# Patient Record
Sex: FEMALE | Race: WHITE | Employment: FULL TIME | URBAN - METROPOLITAN AREA
[De-identification: names, ages, dates, MRNs, and addresses within clinical notes are randomized per-mention and may not be internally consistent; named-entity substitution may affect disease eponyms.]

---

## 2017-01-10 ENCOUNTER — TELEPHONE (OUTPATIENT)
Dept: INTERNAL MEDICINE | Facility: CLINIC | Age: 37
End: 2017-01-10

## 2017-01-10 NOTE — TELEPHONE ENCOUNTER
1/10/2017    Call Regarding Preventive Health Screening Cervical/PAP    Attempt 1    Message on voicemail     Comments:       Outreach   KV

## 2017-01-20 NOTE — TELEPHONE ENCOUNTER
Call Regarding Preventive Health Screening Cervical/PAP and Physical    Attempt 2    Message on voicemail     Comments:       Outreach   Esthela Delgado

## 2017-01-25 NOTE — TELEPHONE ENCOUNTER
1/25/2017    Call Regarding Preventive Health Screening Cervical/PAP    Attempt 3    Message on voicemail     Comments:         Outreach   Keerthi oCe

## 2017-03-06 ENCOUNTER — TELEPHONE (OUTPATIENT)
Dept: OTHER | Facility: CLINIC | Age: 37
End: 2017-03-06

## 2017-03-06 NOTE — TELEPHONE ENCOUNTER
3/6/2017    Call Regarding Onboarding [employer]    Attempt 1    Message on voicemail     Comments: NO DEP      Outreach   CC

## 2017-03-13 NOTE — TELEPHONE ENCOUNTER
3/13/2017    Call Regarding Onboarding [employer]    Attempt 2    Message on voicemail     Comments: no dep        Outreach   Keerthi Coe

## 2017-03-21 NOTE — TELEPHONE ENCOUNTER
3/21/2017    Call Regarding Onboarding [employer]    Attempt 3    Message on voicemail     Comments: no dep        Outreach   Keerthi Coe

## 2017-06-24 ENCOUNTER — HEALTH MAINTENANCE LETTER (OUTPATIENT)
Age: 37
End: 2017-06-24

## 2017-12-29 ENCOUNTER — OFFICE VISIT (OUTPATIENT)
Dept: INTERNAL MEDICINE | Facility: CLINIC | Age: 37
End: 2017-12-29
Payer: COMMERCIAL

## 2017-12-29 VITALS
BODY MASS INDEX: 44.08 KG/M2 | SYSTOLIC BLOOD PRESSURE: 124 MMHG | WEIGHT: 264.6 LBS | TEMPERATURE: 97.6 F | HEIGHT: 65 IN | DIASTOLIC BLOOD PRESSURE: 98 MMHG | OXYGEN SATURATION: 95 % | HEART RATE: 99 BPM

## 2017-12-29 DIAGNOSIS — J30.89 CHRONIC NON-SEASONAL ALLERGIC RHINITIS, UNSPECIFIED TRIGGER: ICD-10-CM

## 2017-12-29 PROCEDURE — 99214 OFFICE O/P EST MOD 30 MIN: CPT | Performed by: PHYSICIAN ASSISTANT

## 2017-12-29 RX ORDER — FLUTICASONE PROPIONATE 50 MCG
1-2 SPRAY, SUSPENSION (ML) NASAL DAILY
Qty: 1 BOTTLE | Refills: 11 | Status: SHIPPED | OUTPATIENT
Start: 2017-12-29 | End: 2018-03-29

## 2017-12-29 RX ORDER — LORATADINE 10 MG/1
10 TABLET ORAL DAILY
Qty: 30 TABLET | Refills: 11 | Status: SHIPPED | OUTPATIENT
Start: 2017-12-29 | End: 2018-03-29

## 2017-12-29 RX ORDER — MONTELUKAST SODIUM 10 MG/1
10 TABLET ORAL AT BEDTIME
Qty: 30 TABLET | Refills: 11 | Status: SHIPPED | OUTPATIENT
Start: 2017-12-29 | End: 2018-03-29

## 2017-12-29 NOTE — NURSING NOTE
"Chief Complaint   Patient presents with     Cough     Headache     Nasal Congestion       Initial BP (!) 124/98  Pulse 99  Temp 97.6  F (36.4  C) (Oral)  Ht 5' 5\" (1.651 m)  Wt 264 lb 9.6 oz (120 kg)  LMP 12/12/2017  SpO2 95%  BMI 44.03 kg/m2 Estimated body mass index is 44.03 kg/(m^2) as calculated from the following:    Height as of this encounter: 5' 5\" (1.651 m).    Weight as of this encounter: 264 lb 9.6 oz (120 kg).  Medication Reconciliation: complete    "

## 2017-12-29 NOTE — PATIENT INSTRUCTIONS
Start back on Claritin and Singulair     Add in Flonase 2 sprays in each nostril     Follow up if no improvement

## 2017-12-29 NOTE — PROGRESS NOTES
"  SUBJECTIVE:   Steffi Hernandez is a 37 year old female who presents to clinic today for the following health issues:      RESPIRATORY SYMPTOMS      Duration: 1 month    Description  nasal congestion, sore throat, cough, headache and fatigue/malaise    Severity: severe    Accompanying signs and symptoms: None    History (predisposing factors):  none    Precipitating or alleviating factors: None    Therapies tried and outcome:  rest and fluids oral decongestant    Pt notes symptoms have been present for 1 month. She notes that got better than worsened yesterday.  She notes today she woke up feeling the best she has all month.  Pt reports congestions with 1 day of HA and sinus pain.  Her cough is productive.  She has had no fevers.  Got better than worsened yesterday   Pt has stopped her Singulair and Claritin  She has a history of chronic allergies.  She had nasal surgery last year without relief.     Problem list and histories reviewed & adjusted, as indicated.  Additional history: as documented    Reviewed and updated as needed this visit by clinical staffTobacco  Allergies  Meds  Problems  Med Hx  Surg Hx  Fam Hx  Soc Hx        Reviewed and updated as needed this visit by Provider  Meds  Problems           OBJECTIVE:     BP (!) 124/98  Pulse 99  Temp 97.6  F (36.4  C) (Oral)  Ht 5' 5\" (1.651 m)  Wt 264 lb 9.6 oz (120 kg)  LMP 12/12/2017  SpO2 95%  BMI 44.03 kg/m2  Body mass index is 44.03 kg/(m^2).  GENERAL: healthy, alert and no distress  EYES: Eyes grossly normal to inspection, PERRL and conjunctivae and sclerae normal  HENT: normal cephalic/atraumatic, ear canals and TM's normal, nasal mucosa edematous , oropharynx clear and oral mucous membranes moist  NECK: no adenopathy, no asymmetry, masses, or scars and thyroid normal to palpation  RESP: lungs clear to auscultation - no rales, rhonchi or wheezes  CV: regular rates and rhythm, normal S1 S2, no S3 or S4 and no murmur, click or " rub    ASSESSMENT/PLAN:     1. Chronic non-seasonal allergic rhinitis, unspecified trigger  - I suspect pt's symptoms are primarily allergic in nature with possible viral etiology as well, no signs or symptoms to suggest bacterial cause at this time  - restart allergy regimen   - loratadine (CLARITIN) 10 MG tablet; Take 1 tablet (10 mg) by mouth daily INDICATION: TO CONTROL ALLERGY SYMPTOMS  Dispense: 30 tablet; Refill: 11  - montelukast (SINGULAIR) 10 MG tablet; Take 1 tablet (10 mg) by mouth At Bedtime INDICATION: TO TREAT ALLERGIC RHINITIS  Dispense: 30 tablet; Refill: 11  - fluticasone (FLONASE) 50 MCG/ACT spray; Spray 1-2 sprays into both nostrils daily  Dispense: 1 Bottle; Refill: 11  - follow up if symptoms worsen or fail to improve, may need to consider referral back to allergist if no relief  - pt agreed to the above plan and all questions are answered     Angela Hope PA-C  [facility]

## 2017-12-29 NOTE — MR AVS SNAPSHOT
After Visit Summary   12/29/2017    Steffi Hernandez    MRN: [id number]           Patient Information     Date Of Birth          1980        Visit Information        Provider Department      12/29/2017 11:40 AM Angela Hope PA-C [facility]        Today's Diagnoses     Chronic non-seasonal allergic rhinitis, unspecified trigger          Care Instructions    Start back on Claritin and Singulair     Add in Flonase 2 sprays in each nostril     Follow up if no improvement           Follow-ups after your visit        Who to contact     If you have questions or need follow up information about today's clinic visit or your schedule please contact [facility] directly at [phone].  Normal or non-critical lab and imaging results will be communicated to you by NativeXhart, letter or phone within 4 business days after the clinic has received the results. If you do not hear from us within 7 days, please contact the clinic through NativeXhart or phone. If you have a critical or abnormal lab result, we will notify you by phone as soon as possible.  Submit refill requests through [email or web address] or call your pharmacy and they will forward the refill request to us. Please allow 3 business days for your refill to be completed.          Additional Information About Your Visit        MyChart Information     [email or web address] gives you secure access to your electronic health record. If you see a primary care provider, you can also send messages to your care team and make appointments. If you have questions, please call your primary care clinic.  If you do not have a primary care provider, please call [phone] and they will assist you.        Care EveryWhere ID     This is your Care EveryWhere ID. This could be used by other organizations to access your [place] medical records  [id number]        Your Vitals Were     Pulse Temperature Height Last Period Pulse Oximetry BMI (Body  "Mass Index)    99 97.6  F (36.4  C) (Oral) 5' 5\" (1.651 m) 12/12/2017 95% 44.03 kg/m2       Blood Pressure from Last 3 Encounters:   12/29/17 (!) 124/98   11/15/16 110/70   05/19/16 129/77    Weight from Last 3 Encounters:   12/29/17 264 lb 9.6 oz (120 kg)   11/15/16 273 lb 8 oz (124.1 kg)   05/18/16 264 lb (119.7 kg)              Today, you had the following     No orders found for display         Today's Medication Changes          These changes are accurate as of: 12/29/17 11:46 AM.  If you have any questions, ask your nurse or doctor.               Start taking these medicines.        Dose/Directions    fluticasone 50 MCG/ACT spray   Commonly known as:  FLONASE   Used for:  Chronic non-seasonal allergic rhinitis, unspecified trigger   Started by:  Angela Hope PA-C        Dose:  1-2 spray   Spray 1-2 sprays into both nostrils daily   Quantity:  1 Bottle   Refills:  11            Where to get your medicines      These medications were sent to [facility] [address] [place] [address] [address] AT [facility]  [address], [facility] [address]    Hours:  24-hours Phone:  [phone]     fluticasone 50 MCG/ACT spray    loratadine 10 MG tablet    montelukast 10 MG tablet                Primary Care Provider Office Phone # Fax #    Farhad Marr [phone]       XXX RESIGNED XXX  [facility] [address]        Equal Access to Services     RAGHU JONES AH: Hadavelino Magaña, wajerry ayala, qaybta kaalirene brewer. So [facility] [phone].    ATENCIÓN: Si habla español, tiene a sy disposición servicios gratuitos de asistencia lingüística. Tor al [phone].    We comply with applicable federal civil rights laws and [place] laws. We do not discriminate on the basis of race, color, national origin, age, disability, sex, sexual orientation, or gender identity.            Thank you!     Thank you for " choosing [facility]  for your care. Our goal is always to provide you with excellent care. Hearing back from our patients is one way we can continue to improve our services. Please take a few minutes to complete the written survey that you may receive in the mail after your visit with us. Thank you!             Your Updated Medication List - Protect others around you: Learn how to safely use, store and throw away your medicines at www.disposemymeds.org.          This list is accurate as of: 12/29/17 11:46 AM.  Always use your most recent med list.                   Brand Name Dispense Instructions for use Diagnosis    fluticasone 50 MCG/ACT spray    FLONASE    1 Bottle    Spray 1-2 sprays into both nostrils daily    Chronic non-seasonal allergic rhinitis, unspecified trigger       loratadine 10 MG tablet    CLARITIN    30 tablet    Take 1 tablet (10 mg) by mouth daily INDICATION: TO CONTROL ALLERGY SYMPTOMS    Chronic non-seasonal allergic rhinitis, unspecified trigger       montelukast 10 MG tablet    SINGULAIR    30 tablet    Take 1 tablet (10 mg) by mouth At Bedtime INDICATION: TO TREAT ALLERGIC RHINITIS    Chronic non-seasonal allergic rhinitis, unspecified trigger

## 2018-03-26 ASSESSMENT — ENCOUNTER SYMPTOMS
COUGH: 1
SPEECH CHANGE: 0
WHEEZING: 0
SMELL DISTURBANCE: 0
NUMBNESS: 1
MYALGIAS: 1
DIFFICULTY URINATING: 0
TINGLING: 1
SORE THROAT: 0
TASTE DISTURBANCE: 0
DIZZINESS: 0
MUSCLE WEAKNESS: 0
HEMATURIA: 0
SHORTNESS OF BREATH: 0
SEIZURES: 0
HEMOPTYSIS: 0
BRUISES/BLEEDS EASILY: 1
SWOLLEN GLANDS: 0
DISTURBANCES IN COORDINATION: 0
NECK MASS: 0
DYSURIA: 0
MUSCLE CRAMPS: 0
SINUS CONGESTION: 1
SNORES LOUDLY: 1
TROUBLE SWALLOWING: 0
POSTURAL DYSPNEA: 0
SPUTUM PRODUCTION: 1
PARALYSIS: 0
FLANK PAIN: 0
MEMORY LOSS: 0
COUGH DISTURBING SLEEP: 0
LOSS OF CONSCIOUSNESS: 0
JOINT SWELLING: 0
HEADACHES: 0
TREMORS: 0
HOARSE VOICE: 1
WEAKNESS: 0
STIFFNESS: 0
ARTHRALGIAS: 0
BACK PAIN: 1
NECK PAIN: 1
DYSPNEA ON EXERTION: 1
SINUS PAIN: 1

## 2018-03-29 ENCOUNTER — OFFICE VISIT (OUTPATIENT)
Dept: INTERNAL MEDICINE | Facility: CLINIC | Age: 38
End: 2018-03-29
Payer: COMMERCIAL

## 2018-03-29 VITALS
DIASTOLIC BLOOD PRESSURE: 92 MMHG | BODY MASS INDEX: 44.55 KG/M2 | RESPIRATION RATE: 16 BRPM | OXYGEN SATURATION: 96 % | WEIGHT: 267.7 LBS | SYSTOLIC BLOOD PRESSURE: 136 MMHG | HEART RATE: 86 BPM

## 2018-03-29 DIAGNOSIS — Z00.00 ENCOUNTER FOR ROUTINE ADULT HEALTH EXAMINATION WITHOUT ABNORMAL FINDINGS: ICD-10-CM

## 2018-03-29 DIAGNOSIS — J30.89 CHRONIC NON-SEASONAL ALLERGIC RHINITIS, UNSPECIFIED TRIGGER: ICD-10-CM

## 2018-03-29 DIAGNOSIS — R74.01 TRANSAMINITIS: Primary | ICD-10-CM

## 2018-03-29 RX ORDER — FLUTICASONE PROPIONATE 50 MCG
2 SPRAY, SUSPENSION (ML) NASAL DAILY
Qty: 1 BOTTLE | Refills: 3 | Status: SHIPPED | OUTPATIENT
Start: 2018-03-29 | End: 2019-07-09

## 2018-03-29 RX ORDER — MONTELUKAST SODIUM 10 MG/1
10 TABLET ORAL AT BEDTIME
Qty: 30 TABLET | Refills: 11 | Status: SHIPPED | OUTPATIENT
Start: 2018-03-29 | End: 2019-09-06

## 2018-03-29 RX ORDER — ECHINACEA PURPUREA EXTRACT 125 MG
2 TABLET ORAL 2 TIMES DAILY
Qty: 1 BOTTLE | Refills: 3 | Status: SHIPPED | OUTPATIENT
Start: 2018-03-29 | End: 2019-07-09

## 2018-03-29 ASSESSMENT — ENCOUNTER SYMPTOMS
DECREASED APPETITE: 0
NIGHT SWEATS: 0
TROUBLE SWALLOWING: 0
HEMOPTYSIS: 0
POSTURAL DYSPNEA: 0
POLYDIPSIA: 0
JAUNDICE: 0
DECREASED LIBIDO: 0
DISTURBANCES IN COORDINATION: 0
BREAST MASS: 0
DIARRHEA: 0
NECK MASS: 0
MYALGIAS: 1
MUSCLE CRAMPS: 0
WEAKNESS: 0
CONSTIPATION: 0
FATIGUE: 0
EXERCISE INTOLERANCE: 0
HOARSE VOICE: 1
DEPRESSION: 0
LEG PAIN: 0
HEARTBURN: 0
EYE REDNESS: 0
NERVOUS/ANXIOUS: 0
BOWEL INCONTINENCE: 0
PALPITATIONS: 0
BLOATING: 0
BACK PAIN: 1
DOUBLE VISION: 0
TINGLING: 1
DYSPNEA ON EXERTION: 1
DYSURIA: 0
CLAUDICATION: 0
WHEEZING: 0
SEIZURES: 0
JOINT SWELLING: 0
VOMITING: 0
NECK PAIN: 1
HEADACHES: 0
LEG SWELLING: 0
MUSCLE WEAKNESS: 0
FLANK PAIN: 0
HEMATURIA: 0
FEVER: 0
SORE THROAT: 0
INSOMNIA: 0
WEIGHT LOSS: 0
MEMORY LOSS: 0
POLYPHAGIA: 0
DIZZINESS: 0
BLOOD IN STOOL: 0
STIFFNESS: 0
EYE IRRITATION: 0
LOSS OF CONSCIOUSNESS: 0
HYPERTENSION: 0
SINUS PAIN: 1
SYNCOPE: 0
SMELL DISTURBANCE: 0
BREAST PAIN: 0
WEIGHT GAIN: 0
POOR WOUND HEALING: 0
NUMBNESS: 1
DIFFICULTY URINATING: 0
ABDOMINAL PAIN: 0
HYPOTENSION: 0
CHILLS: 0
EYE PAIN: 0
TASTE DISTURBANCE: 0
DECREASED CONCENTRATION: 0
PARALYSIS: 0
RECTAL PAIN: 0
COUGH: 1
SINUS CONGESTION: 1
PANIC: 0
SLEEP DISTURBANCES DUE TO BREATHING: 0
TACHYCARDIA: 0
RECTAL BLEEDING: 0
LIGHT-HEADEDNESS: 0
NAUSEA: 0
SHORTNESS OF BREATH: 0
SKIN CHANGES: 0
SWOLLEN GLANDS: 0
NAIL CHANGES: 0
INCREASED ENERGY: 0
SPUTUM PRODUCTION: 1
SNORES LOUDLY: 1
COUGH DISTURBING SLEEP: 0
HOT FLASHES: 0
TREMORS: 0
ORTHOPNEA: 0
HALLUCINATIONS: 0
EYE WATERING: 0
ARTHRALGIAS: 0
SPEECH CHANGE: 0
ALTERED TEMPERATURE REGULATION: 0
BRUISES/BLEEDS EASILY: 1

## 2018-03-29 ASSESSMENT — PAIN SCALES - GENERAL: PAINLEVEL: NO PAIN (0)

## 2018-03-29 NOTE — MR AVS SNAPSHOT
After Visit Summary   3/29/2018    Steffi Hernandez    MRN: [id number]           Patient Information     Date Of Birth          1980        Visit Information        Provider Department      3/29/2018 9:10 AM Gee Cadet MD [facility]        Today's Diagnoses     Transaminitis    -  1    Chronic non-seasonal allergic rhinitis, unspecified trigger        Class 3 obesity with body mass index (BMI) of 40.0 to 44.9 in adult, unspecified obesity type, unspecified whether serious comorbidity present (H)        Encounter for routine adult health examination without abnormal findings          Care Instructions    Primary Care Center Phone Number [phone]  Primary Care Center Medication Refill Request Information:  * Please contact your pharmacy regarding ANY request for medication refills.  ** [facility] Prescription Fax = [phone]  * Please allow 3 business days for routine medication refills.  * Please allow 5 business days for controlled substance medication refills.     Primary Care Center Test Result notification information:  *You will be notified with in 7-10 days of your appointment day regarding the results of your test.  If you are on MyChart you will be notified as soon as the provider has reviewed the results and signed off on them.      - Please resume using flonase (point head down with tip of the bottle towards the ceiling, sniff with spray, wait a few minutes then repeat), singulair, continue daily zrytec/allegra.  - When eating out, try to make healthy choices from the menu, increase vegetable intake  - Please return for PAP  -  a blood pressure cuff and record your blood pressure    Dietary Approaches to Stop Hypertension (The DASH Diet)   What is hypertension?   Hypertension is the term for blood pressure that is consistently higher than normal. Blood pressure is the force of blood against artery walls. Blood pressure can be unhealthy if it is above 120/80.  The higher your blood pressure, the greater the health risk.     High blood pressure can be controlled if you take these steps:   Maintain a healthy weight.   Be physically active.   Follow a healthy eating plan, which includes foods lower in salt and sodium.   If you drink alcoholic beverages, do so in moderation.   As noted in this list, diet affects high blood pressure. Following the DASH diet and reducing the amount of sodium in your diet will help lower your blood pressure. It will also help prevent high blood pressure.     What is the DASH diet?   Dietary Approaches to Stop Hypertension (DASH) is a diet that is low in saturated fat, cholesterol, and total fat. It emphasizes fruits, vegetables, and low-fat dairy foods. The DASH diet also includes whole-grain products, fish, poultry, and nuts. It encourages fewer servings of red meat, sweets, and sugar-containing beverages. It is rich in magnesium, potassium, and calcium, as well as protein and fiber.     How do I get started on the DASH diet?   The DASH diet requires no special foods and has no hard-to-follow recipes. Start by seeing how DASH compares with your current eating habits.  The DASH eating plan shown is based on 2,000 calories a day. Your health care provider or a dietitian can help you determine how many calories a day you need. Most adults need somewhere between 1600 and 2800 calories a day. Serving sizes will vary between 1/2 cup and 1 1/4 cups.     Check the product's nutrition label to determine serving sizes of particular products.    Food Group   Number of Servings   Examples of Serving Size    Grains and grain products   7 to 8   1 slice of bread    1 cup ready-to-eat cold cereal    1/2 cup cooked rice, pasta, or   cereal    Vegetables   4 to 5   1 cup raw leafy vegetable    1/2 cup cooked vegetable    6 oz. vegetable juice    Fruits   4 to 5   1 medium fruit       1/4 cup dried fruit    1/2 cup fresh, frozen, or canned fruit    6 oz fruit  juice    Low-fat or fat-free dairy foods   2 to 3   8 oz milk    1 cup yogurt    1 1/2 oz cheese    Lean meats, poultry, or fish   2 or fewer   3 oz cooked lean meat, skinless poultry, or fish    Nuts, seeds, and dry beans   4 to 5 per week   1/3 cup or 1 1/2 oz nuts    1 tablespoon or 1/2 oz seeds    1/2 cup cooked dry beans     Fats and oils   2 to 3   1 teaspoon soft margarine    1 tablespoon low-fat mayonnaise    2 tablespoons light salad dressing    1 teaspoon vegetable oil   Sweets   5 per week   1 tablespoon sugar              8 oz lemonade    1 tablespoon jelly or jam     1/2 oz jelly beans     Make changes gradually. Here are some suggestions that might help:     If you now eat 1 or 2 servings of vegetables a day, add a serving at lunch and another at dinner.   If you don't eat fruit now or have only juice at breakfast, add a serving to your meals or have it as a snack.   Drink milk or water with lunch or dinner instead of soda, sugar-sweetened tea, or alcohol. Choose low-fat (1%) or fat-free (skim) dairy products to reduce how much saturated fat, total fat, cholesterol, and calories you eat. If you have trouble digesting dairy products, try taking lactase enzyme pills or drops (available at drugstores and groceries) with the dairy foods. Or buy lactose-free milk or milk with lactase enzyme added to it.   Read food labels on margarines and salad dressings to choose products lowest in fat.   If you now eat large portions of meat, cut back gradually--by a half or a third at each meal. Limit meat to 6 ounces a day (2 servings). Three to four ounces is about the size of a deck of cards.   Have 2 or more vegetarian-style (meatless) meals each week. Increase servings of vegetables, rice, pasta, and beans in all meals. Try casseroles and pasta, and stir-white dishes, which have less meat and more vegetables, grains, and beans.   Use fruits canned in their own juice. Fresh fruits require little or no preparation.  Dried fruits are a good choice to carry with you or to have ready in the car.   Try these snacks ideas: unsalted pretzels or nuts mixed with raisins, caleb crackers, low-fat and fat-free yogurt and frozen yogurt, popcorn with no salt or butter added, and raw vegetables.   Choose whole grain foods to get more nutrients, including minerals and fiber. For example, choose whole-wheat bread or whole-grain cereals.   Use fresh, frozen, or no-salt-added canned vegetables.     Remember to also reduce the salt and sodium in your diet. Try to have no more than 2000 milligrams (mg) of sodium per day, with a goal of further reducing the sodium to 1500 mg per day. Three important ways to reduce sodium are:     Use reduced-sodium or no-salt-added food products.   Use less salt when you prepare foods and do not add salt to your food at the table.   Read fool labels. Aim for foods that are less than 5 percent of the daily value of sodium.     The DASH eating plan was not designed for weight loss. But it contains many lower calorie foods, such as fruits and vegetables. You can make it lower in calories by replacing higher calorie foods with more fruits and vegetables. Some ideas to increase fruits and vegetables and decrease calories include:     Eat a medium apple instead of four shortbread cookies. You'll save 80 calories.   Eat 1/4 cup of dried apricots instead of a 2-ounce bag of pork rinds. You'll save 230 calories.   Have a hamburger that's 3 ounces instead of 6 ounces. Add a 1/2 cup serving of carrots and a 1/2 cup serving of spinach. You'll save more than 200 calories.   Instead of 5 ounces of chicken, have a stir white with 2 ounces of chicken and 1 and 1/2 cups of raw vegetables. Use a small amount of vegetable oil. You'll save 50 calories.   Have a 1/2 cup serving of low-fat frozen yogurt instead of a 1 and 1/2 ounce milk chocolate bar. You'll save about 110 calories.   Use low-fat or fat-free condiments, such as fat free  salad dressings.   Eat smaller portions--cut back gradually.   Use food labels to compare fat content in packaged foods. Items marked low-fat or fat-free may be lower in fat without being lower in calories than their regular versions.   Limit foods with lots of added sugar, such as pies, flavored yogurts, candy bars, ice cream, sherbet, regular soft drinks, and fruit drinks.   Drink water or club soda instead of cola or other soda drinks.     Based on National Institutes of Health Guidelines. Published by [employer].   Copyright   2004 [employer] and/or one of its subsidiaries. All Rights Reserved.             Follow-ups after your visit        Follow-up notes from your care team     Return in about 6 weeks (around 5/10/2018).      Your next 10 appointments already scheduled     May 10, 2018  8:20 AM CDT   (Arrive by 8:05 AM)   Return Visit with Gee Cadet MD   [facility] ([facility])    [address] [place] [address] [id number] [phone]              Future tests that were ordered for you today     Open Future Orders        Priority Expected Expires Ordered    Lipid panel reflex to direct LDL Fasting Routine 3/29/2018 4/12/2018 3/29/2018    Hemoglobin A1c Routine 3/29/2018 4/12/2018 3/29/2018    Hepatic panel Routine 3/29/2018 4/12/2018 3/29/2018            Who to contact     Please call your clinic at [phone] to:    Ask questions about your health    Make or cancel appointments    Discuss your medicines    Learn about your test results    Speak to your doctor            Additional Information About Your Visit        [employer] Information     [employer] gives you secure access to your electronic health record. If you see a primary care provider, you can also send messages to your care team and make appointments. If you have questions, please call your primary care clinic.  If you do not have a primary care provider,  please call [phone] and they will assist you.      [employer] is an electronic gateway that provides easy, online access to your medical records. With [employer], you can request a clinic appointment, read your test results, renew a prescription or communicate with your care team.     To access your existing account, please contact your [facility] or call [phone] for assistance.        Care EveryWhere ID     This is your Care EveryWhere ID. This could be used by other organizations to access your [facility] medical records  [id number]        Your Vitals Were     Pulse Respirations Pulse Oximetry Breastfeeding? BMI (Body Mass Index)       86 16 96% No 44.55 kg/m2        Blood Pressure from Last 3 Encounters:   03/29/18 (!) 136/92   12/29/17 (!) 124/98   11/15/16 110/70    Weight from Last 3 Encounters:   03/29/18 121.4 kg (267 lb 11.2 oz)   12/29/17 120 kg (264 lb 9.6 oz)   11/15/16 124.1 kg (273 lb 8 oz)              We Performed the Following     TDAP VACCINE (BOOSTRIX)          Today's Medication Changes          These changes are accurate as of 3/29/18 10:17 AM.  If you have any questions, ask your nurse or doctor.               Start taking these medicines.        Dose/Directions    sodium chloride 0.65 % nasal spray   Commonly known as:  OCEAN   Used for:  Chronic non-seasonal allergic rhinitis, unspecified trigger   Started by:  Gee Cadet MD        Dose:  2 spray   Spray 2 sprays into both nostrils 2 times daily   Quantity:  1 Bottle   Refills:  3         These medicines have changed or have updated prescriptions.        Dose/Directions    fluticasone 50 MCG/ACT spray   Commonly known as:  FLONASE   This may have changed:  how much to take   Used for:  Chronic non-seasonal allergic rhinitis, unspecified trigger   Changed by:  Gee Cadet MD        Dose:  2 spray   Spray 2 sprays into both nostrils daily   Quantity:  1 Bottle   Refills:  3         Stop taking these  medicines if you haven't already. Please contact your care team if you have questions.     loratadine 10 MG tablet   Commonly known as:  CLARITIN   Stopped by:  Gee Cadet MD                Where to get your medicines      These medications were sent to [id number] - [place], [place] - [address] [facility] [address], [facility] [id number]    Hours:  24-hours Phone:  [phone]     fluticasone 50 MCG/ACT spray    montelukast 10 MG tablet    sodium chloride 0.65 % nasal spray                Primary Care Provider Office Phone # Fax #    Gee Cadet [phone]       [address] [address]        Equal Access to Services     RAGHU JONES AH: Hadii aad ku hadasho Soomaali, waaxda luqadaha, qaybta kaalmada adeegyada, waxay salimain haymistyn fatmata sorenson. So [facility] [phone].    ATENCIÓN: Si habla español, tiene a sy disposición servicios gratuitos de asistencia lingüística. [facility] [phone].    We comply with applicable federal civil rights laws and [place] laws. We do not discriminate on the basis of race, color, national origin, age, disability, sex, sexual orientation, or gender identity.            Thank you!     Thank you for choosing [facility]  for your care. Our goal is always to provide you with excellent care. Hearing back from our patients is one way we can continue to improve our services. Please take a few minutes to complete the written survey that you may receive in the mail after your visit with us. Thank you!             Your Updated Medication List - Protect others around you: Learn how to safely use, store and throw away your medicines at www.disposemymeds.org.          This list is accurate as of 3/29/18 10:17 AM.  Always use your most recent med list.                   Brand Name Dispense Instructions for use Diagnosis    fluticasone 50 MCG/ACT spray    FLONASE     1 Bottle    Spray 2 sprays into both nostrils daily    Chronic non-seasonal allergic rhinitis, unspecified trigger       montelukast 10 MG tablet    SINGULAIR    30 tablet    Take 1 tablet (10 mg) by mouth At Bedtime INDICATION: TO TREAT ALLERGIC RHINITIS    Chronic non-seasonal allergic rhinitis, unspecified trigger       sodium chloride 0.65 % nasal spray    OCEAN    1 Bottle    Spray 2 sprays into both nostrils 2 times daily    Chronic non-seasonal allergic rhinitis, unspecified trigger

## 2018-03-29 NOTE — PROGRESS NOTES
"  PRIMARY CARE CENTER         HPI:       Steffi Hernandez is a 37 year old female who presents for the following  Patient presents with: Establish Care (pt is here to establish care with new PCP) and Cough (pt is here to discuss cough/congestion since November )    Patient has noted daily cough since 11/2017.  She states that symptoms are worse in the morning, wakes up every morning, sometimes \"feels that she may throw up\" though her \"stomach is not upset\".  She has noted clear nasal drainage, occasional watery eyes.  She feels that she can breathe through her nose.  She has a history of seasonal allergies including allergies to animal dander, allergies worst in spring and fall.  She thinks that her symptoms have been stable since onset.  She was evaluated 12/2017.  At that time started Flonase, Singulair, Claritin.  She did not feel that this was effective after taking for 1 month.  She really has reflux symptoms, does not clear her throat.  She has noted some voice hoarseness recently.    Wakes up every AM with cough, feels that she may throw up. ENT surgery not effective. Feeling nose dripping. Clear mucous. Feels that she can breath through nose. Hx allergies animal dander, rabbits, seasonal allergies spring and fall mainly. Symptoms stable. Seen 12/2017, started flonase, singulair, claritin. Took for 1 month, no effect. Rarely has reflux, does not clear throat. Voice hoarseness. Notes eye watering, no redness or itchiness.    Numbness and tingling throughout R hand mainly when driving, rare occurrence (1x/month). Shakes out hands and symptoms resolve. Previously on B6, didn't notice a difference.    Breakfast sandwich from [employer], lunch - sandwich brings food from home 1-2 per week otherwise [facility], dinner - 4-5 days per week eat out fast casual restaurants. Diet soda. Doesn't have dessert often. Fruits and veg 1 meal per day. Non existent exercise.     Problem, Medication and Allergy " Lists were reviewed and are current.  Patient is a new patient to this clinic and so  I reviewed/updated the Past Medical History, the Family History and the Social History.          Review of Systems:   Review of Systems     Constitutional:  Negative for fever, chills, weight loss, weight gain, fatigue, decreased appetite, night sweats, recent stressors, height gain, height loss, post-operative complications, incisional pain, hallucinations, increased energy, hyperactivity and confused.   HENT:  Positive for hoarse voice, sinus pain and sinus congestion. Negative for ear pain, hearing loss, tinnitus, nosebleeds, trouble swallowing, mouth sores, sore throat, ear discharge, tooth pain, gum tenderness, taste disturbance, smell disturbance, hearing aid, bleeding gums, dry mouth and neck mass.    Eyes:  Negative for double vision, pain, redness, eye pain, decreased vision, eye watering, eye bulging, eye dryness, flashing lights, spots, floaters, strabismus, tunnel vision, jaundice and eye irritation.   Respiratory:   Positive for cough, sputum production, snores loudly and dyspnea on exertion. Negative for hemoptysis, shortness of breath, wheezing, sleep disturbances due to breathing, cough disturbing sleep and postural dyspnea.    Cardiovascular:  Positive for dyspnea on exertion. Negative for chest pain, palpitations, orthopnea, claudication, leg swelling, fingers/toes turn blue, hypertension, hypotension, syncope, history of heart murmur, chest pain on exertion, chest pain at rest, pacemaker, few scattered varicosities, leg pain, sleep disturbances due to breathing, tachycardia, light-headedness, exercise intolerance and edema.   Gastrointestinal:  Negative for heartburn, nausea, vomiting, abdominal pain, diarrhea, constipation, blood in stool, melena, rectal pain, bloating, hemorrhoids, bowel incontinence, jaundice, rectal bleeding, coffee ground emesis and change in stool.   Genitourinary:  Positive for bladder  incontinence. Negative for dysuria, urgency, hematuria, flank pain, vaginal discharge, difficulty urinating, genital sores, dyspareunia, decreased libido, nocturia, voiding less frequently, arousal difficulty, abnormal vaginal bleeding, excessive menstruation, menstrual changes, hot flashes, vaginal dryness and postmenopausal bleeding.   Musculoskeletal:  Positive for myalgias, back pain and neck pain. Negative for joint swelling, arthralgias, stiffness, muscle cramps, bone pain, muscle weakness and fracture.   Skin:  Negative for nail changes, itching, poor wound healing, rash, hair changes, skin changes, acne, warts, poor wound healing, scarring, flaky skin, Raynaud's phenomenon, sensitivity to sunlight and skin thickening.   Neurological:  Positive for tingling and numbness. Negative for dizziness, tremors, speech change, seizures, loss of consciousness, weakness, light-headedness, headaches, disturbances in coordination, memory loss, difficulty walking and paralysis.   Endo/Heme:  Positive for bruises/bleeds easily. Negative for anemia and swollen glands.   Psychiatric/Behavioral:  Negative for depression, hallucinations, memory loss, decreased concentration, mood swings and panic attacks.    Breast:  Negative for breast discharge, breast mass, breast pain and nipple retraction.   Endocrine:  Negative for altered temperature regulation, polyphagia, polydipsia, unwanted hair growth and change in facial hair.    I have personally reviewed and updated the complete ROS on the day of the visit.           Physical Exam:   BP (!) 136/92  Pulse 86  Resp 16  Wt 121.4 kg (267 lb 11.2 oz)  SpO2 96%  Breastfeeding? No  BMI 44.55 kg/m2  Body mass index is 44.55 kg/(m^2).  Vitals were reviewed      GENERAL APPEARANCE: healthy, alert and no distress     EYES: EOMI, PERRL     HENT: ear canals and TM's normal and nose and mouth without ulcers or lesions     NECK: no adenopathy, no asymmetry, masses, or scars and thyroid  normal to palpation     RESP: lungs clear to auscultation - no rales, rhonchi or wheezes     CV: regular rates and rhythm, normal S1 S2, no S3 or S4 and no murmur, click or rub     ABDOMEN:  Obese, soft, nontender, no HSM or masses and bowel sounds normal     MS: extremities normal- no gross deformities noted, no evidence of inflammation in joints, no edema, wwp     SKIN: no suspicious lesions or rashes     NEURO: Normal strength and tone, sensory exam grossly normal, mentation intact and speech normal     PSYCH: mentation appears normal. and affect normal/bright        Results:     Results reviewed in EPIC  Assessment and Plan     Steffi was seen today for establish care and cough.    Diagnoses and all orders for this visit:    Transaminitis  Patient previously noted to have ,  in 5/2016.  Patient does not drink alcohol, does have obesity, likely NAFLD. Will repeat LFTs today, pursue further workup if continues to be elevated.  -     Hepatic panel; Future    Chronic non-seasonal allergic rhinitis, unspecified trigger  Patient symptoms appear allergic, given watery eyes and clear mucus drainage as well as long history of seasonal allergies.  Patient previously on fluticasone, loratadine, Singulair without improvement.  Patient instructed on proper use of fluticasone today.  We will switch antihistamine to either cetirizine or Allegra which patient is already taking.  Start nasal saline.  -     fluticasone (FLONASE) 50 MCG/ACT spray; Spray 2 sprays into both nostrils daily  -     montelukast (SINGULAIR) 10 MG tablet; Take 1 tablet (10 mg) by mouth At Bedtime INDICATION: TO TREAT ALLERGIC RHINITIS  -     sodium chloride (OCEAN) 0.65 % nasal spray; Spray 2 sprays into both nostrils 2 times daily    Class 3 obesity with body mass index (BMI) of 40.0 to 44.9 in adult, unspecified obesity type, unspecified whether serious comorbidity present (H)  A long discussion with patient on lifestyle modifications  including diet and exercise.  Patient eats out quite frequently during the week, discussed cutting back and/or making more healthy choices when she does eat out.  Also recommended beginning exercise.  -     Lipid panel reflex to direct LDL Fasting; Future  -     Hemoglobin A1c; Future    Encounter for routine adult health examination without abnormal findings  -     TDAP VACCINE (BOOSTRIX)  - Patient due for Pap, will plan at follow up      Options for treatment and follow-up care were reviewed with the patient. Steffi Hernandez engaged in the decision making process and verbalized understanding of the options discussed and agreed with the final plan.    Gee Cadet MD  Mar 29, 2018    Pt was seen and examined with Dr. Cadet;  I agree with the A/P documentation above    Alessandra Durant MD

## 2018-03-29 NOTE — PATIENT INSTRUCTIONS
[facility] Phone Number [phone]  [facility] Medication Refill Request Information:  * Please contact your pharmacy regarding ANY request for medication refills.  ** [facility] Prescription Fax = [phone]  * Please allow 3 business days for routine medication refills.  * Please allow 5 business days for controlled substance medication refills.     [facility] Test Result notification information:  *You will be notified with in 7-10 days of your appointment day regarding the results of your test.  If you are on MyChart you will be notified as soon as the provider has reviewed the results and signed off on them.      - Please resume using flonase (point head down with tip of the bottle towards the ceiling, sniff with spray, wait a few minutes then repeat), singulair, continue daily zrytec/allegra.  - When eating out, try to make healthy choices from the menu, increase vegetable intake  - Please return for PAP  -  a blood pressure cuff and record your blood pressure    Dietary Approaches to Stop Hypertension (The DASH Diet)   What is hypertension?   Hypertension is the term for blood pressure that is consistently higher than normal. Blood pressure is the force of blood against artery walls. Blood pressure can be unhealthy if it is above 120/80. The higher your blood pressure, the greater the health risk.     High blood pressure can be controlled if you take these steps:   Maintain a healthy weight.   Be physically active.   Follow a healthy eating plan, which includes foods lower in salt and sodium.   If you drink alcoholic beverages, do so in moderation.   As noted in this list, diet affects high blood pressure. Following the DASH diet and reducing the amount of sodium in your diet will help lower your blood pressure. It will also help prevent high blood pressure.     What is the DASH diet?   Dietary Approaches to Stop Hypertension (DASH) is a diet that is low in saturated fat,  cholesterol, and total fat. It emphasizes fruits, vegetables, and low-fat dairy foods. The DASH diet also includes whole-grain products, fish, poultry, and nuts. It encourages fewer servings of red meat, sweets, and sugar-containing beverages. It is rich in magnesium, potassium, and calcium, as well as protein and fiber.     How do I get started on the DASH diet?   The DASH diet requires no special foods and has no hard-to-follow recipes. Start by seeing how DASH compares with your current eating habits.  The DASH eating plan shown is based on 2,000 calories a day. Your health care provider or a dietitian can help you determine how many calories a day you need. Most adults need somewhere between 1600 and 2800 calories a day. Serving sizes will vary between 1/2 cup and 1 1/4 cups.     Check the product's nutrition label to determine serving sizes of particular products.    Food Group   Number of Servings   Examples of Serving Size    Grains and grain products   7 to 8   1 slice of bread    1 cup ready-to-eat cold cereal    1/2 cup cooked rice, pasta, or   cereal    Vegetables   4 to 5   1 cup raw leafy vegetable    1/2 cup cooked vegetable    6 oz. vegetable juice    Fruits   4 to 5   1 medium fruit       1/4 cup dried fruit    1/2 cup fresh, frozen, or canned fruit    6 oz fruit juice    Low-fat or fat-free dairy foods   2 to 3   8 oz milk    1 cup yogurt    1 1/2 oz cheese    Lean meats, poultry, or fish   2 or fewer   3 oz cooked lean meat, skinless poultry, or fish    Nuts, seeds, and dry beans   4 to 5 per week   1/3 cup or 1 1/2 oz nuts    1 tablespoon or 1/2 oz seeds    1/2 cup cooked dry beans     Fats and oils   2 to 3   1 teaspoon soft margarine    1 tablespoon low-fat mayonnaise    2 tablespoons light salad dressing    1 teaspoon vegetable oil   Sweets   5 per week   1 tablespoon sugar              8 oz lemonade    1 tablespoon jelly or jam     1/2 oz jelly beans     Make changes gradually. Here are some  suggestions that might help:     If you now eat 1 or 2 servings of vegetables a day, add a serving at lunch and another at dinner.   If you don't eat fruit now or have only juice at breakfast, add a serving to your meals or have it as a snack.   Drink milk or water with lunch or dinner instead of soda, sugar-sweetened tea, or alcohol. Choose low-fat (1%) or fat-free (skim) dairy products to reduce how much saturated fat, total fat, cholesterol, and calories you eat. If you have trouble digesting dairy products, try taking lactase enzyme pills or drops (available at drugstores and groceries) with the dairy foods. Or buy lactose-free milk or milk with lactase enzyme added to it.   Read food labels on margarines and salad dressings to choose products lowest in fat.   If you now eat large portions of meat, cut back gradually--by a half or a third at each meal. Limit meat to 6 ounces a day (2 servings). Three to four ounces is about the size of a deck of cards.   Have 2 or more vegetarian-style (meatless) meals each week. Increase servings of vegetables, rice, pasta, and beans in all meals. Try casseroles and pasta, and stir-white dishes, which have less meat and more vegetables, grains, and beans.   Use fruits canned in their own juice. Fresh fruits require little or no preparation. Dried fruits are a good choice to carry with you or to have ready in the car.   Try these snacks ideas: unsalted pretzels or nuts mixed with raisins, caleb crackers, low-fat and fat-free yogurt and frozen yogurt, popcorn with no salt or butter added, and raw vegetables.   Choose whole grain foods to get more nutrients, including minerals and fiber. For example, choose whole-wheat bread or whole-grain cereals.   Use fresh, frozen, or no-salt-added canned vegetables.     Remember to also reduce the salt and sodium in your diet. Try to have no more than 2000 milligrams (mg) of sodium per day, with a goal of further reducing the sodium to 1500 mg  per day. Three important ways to reduce sodium are:     Use reduced-sodium or no-salt-added food products.   Use less salt when you prepare foods and do not add salt to your food at the table.   Read fool labels. Aim for foods that are less than 5 percent of the daily value of sodium.     The DASH eating plan was not designed for weight loss. But it contains many lower calorie foods, such as fruits and vegetables. You can make it lower in calories by replacing higher calorie foods with more fruits and vegetables. Some ideas to increase fruits and vegetables and decrease calories include:     Eat a medium apple instead of four shortbread cookies. You'll save 80 calories.   Eat 1/4 cup of dried apricots instead of a 2-ounce bag of pork rinds. You'll save 230 calories.   Have a hamburger that's 3 ounces instead of 6 ounces. Add a 1/2 cup serving of carrots and a 1/2 cup serving of spinach. You'll save more than 200 calories.   Instead of 5 ounces of chicken, have a stir white with 2 ounces of chicken and 1 and 1/2 cups of raw vegetables. Use a small amount of vegetable oil. You'll save 50 calories.   Have a 1/2 cup serving of low-fat frozen yogurt instead of a 1 and 1/2 ounce milk chocolate bar. You'll save about 110 calories.   Use low-fat or fat-free condiments, such as fat free salad dressings.   Eat smaller portions--cut back gradually.   Use food labels to compare fat content in packaged foods. Items marked low-fat or fat-free may be lower in fat without being lower in calories than their regular versions.   Limit foods with lots of added sugar, such as pies, flavored yogurts, candy bars, ice cream, sherbet, regular soft drinks, and fruit drinks.   Drink water or club soda instead of cola or other soda drinks.     Based on National Institutes of Health Guidelines. Published by [employer].   Copyright   2004 [employer] and/or one of its subsidiaries. All Rights Reserved.

## 2018-03-29 NOTE — NURSING NOTE
Chief Complaint   Patient presents with     Establish Care     pt is here to establish care with new PCP     Cough     pt is here to discuss cough/congestion since November        Noa Aldana CMA at 9:14 AM on 3/29/2018

## 2018-05-25 ENCOUNTER — TELEPHONE (OUTPATIENT)
Dept: INTERNAL MEDICINE | Facility: CLINIC | Age: 38
End: 2018-05-25

## 2018-10-09 ENCOUNTER — APPOINTMENT (OUTPATIENT)
Dept: LAB | Facility: CLINIC | Age: 38
End: 2018-10-09
Payer: COMMERCIAL

## 2018-10-09 ENCOUNTER — OFFICE VISIT (OUTPATIENT)
Dept: DERMATOLOGY | Facility: CLINIC | Age: 38
End: 2018-10-09
Payer: COMMERCIAL

## 2018-10-09 DIAGNOSIS — Z79.899 ON ISOTRETINOIN THERAPY: ICD-10-CM

## 2018-10-09 DIAGNOSIS — L70.0 ACNE VULGARIS: Primary | ICD-10-CM

## 2018-10-09 DIAGNOSIS — L73.9 FOLLICULITIS: ICD-10-CM

## 2018-10-09 LAB — HCG UR QL: NEGATIVE

## 2018-10-09 RX ORDER — CEPHALEXIN 500 MG/1
500 CAPSULE ORAL 3 TIMES DAILY
Qty: 90 CAPSULE | Refills: 0 | Status: SHIPPED | OUTPATIENT
Start: 2018-10-09 | End: 2018-10-18

## 2018-10-09 ASSESSMENT — PAIN SCALES - GENERAL: PAINLEVEL: NO PAIN (0)

## 2018-10-09 NOTE — NURSING NOTE
Dermatology Rooming Note    Steffi Hernandez's goals for this visit include:   Chief Complaint   Patient presents with     Acne     Steffi is here today to be seen for acne.      CHRISTOPHER Lassiter

## 2018-10-09 NOTE — LETTER
10/9/2018       RE: Steffi Hernandez  [address] [address] [address]     Dear Colleague,    Thank you for referring your patient, Steffi Hernandez, to the [facility] DERMATOLOGY at [facility]. Please see a copy of my visit note below.    [facility] Dermatology Note      Dermatology Problem List:  1. Folliculitis maybe early stage of Hidradenitis Suppurativa -Kefelx 500mg po TID  -Bacterial culture on the central abdomen, 10/09/2018  -patient start isotretinoin next month  2. Acne vulgaris -Discussed starting Isotretinoin next month after discussing starting OCPs with OBGYN  -Current tx: Keflex 500mg po TID  -Previous tx: various topical medications, numerous oral antibiotics, spironolactone, OCPs      Encounter Date: Oct 9, 2018    CC:  Chief Complaint   Patient presents with     Acne     Steffi is here today to be seen for acne.          History of Present Illness:  Ms. Steffi Hernandez is a 38 year old female who is new to the dermatology clinic.The patient is here for an evaluation for acne.  At today's visit the patient reports that she has been breaking out underneath her arm pits, stomach, face, and very minimal on the chest/back. She has had facial acne since adolescence and she thought she would grow out of it, but never has. The acne in her axilla and her lower abdomen is new within the past several months.The patient reports that she has been on many different medications for acne - topicals and orals, and she cant really list all of them. She thinks oral abx and spirolactone and various prescription topicals. Uses bare minerals for her make up and cetaphil for moisturizer and cleanser. Reports that her acne is correlated to her menstrual cycle which she gets regularily. No hx UC or Chron's. She does have hx of depression/anxiety, but this is stable and she is not currently seeking treatment or therapy for it at this time. The  patient denies painful, itching, tingling or bleeding lesions unless otherwise noted.      Past Medical History:   Patient Active Problem List   Diagnosis     Fatigue     Heavy menses     Memory difficulty     Allergic rhinosinusitis     CARDIOVASCULAR SCREENING; LDL GOAL LESS THAN 160     Acne     Acute maxillary sinusitis     Vitamin B12 deficiency without anemia     Vitamin D deficiency     Ascorbic acid deficiency     Iron deficiency     Moderate major depression (H)     Hirsutism     Pyridoxine deficiency     Dysmenorrhea     Family history of diabetes mellitus     Major depressive disorder, recurrent episode, moderate (HCC)     Morbid obesity, unspecified obesity type (H)     Chronic fatigue     Menorrhagia with regular cycle     Allergic rhinitis, unspecified allergic rhinitis type     LANDRY (obstructive sleep apnea)     Morbid obesity due to excess calories (H)     Sleep apnea, obstructive     Deviated nasal septum     Past Medical History:   Diagnosis Date     Allergic rhinitis, cause unspecified      Other acne      Sleep apnea     No treatment at this time.     Unspecified urinary incontinence      Past Surgical History:   Procedure Laterality Date     C NONSPECIFIC PROCEDURE      Urology surgery for night time bed wetting at age 6.     SEPTOPLASTY, TURBINOPLASTY, COMBINED N/A 2016    Procedure: COMBINED SEPTOPLASTY, TURBINOPLASTY;  Surgeon: Baldev Perez MD;  Location:  OR       Social History:   reports that she has never smoked. She has never used smokeless tobacco. She reports that she drinks alcohol. She reports that she does not use illicit drugs. Patient is sexually active with boyfriend - who has a vasectomy    Family History:  Family History   Problem Relation Age of Onset     Cerebrovascular Disease Father      x2     Hypertension Father      Seizure Disorder Father      Diabetes Paternal Grandfather      Cancer Paternal Grandfather        from throat cancer. Also had  melanoma.     Blood Disease Paternal Grandfather      B12 DEFICIENCY - WAS ON B12 SHOTS     Cancer Maternal Grandmother       of colon cancer     Cerebrovascular Disease Maternal Grandfather      HEART DISEASE Maternal Grandfather            Neurologic Disorder Sister      Epilepsy diagnosed      Neurologic Disorder Sister      Epilepsy diagnosed        Medications:  Current Outpatient Prescriptions   Medication Sig Dispense Refill     fluticasone (FLONASE) 50 MCG/ACT spray Spray 2 sprays into both nostrils daily 1 Bottle 3     montelukast (SINGULAIR) 10 MG tablet Take 1 tablet (10 mg) by mouth At Bedtime INDICATION: TO TREAT ALLERGIC RHINITIS 30 tablet 11     sodium chloride (OCEAN) 0.65 % nasal spray Spray 2 sprays into both nostrils 2 times daily 1 Bottle 3     [DISCONTINUED] Ferrous Sulfate (SPATONE PUR-ABSORB IRON) 5 MG/20ML LIQD Take 1 packet by mouth 2 times daily (before meals). INDICATION: TO CORRECT IRON DEFICIENCY - PLEASE TAKE WITH EMERGEN-C MIXED IN 8  8 OZ OF WATER 1 Box PRN       Allergies   Allergen Reactions     Animal Dander      Cats      Dogs      No Known Drug Allergies      Rabbit Protein      Seasonal Allergies        Review of Systems:  -Constitutional: The patient denies fatigue, fevers, chills, unintended weight loss, and night sweats.  -Skin: As above in HPI. No additional skin concerns.  -Neuro: no HA or vision changes  -GI: No nausea, blood in stool, diarrhea, hx of IBD  -Psych: no depression/anxiety, mood changes, or sleep problems   -Musculoskeletal: no joint or muscle pain or swelling   -Heme/Lymph: no concerning bumps, no bleeding problems    Physical exam:  Vitals: weight 122.47 kg, 270lbs  GEN: This is a well developed, well-nourished female in no acute distress, in a pleasant mood.    SKIN: Waist-up skin, which includes the head/face, neck, both arms, chest, back, abdomen, digits and/or nails was examined.  -bilateral axilla with papules, comedones  and intactpustules   -lower abdomen with 2-3 intact pustules and erythematous nodules   -numerous erythematous papules, pustules and comedones on the face, more on the lower face  -moderate rolling acne scars of the bilateral cheeks  -No other lesions of concern on areas examined.       Impression/Plan:  1. Folliculitis maybe early stage of Hidradenitis Suppurativa    Bacterial culture on the central abdomen     Will empirically treat with KEFLEX- 500 MG capsule, take 1 capsule by mouth 3 times daily    We will clinically monitor this area.    Today appears to be follicular in nature, but given distribution may be difficult to r/o HS at this time    If this is early HS, isotretinoin can be used as an off-label treatment    2. Acne vulgaris    Discussed hormonal breakouts  Will start isotretinoin 80mg next month. Goal dose is 220mg/kg for this 122.47 kg patient.   Method of contraception includes: OCP and Condoms, patient states that boyfriend has a vasectomy   Patient plans to see OBGYN this week to start oral contraceptives  Discussion of the risks and side effects of isotretinoin including but not limited to mucocutaneous dryness, arthralgias, myalgias, depression, suicidal ideation, headache, blurred vision, increase in liver function test and increase in lipids. The iPledge program brochure was provided and the contents discussed with the patient. The patient was counseled that they cannot give blood while on isotretinoin. Advised against tattoos and waxing. No personal or family history of inflammatory bowel disease or hypertriglyceridemia known to patient. Reviewed need to avoid alcohol on medication. The iPledge program consent was obtained. Patient counseled that if they wear contacts, the eyes may become too dry to tolerate. Recommend follow up with eye doctor if this occurs.    Discussed need for sun protection, at least SPF 30+.  Urine pregnancy test obtained today.  Next month: baseline labs including  qualitative hCG, CBC, GGT, BUN/Cr, fasting lipids and AST/ALT will be obtained.   Patient's iPledge # is [id number].   The patient will stop all other acne medications next month.  May take keflex this month as prescribed above for papules on the axillae an abdomen - this may help her acne temporarily. Plan to discontinue this next month.  Total dose: 0mg/kg    Continue Cetaphil skin care products       CC Dr. Flor on close of this encounter.  Follow-up in 1 months, earlier for new or changing lesions.       Staff Involved:    Scribe Disclosure  I, Zunilda Santana, am serving as a scribe to document services personally performed by Rebecca Koroma PA-C, based on data collection and the provider's statements to me.     Provider Disclosure:   The documentation recorded by the scribe accurately reflects the services I personally performed and the decisions made by me.    All risks, benefits and alternatives were discussed with patient.  Patient is in agreement and understands the assessment and plan.  All questions were answered.      Again, thank you for allowing me to participate in the care of your patient.      Sincerely,    Rebecca Koroma PA-C

## 2018-10-09 NOTE — PROGRESS NOTES
[facility] Dermatology Note      Dermatology Problem List:  1. Folliculitis maybe early stage of Hidradenitis Suppurativa -Kefelx 500mg po TID  -Bacterial culture on the central abdomen, 10/09/2018  -patient start isotretinoin next month  2. Acne vulgaris -Discussed starting Isotretinoin next month after discussing starting OCPs with OBGYN  -Current tx: Keflex 500mg po TID  -Previous tx: various topical medications, numerous oral antibiotics, spironolactone, OCPs      Encounter Date: Oct 9, 2018    CC:  Chief Complaint   Patient presents with     Acne     Steffi is here today to be seen for acne.          History of Present Illness:  Ms. Steffi Hernandez is a 38 year old female who is new to the dermatology clinic.The patient is here for an evaluation for acne.  At today's visit the patient reports that she has been breaking out underneath her arm pits, stomach, face, and very minimal on the chest/back. She has had facial acne since adolescence and she thought she would grow out of it, but never has. The acne in her axilla and her lower abdomen is new within the past several months.The patient reports that she has been on many different medications for acne - topicals and orals, and she cant really list all of them. She thinks oral abx and spirolactone and various prescription topicals. Uses bare minerals for her make up and cetaphil for moisturizer and cleanser. Reports that her acne is correlated to her menstrual cycle which she gets regularily. No hx UC or Chron's. She does have hx of depression/anxiety, but this is stable and she is not currently seeking treatment or therapy for it at this time. The patient denies painful, itching, tingling or bleeding lesions unless otherwise noted.      Past Medical History:   Patient Active Problem List   Diagnosis     Fatigue     Heavy menses     Memory difficulty     Allergic rhinosinusitis     CARDIOVASCULAR SCREENING; LDL GOAL LESS THAN 160     Acne      Acute maxillary sinusitis     Vitamin B12 deficiency without anemia     Vitamin D deficiency     Ascorbic acid deficiency     Iron deficiency     Moderate major depression (H)     Hirsutism     Pyridoxine deficiency     Dysmenorrhea     Family history of diabetes mellitus     Major depressive disorder, recurrent episode, moderate (HCC)     Morbid obesity, unspecified obesity type (H)     Chronic fatigue     Menorrhagia with regular cycle     Allergic rhinitis, unspecified allergic rhinitis type     LANDRY (obstructive sleep apnea)     Morbid obesity due to excess calories (H)     Sleep apnea, obstructive     Deviated nasal septum     Past Medical History:   Diagnosis Date     Allergic rhinitis, cause unspecified      Other acne      Sleep apnea     No treatment at this time.     Unspecified urinary incontinence      Past Surgical History:   Procedure Laterality Date     C NONSPECIFIC PROCEDURE      Urology surgery for night time bed wetting at age 6.     SEPTOPLASTY, TURBINOPLASTY, COMBINED N/A 2016    Procedure: COMBINED SEPTOPLASTY, TURBINOPLASTY;  Surgeon: Baldev Perez MD;  Location:  OR       Social History:   reports that she has never smoked. She has never used smokeless tobacco. She reports that she drinks alcohol. She reports that she does not use illicit drugs. Patient is sexually active with boyfriend - who has a vasectomy    Family History:  Family History   Problem Relation Age of Onset     Cerebrovascular Disease Father      x2     Hypertension Father      Seizure Disorder Father      Diabetes Paternal Grandfather      Cancer Paternal Grandfather        from throat cancer. Also had melanoma.     Blood Disease Paternal Grandfather      B12 DEFICIENCY - WAS ON B12 SHOTS     Cancer Maternal Grandmother       of colon cancer     Cerebrovascular Disease Maternal Grandfather      HEART DISEASE Maternal Grandfather            Neurologic Disorder Sister       Epilepsy diagnosed 1988     Neurologic Disorder Sister      Epilepsy diagnosed 1998       Medications:  Current Outpatient Prescriptions   Medication Sig Dispense Refill     fluticasone (FLONASE) 50 MCG/ACT spray Spray 2 sprays into both nostrils daily 1 Bottle 3     montelukast (SINGULAIR) 10 MG tablet Take 1 tablet (10 mg) by mouth At Bedtime INDICATION: TO TREAT ALLERGIC RHINITIS 30 tablet 11     sodium chloride (OCEAN) 0.65 % nasal spray Spray 2 sprays into both nostrils 2 times daily 1 Bottle 3     [DISCONTINUED] Ferrous Sulfate (SPATONE PUR-ABSORB IRON) 5 MG/20ML LIQD Take 1 packet by mouth 2 times daily (before meals). INDICATION: TO CORRECT IRON DEFICIENCY - PLEASE TAKE WITH EMERGEN-C MIXED IN 8  8 OZ OF WATER 1 Box PRN       Allergies   Allergen Reactions     Animal Dander      Cats      Dogs      No Known Drug Allergies      Rabbit Protein      Seasonal Allergies        Review of Systems:  -Constitutional: The patient denies fatigue, fevers, chills, unintended weight loss, and night sweats.  -Skin: As above in HPI. No additional skin concerns.  -Neuro: no HA or vision changes  -GI: No nausea, blood in stool, diarrhea, hx of IBD  -Psych: no depression/anxiety, mood changes, or sleep problems   -Musculoskeletal: no joint or muscle pain or swelling   -Heme/Lymph: no concerning bumps, no bleeding problems    Physical exam:  Vitals: weight 122.47 kg, 270lbs  GEN: This is a well developed, well-nourished female in no acute distress, in a pleasant mood.    SKIN: Waist-up skin, which includes the head/face, neck, both arms, chest, back, abdomen, digits and/or nails was examined.  -bilateral axilla with papules, comedones and intactpustules   -lower abdomen with 2-3 intact pustules and erythematous nodules   -numerous erythematous papules, pustules and comedones on the face, more on the lower face  -moderate rolling acne scars of the bilateral cheeks  -No other lesions of concern on areas examined.        Impression/Plan:  1. Folliculitis maybe early stage of Hidradenitis Suppurativa    Bacterial culture on the central abdomen     Will empirically treat with KEFLEX- 500 MG capsule, take 1 capsule by mouth 3 times daily    We will clinically monitor this area.    Today appears to be follicular in nature, but given distribution may be difficult to r/o HS at this time    If this is early HS, isotretinoin can be used as an off-label treatment    2. Acne vulgaris    Discussed hormonal breakouts  Will start isotretinoin 80mg next month. Goal dose is 220mg/kg for this 122.47 kg patient.   Method of contraception includes: OCP and Condoms, patient states that boyfriend has a vasectomy   Patient plans to see OBGYN this week to start oral contraceptives  Discussion of the risks and side effects of isotretinoin including but not limited to mucocutaneous dryness, arthralgias, myalgias, depression, suicidal ideation, headache, blurred vision, increase in liver function test and increase in lipids. The iPledge program brochure was provided and the contents discussed with the patient. The patient was counseled that they cannot give blood while on isotretinoin. Advised against tattoos and waxing. No personal or family history of inflammatory bowel disease or hypertriglyceridemia known to patient. Reviewed need to avoid alcohol on medication. The iPledge program consent was obtained. Patient counseled that if they wear contacts, the eyes may become too dry to tolerate. Recommend follow up with eye doctor if this occurs.    Discussed need for sun protection, at least SPF 30+.  Urine pregnancy test obtained today.  Next month: baseline labs including qualitative hCG, CBC, GGT, BUN/Cr, fasting lipids and AST/ALT will be obtained.   Patient's iPledge # is [phone].   The patient will stop all other acne medications next month.  May take keflex this month as prescribed above for papules on the axillae an abdomen - this may help  her acne temporarily. Plan to discontinue this next month.  Total dose: 0mg/kg    Continue Cetaphil skin care products       CC Dr. Flor on close of this encounter.  Follow-up in 1 months, earlier for new or changing lesions.       Staff Involved:    Scribe Disclosure  I, Zunilda Santana, am serving as a scribe to document services personally performed by Rebecca Koroma PA-C, based on data collection and the provider's statements to me.     Provider Disclosure:   The documentation recorded by the scribe accurately reflects the services I personally performed and the decisions made by me.    All risks, benefits and alternatives were discussed with patient.  Patient is in agreement and understands the assessment and plan.  All questions were answered.    Rebecca Koroma PA-C  [facility]: Phone: [phone], Fax: [phone]

## 2018-10-09 NOTE — MR AVS SNAPSHOT
After Visit Summary   10/9/2018    Steffi Hernandez    MRN: [id number]           Patient Information     Date Of Birth          1980        Visit Information        Provider Department      10/9/2018 12:00 PM Rebecca Koroma PA-C [facility]        Today's Diagnoses     Acne vulgaris    -  1    Folliculitis        On isotretinoin therapy           Follow-ups after your visit        Follow-up notes from your care team     Return in about 4 weeks (around 11/6/2018).      Your next 10 appointments already scheduled     Nov 09, 2018 10:45 AM CST   (Arrive by 10:30 AM)   Return Visit with KATE Herring [facility] ([facility] [place])    [address] [address] [phone]              Who to contact     Please call your clinic at [phone] to:    Ask questions about your health    Make or cancel appointments    Discuss your medicines    Learn about your test results    Speak to your doctor            Additional Information About Your Visit        [employer] Information     [email or web address] gives you secure access to your electronic health record. If you see a primary care provider, you can also send messages to your care team and make appointments. If you have questions, please call your primary care clinic.  If you do not have a primary care provider, please call [phone] and they will assist you.      [email or web address] is an electronic gateway that provides easy, online access to your medical records. With [email or web address], you can request a clinic appointment, read your test results, renew a prescription or communicate with your care team.     To access your existing account, please contact your [facility] or call [phone] for assistance.        Care EveryWhere ID     This is your Care EveryWhere ID. This could be used by other organizations to access your [place] medical records  [id number]          Blood Pressure from Last 3 Encounters:   03/29/18 (!) 136/92   12/29/17 (!) 124/98   11/15/16 110/70    Weight from Last 3 Encounters:   03/29/18 121.4 kg (267 lb 11.2 oz)   12/29/17 120 kg (264 lb 9.6 oz)   11/15/16 124.1 kg (273 lb 8 oz)              We Performed the Following     HCG Qual, Urine - LANIE,  All Frankel  ([id number])     Skin Culture Aerobic Bacterial          Today's Medication Changes          These changes are accurate as of 10/9/18  1:45 PM.  If you have any questions, ask your nurse or doctor.               Start taking these medicines.        Dose/Directions    cephALEXin 500 MG capsule   Commonly known as:  KEFLEX   Used for:  Acne vulgaris, Folliculitis   Started by:  Rebecca Koroma PA-C        Dose:  500 mg   Take 1 capsule (500 mg) by mouth 3 times daily   Quantity:  90 capsule   Refills:  0            Where to get your medicines      These medications were sent to [facility] [address] [place] [address] [address] AT [facility]  [address], [facility] [address]     Phone:  [phone]     cephALEXin 500 MG capsule                Primary Care Provider Office Phone # Fax #    Gee Cadet [phone]       [address]  [facility] [address]        Equal Access to Services     RAGHU JONES AH: Hadii aad ku hadasho Soomaali, waaxda luqadaha, qaybta kaalmada adeegyada, irene sorenson. So [facility] [phone].    ATENCIÓN: Si habla español, tiene a sy disposición servicios gratuitos de asistencia lingüística. Tor augustin [phone].    We comply with applicable federal civil rights laws and [place] laws. We do not discriminate on the basis of race, color, national origin, age, disability, sex, sexual orientation, or gender identity.            Thank you!     Thank you for choosing [facility]  for your care. Our goal is always to provide you with excellent care. Hearing back from  our patients is one way we can continue to improve our services. Please take a few minutes to complete the written survey that you may receive in the mail after your visit with us. Thank you!             Your Updated Medication List - Protect others around you: Learn how to safely use, store and throw away your medicines at www.disposemymeds.org.          This list is accurate as of 10/9/18  1:45 PM.  Always use your most recent med list.                   Brand Name Dispense Instructions for use Diagnosis    cephALEXin 500 MG capsule    KEFLEX    90 capsule    Take 1 capsule (500 mg) by mouth 3 times daily    Acne vulgaris, Folliculitis       fluticasone 50 MCG/ACT spray    FLONASE    1 Bottle    Spray 2 sprays into both nostrils daily    Chronic non-seasonal allergic rhinitis, unspecified trigger       montelukast 10 MG tablet    SINGULAIR    30 tablet    Take 1 tablet (10 mg) by mouth At Bedtime INDICATION: TO TREAT ALLERGIC RHINITIS    Chronic non-seasonal allergic rhinitis, unspecified trigger       sodium chloride 0.65 % nasal spray    OCEAN    1 Bottle    Spray 2 sprays into both nostrils 2 times daily    Chronic non-seasonal allergic rhinitis, unspecified trigger

## 2018-10-11 ENCOUNTER — OFFICE VISIT (OUTPATIENT)
Dept: OBGYN | Facility: CLINIC | Age: 38
End: 2018-10-11
Attending: OBSTETRICS & GYNECOLOGY
Payer: COMMERCIAL

## 2018-10-11 VITALS
HEIGHT: 65 IN | HEART RATE: 88 BPM | BODY MASS INDEX: 44.45 KG/M2 | DIASTOLIC BLOOD PRESSURE: 95 MMHG | SYSTOLIC BLOOD PRESSURE: 133 MMHG | WEIGHT: 266.8 LBS

## 2018-10-11 DIAGNOSIS — Z01.419 ENCOUNTER FOR GYNECOLOGICAL EXAMINATION WITHOUT ABNORMAL FINDING: Primary | ICD-10-CM

## 2018-10-11 DIAGNOSIS — N39.46 URINARY INCONTINENCE, MIXED: ICD-10-CM

## 2018-10-11 DIAGNOSIS — Z12.4 SCREENING FOR MALIGNANT NEOPLASM OF CERVIX: ICD-10-CM

## 2018-10-11 DIAGNOSIS — Z30.011 ENCOUNTER FOR INITIAL PRESCRIPTION OF CONTRACEPTIVE PILLS: ICD-10-CM

## 2018-10-11 PROCEDURE — G0145 SCR C/V CYTO,THINLAYER,RESCR: HCPCS | Performed by: OBSTETRICS & GYNECOLOGY

## 2018-10-11 PROCEDURE — G0463 HOSPITAL OUTPT CLINIC VISIT: HCPCS | Mod: ZF

## 2018-10-11 PROCEDURE — 87624 HPV HI-RISK TYP POOLED RSLT: CPT | Performed by: OBSTETRICS & GYNECOLOGY

## 2018-10-11 RX ORDER — NORGESTIMATE AND ETHINYL ESTRADIOL 0.25-0.035
1 KIT ORAL DAILY
Qty: 84 TABLET | Refills: 3 | Status: SHIPPED | OUTPATIENT
Start: 2018-10-11 | End: 2019-09-10

## 2018-10-11 NOTE — LETTER
10/11/2018       RE: Steffi Hernandez  [address]  [place] [address]     Dear Colleague,    Thank you for referring your patient, Steffi Hernandez, to the [facility] at [facility]. Please see a copy of my visit note below.    Gynecology Visit Note  10/11/18    Reason for visit: Breast/Pelvic/Pap, Discuss contraception    HPI: Patient is a 37 yo nulligravid female who presents today for breast, pelvic and pap smear but also specifically to discuss options for contraception as desires to start Accutane for acne, but will not be a candidate for this without form on contraception.  Patient has been on OCP and used condoms in past.  Not currently using anything.  Sexually active with male partner who apparently plans vasectomy in next 6 months.  That being said, she wants to be sure she has contraception.  She does mention she never wants to be pregnant and would consider sterilization as well.  But she wants to start something right now so she can get started with the Accutane.    In addition to these issues, patient states she has been noting issues with urinary incontinence that have become more pronounced recently.  She states she has leaking with coughing, laughing, sneezing.  She also has episodes where when she feels urge to urinate, she has to go immediately or she will leak.  She would like to see someone about this as it is bothersome to her.    Past OB/GYN History:  Nulligravid  Menses: LMP 9/25/18.  Regular every month.  Lasts 4-6 days, fairly heavy flow, does get some pretty bad cramping assisted with Midol, especially on day 2.  Denies intermenstrual bleeding.  No STI history  History of female and male partners, currently with male partner  No contraception currently, desires today  Pap smear history: No abnormal history, due today, lats 5/2013 NILM  No concerning discharge, No dyspareunia  Declines STI Screening today    Past  Medical History:   Diagnosis Date     Allergic rhinitis, cause unspecified      Other acne      Sleep apnea      Unspecified urinary incontinence      Past Surgical History:   Procedure Laterality Date     C NONSPECIFIC PROCEDURE      Urology surgery for night time bed wetting at age 6.     SEPTOPLASTY, TURBINOPLASTY, COMBINED N/A 2016    Procedure: COMBINED SEPTOPLASTY, TURBINOPLASTY;  Surgeon: Baldev Perez MD;  Location:  OR       Current Outpatient Prescriptions on File Prior to Visit:  cephALEXin (KEFLEX) 500 MG capsule Take 1 capsule (500 mg) by mouth 3 times daily   fluticasone (FLONASE) 50 MCG/ACT spray Spray 2 sprays into both nostrils daily   montelukast (SINGULAIR) 10 MG tablet Take 1 tablet (10 mg) by mouth At Bedtime INDICATION: TO TREAT ALLERGIC RHINITIS   sodium chloride (OCEAN) 0.65 % nasal spray Spray 2 sprays into both nostrils 2 times daily   [DISCONTINUED] Ferrous Sulfate (SPATONE PUR-ABSORB IRON) 5 MG/20ML LIQD Take 1 packet by mouth 2 times daily (before meals). INDICATION: TO CORRECT IRON DEFICIENCY - PLEASE TAKE WITH EMERGEN-C MIXED IN 8  8 OZ OF WATER     No current facility-administered medications on file prior to visit.      Allergies   Allergen Reactions     Animal Dander      Cats      Dogs      Rabbit Protein      Seasonal Allergies      Social History   Substance Use Topics     Smoking status: Never Smoker     Smokeless tobacco: Never Used     Alcohol use Yes      Comment: 6 drinks weekly     Family History   Problem Relation Age of Onset     Cerebrovascular Disease Father      x2     Hypertension Father      Seizure Disorder Father      Diabetes Paternal Grandfather      Cancer Paternal Grandfather        from throat cancer. Also had melanoma.     Blood Disease Paternal Grandfather      B12 DEFICIENCY - WAS ON B12 SHOTS     Cancer Maternal Grandmother       of colon cancer     Cerebrovascular Disease Maternal Grandfather      HEART DISEASE Maternal  "Grandfather            Neurologic Disorder Sister      Epilepsy diagnosed      Neurologic Disorder Sister      Epilepsy diagnosed      ROS: A complete 10 point ROS was conducted today and was negative aside from that noted in the HPI    O:  Vitals:    10/11/18 0809   BP: (!) 133/95   Pulse: 88   Weight: 121 kg (266 lb 12.8 oz)   Height: 1.651 m (5' 5\")     General: NAD, A&Ox3  Breasts: Symmetrical, No lymphadenopathy, skin changes, nipple discharge or nodules appreciated bilaterally  Pelvic: EGUBS wnl.  Vagina well rugated, no discharge in vault.  Cervix nulliparous without lesions.  Pap collected.  Uterus is small, mobile, anteverted, no obvious masses.  Can not appreciate adnexa secondary to habitus, but no tenderness or masses obvious on exam.    A/P: 37 yo nulligravid female presents for breast, pap and pelvic and to discuss contraception options  1) Normal breast and pelvic exam  2) Screening for malignant neoplasm of cervix: Pap with cotesting, if NILM/HPV negative, repeat in 5 years  3) Contraception: Counseled on options of combined hormonal, progesterone only and non-hormonal options today and associated r/b and side effects of each.  We also discussed sterilization options given her desire to never have children in lifetime.  After discussion and taking her heavy menses, dysmenorrhea and acne into consideration, patient desires to start OCP.  Rx sent to requested pharmacy for OCP with low androgen potency.  She will call or send [employer] message with any problem when initiating this pill.  4) Mixed urinary incontinence: Will have patient see Dr. Keenan to evaluate further and discuss potential options for management.  5) RTC in 1 year for annual, earlier with any concerns    Anisha Diana MD      "

## 2018-10-11 NOTE — MR AVS SNAPSHOT
After Visit Summary   10/11/2018    Steffi Hernandez    MRN: [id number]           Patient Information     Date Of Birth          1980        Visit Information        Provider Department      10/11/2018 8:00 AM Anisha Diana MD [facility]        Today's Diagnoses     Encounter for gynecological examination without abnormal finding    -  1    Encounter for initial prescription of contraceptive pills        Screening for malignant neoplasm of cervix        Urinary incontinence, mixed           Follow-ups after your visit        Additional Services     UROLOGY ADULT REFERRAL       Your provider has referred you to: Dr. Keenan    Please be aware that coverage of these services is subject to the terms and limitations of your health insurance plan.  Call member services at your health plan with any benefit or coverage questions.      Please bring the following with you to your appointment:    (1) Any X-Rays, CTs or MRIs which have been performed.  Contact the facility where they were done to arrange for  prior to your scheduled appointment.    (2) List of current medications  (3) This referral request   (4) Any documents/labs given to you for this referral                  Your next 10 appointments already scheduled     Oct 18, 2018  8:00 AM CDT   New Urogyn with Jorge Keenan MD   [facility] ([facility])    [address], [address] [place] [address], [address] [address] [phone]           Please call [facility] , [phone], with any questions or concerns you may have regarding your appointment.            Nov 09, 2018 10:45 AM CST   (Arrive by 10:30 AM)   Return Visit with Rebecca Koroma PA-C   [facility] ([facility])    [address]  3rd Floor  [facility] [address] [phone]              Who to contact     Please call your clinic  "at [phone] to:    Ask questions about your health    Make or cancel appointments    Discuss your medicines    Learn about your test results    Speak to your doctor            Additional Information About Your Visit        [employer] Information     [facility] [employer] gives you secure access to your electronic health record. If you see a primary care provider, you can also send messages to your care team and make appointments. If you have questions, please call your primary care clinic.  If you do not have a primary care provider, please call [phone] and they will assist you.      [facility] [employer] is an electronic gateway that provides easy, online access to your medical records. With [facility], you can request a clinic appointment, read your test results, renew a prescription or communicate with your care team.     To access your existing account, please contact your [facility] or call [phone] for assistance.        Care EveryWhere ID     This is your Care EveryWhere ID. This could be used by other organizations to access your [facility] medical records  [id number]        Your Vitals Were     Pulse Height Last Period BMI (Body Mass Index)          88 1.651 m (5' 5\") 09/25/2018 (Exact Date) 44.4 kg/m2         Blood Pressure from Last 3 Encounters:   10/11/18 (!) 133/95   03/29/18 (!) 136/92   12/29/17 (!) 124/98    Weight from Last 3 Encounters:   10/11/18 121 kg (266 lb 12.8 oz)   03/29/18 121.4 kg (267 lb 11.2 oz)   12/29/17 120 kg (264 lb 9.6 oz)              We Performed the Following     HPV High Risk Types DNA Cervical     Obtaining, preparing and conveyance of cervical or vaginal smear to laboratory.     Pap imaged thin layer screen with HPV - recommended age 30 - 65 years (select HPV order below)     UROLOGY ADULT REFERRAL          Today's Medication Changes          These changes are accurate as of 10/11/18  9:08 AM.  If you have any questions, ask your nurse or doctor.             "   Start taking these medicines.        Dose/Directions    norgestimate-ethinyl estradiol 0.25-35 MG-MCG per tablet   Commonly known as:  ORTHO-CYCLEN, SPRINTEC   Used for:  Encounter for initial prescription of contraceptive pills   Started by:  Anisha Diana MD        Dose:  1 tablet   Take 1 tablet by mouth daily   Quantity:  84 tablet   Refills:  3            Where to get your medicines      These medications were sent to [facility] [id number] [place] [address] [address] AT [facility] [place] [facility] [address], [facility] [address]     Phone:  [phone]     norgestimate-ethinyl estradiol 0.25-35 MG-MCG per tablet                Primary Care Provider Office Phone # Fax #    Gee Cadte [phone]       [address]  [facility] [address]        Equal Access to Services     RAGHU JONES : Hadii larissa ku hadasho Soomaali, waaxda luqadaha, qaybta kaalmada adeegyada, waxay salimain denis sorenson. So [facility] [place] [phone].    ATENCIÓN: Si habla español, tiene a sy disposición servicios gratuitos de asistencia lingüística. [facility] [phone].    We comply with applicable federal civil rights laws and [place] laws. We do not discriminate on the basis of race, color, national origin, age, disability, sex, sexual orientation, or gender identity.            Thank you!     Thank you for choosing [facility]  for your care. Our goal is always to provide you with excellent care. Hearing back from our patients is one way we can continue to improve our services. Please take a few minutes to complete the written survey that you may receive in the mail after your visit with us. Thank you!             Your Updated Medication List - Protect others around you: Learn how to safely use, store and throw away your medicines at www.disposemymeds.org.          This list is accurate as of 10/11/18  9:08 AM.  Always use your most  recent med list.                   Brand Name Dispense Instructions for use Diagnosis    cephALEXin 500 MG capsule    KEFLEX    90 capsule    Take 1 capsule (500 mg) by mouth 3 times daily    Acne vulgaris, Folliculitis       fluticasone 50 MCG/ACT spray    FLONASE    1 Bottle    Spray 2 sprays into both nostrils daily    Chronic non-seasonal allergic rhinitis, unspecified trigger       montelukast 10 MG tablet    SINGULAIR    30 tablet    Take 1 tablet (10 mg) by mouth At Bedtime INDICATION: TO TREAT ALLERGIC RHINITIS    Chronic non-seasonal allergic rhinitis, unspecified trigger       norgestimate-ethinyl estradiol 0.25-35 MG-MCG per tablet    ORTHO-CYCLEN, SPRINTEC    84 tablet    Take 1 tablet by mouth daily    Encounter for initial prescription of contraceptive pills       sodium chloride 0.65 % nasal spray    OCEAN    1 Bottle    Spray 2 sprays into both nostrils 2 times daily    Chronic non-seasonal allergic rhinitis, unspecified trigger

## 2018-10-11 NOTE — PROGRESS NOTES
Gynecology Visit Note  10/11/18    Reason for visit: Breast/Pelvic/Pap, Discuss contraception    HPI: Patient is a 39 yo nulligravid female who presents today for breast, pelvic and pap smear but also specifically to discuss options for contraception as desires to start Accutane for acne, but will not be a candidate for this without form on contraception.  Patient has been on OCP and used condoms in past.  Not currently using anything.  Sexually active with male partner who apparently plans vasectomy in next 6 months.  That being said, she wants to be sure she has contraception.  She does mention she never wants to be pregnant and would consider sterilization as well.  But she wants to start something right now so she can get started with the Accutane.    In addition to these issues, patient states she has been noting issues with urinary incontinence that have become more pronounced recently.  She states she has leaking with coughing, laughing, sneezing.  She also has episodes where when she feels urge to urinate, she has to go immediately or she will leak.  She would like to see someone about this as it is bothersome to her.    Past OB/GYN History:  Nulligravid  Menses: LMP 9/25/18.  Regular every month.  Lasts 4-6 days, fairly heavy flow, does get some pretty bad cramping assisted with Midol, especially on day 2.  Denies intermenstrual bleeding.  No STI history  History of female and male partners, currently with male partner  No contraception currently, desires today  Pap smear history: No abnormal history, due today, lats 5/2013 NILM  No concerning discharge, No dyspareunia  Declines STI Screening today    Past Medical History:   Diagnosis Date     Allergic rhinitis, cause unspecified      Other acne      Sleep apnea      Unspecified urinary incontinence      Past Surgical History:   Procedure Laterality Date     C NONSPECIFIC PROCEDURE      Urology surgery for night time bed wetting at age 6.     SEPTOPLASTY,  TURBINOPLASTY, COMBINED N/A 2016    Procedure: COMBINED SEPTOPLASTY, TURBINOPLASTY;  Surgeon: Baldev Perez MD;  Location:  OR       Current Outpatient Prescriptions on File Prior to Visit:  cephALEXin (KEFLEX) 500 MG capsule Take 1 capsule (500 mg) by mouth 3 times daily   fluticasone (FLONASE) 50 MCG/ACT spray Spray 2 sprays into both nostrils daily   montelukast (SINGULAIR) 10 MG tablet Take 1 tablet (10 mg) by mouth At Bedtime INDICATION: TO TREAT ALLERGIC RHINITIS   sodium chloride (OCEAN) 0.65 % nasal spray Spray 2 sprays into both nostrils 2 times daily   [DISCONTINUED] Ferrous Sulfate (SPATONE PUR-ABSORB IRON) 5 MG/20ML LIQD Take 1 packet by mouth 2 times daily (before meals). INDICATION: TO CORRECT IRON DEFICIENCY - PLEASE TAKE WITH EMERGEN-C MIXED IN 8  8 OZ OF WATER     No current facility-administered medications on file prior to visit.      Allergies   Allergen Reactions     Animal Dander      Cats      Dogs      Rabbit Protein      Seasonal Allergies      Social History   Substance Use Topics     Smoking status: Never Smoker     Smokeless tobacco: Never Used     Alcohol use Yes      Comment: 6 drinks weekly     Family History   Problem Relation Age of Onset     Cerebrovascular Disease Father      x2     Hypertension Father      Seizure Disorder Father      Diabetes Paternal Grandfather      Cancer Paternal Grandfather        from throat cancer. Also had melanoma.     Blood Disease Paternal Grandfather      B12 DEFICIENCY - WAS ON B12 SHOTS     Cancer Maternal Grandmother       of colon cancer     Cerebrovascular Disease Maternal Grandfather      HEART DISEASE Maternal Grandfather            Neurologic Disorder Sister      Epilepsy diagnosed      Neurologic Disorder Sister      Epilepsy diagnosed      ROS: A complete 10 point ROS was conducted today and was negative aside from that noted in the HPI    O:  Vitals:    10/11/18 0809   BP: (!) 133/95  "  Pulse: 88   Weight: 121 kg (266 lb 12.8 oz)   Height: 1.651 m (5' 5\")     General: NAD, A&Ox3  Breasts: Symmetrical, No lymphadenopathy, skin changes, nipple discharge or nodules appreciated bilaterally  Pelvic: EGUBS wnl.  Vagina well rugated, no discharge in vault.  Cervix nulliparous without lesions.  Pap collected.  Uterus is small, mobile, anteverted, no obvious masses.  Can not appreciate adnexa secondary to habitus, but no tenderness or masses obvious on exam.    A/P: 39 yo nulligravid female presents for breast, pap and pelvic and to discuss contraception options  1) Normal breast and pelvic exam  2) Screening for malignant neoplasm of cervix: Pap with cotesting, if NILM/HPV negative, repeat in 5 years  3) Contraception: Counseled on options of combined hormonal, progesterone only and non-hormonal options today and associated r/b and side effects of each.  We also discussed sterilization options given her desire to never have children in lifetime.  After discussion and taking her heavy menses, dysmenorrhea and acne into consideration, patient desires to start OCP.  Rx sent to requested pharmacy for OCP with low androgen potency.  She will call or send [employer] message with any problem when initiating this pill.  4) Mixed urinary incontinence: Will have patient see Dr. Keenan to evaluate further and discuss potential options for management.  5) RTC in 1 year for annual, earlier with any concerns    Anisha Diana MD    "

## 2018-10-13 LAB
BACTERIA SPEC CULT: ABNORMAL
SPECIMEN SOURCE: ABNORMAL

## 2018-10-15 LAB
COPATH REPORT: NORMAL
PAP: NORMAL

## 2018-10-16 ENCOUNTER — TELEPHONE (OUTPATIENT)
Dept: DERMATOLOGY | Facility: CLINIC | Age: 38
End: 2018-10-16

## 2018-10-16 DIAGNOSIS — L73.9 STAPHYLOCOCCUS AUREUS SUPERFICIAL FOLLICULITIS: Primary | ICD-10-CM

## 2018-10-16 DIAGNOSIS — B95.61 STAPHYLOCOCCUS AUREUS SUPERFICIAL FOLLICULITIS: Primary | ICD-10-CM

## 2018-10-16 LAB
FINAL DIAGNOSIS: ABNORMAL
HPV HR 12 DNA CVX QL NAA+PROBE: POSITIVE
HPV16 DNA SPEC QL NAA+PROBE: NEGATIVE
HPV18 DNA SPEC QL NAA+PROBE: NEGATIVE
SPECIMEN DESCRIPTION: ABNORMAL
SPECIMEN SOURCE CVX/VAG CYTO: ABNORMAL

## 2018-10-16 RX ORDER — DOXYCYCLINE 100 MG/1
100 CAPSULE ORAL 2 TIMES DAILY
Qty: 20 CAPSULE | Refills: 0 | Status: SHIPPED | OUTPATIENT
Start: 2018-10-16 | End: 2018-11-13

## 2018-10-16 NOTE — TELEPHONE ENCOUNTER
Results discussed with patient. She will take doxy for 2 weeks. Asked if she should re-start keflex after doxy completed, Rebecca states she does not need to re-start keflex as there will be no benefit. Patient understands, no further questions.

## 2018-10-18 ENCOUNTER — OFFICE VISIT (OUTPATIENT)
Dept: UROLOGY | Facility: CLINIC | Age: 38
End: 2018-10-18
Attending: OBSTETRICS & GYNECOLOGY
Payer: COMMERCIAL

## 2018-10-18 VITALS
HEIGHT: 65 IN | DIASTOLIC BLOOD PRESSURE: 83 MMHG | BODY MASS INDEX: 44.4 KG/M2 | SYSTOLIC BLOOD PRESSURE: 122 MMHG | HEART RATE: 76 BPM | WEIGHT: 266.5 LBS

## 2018-10-18 DIAGNOSIS — N39.3 SUI (STRESS URINARY INCONTINENCE, FEMALE): Primary | ICD-10-CM

## 2018-10-18 PROCEDURE — G0463 HOSPITAL OUTPT CLINIC VISIT: HCPCS | Mod: ZF

## 2018-10-18 RX ORDER — PHENAZOPYRIDINE HYDROCHLORIDE 100 MG/1
200 TABLET, FILM COATED ORAL ONCE
Status: CANCELLED | OUTPATIENT
Start: 2018-10-18 | End: 2018-10-18

## 2018-10-18 RX ORDER — CEFAZOLIN SODIUM IN 0.9 % NACL 3 G/100 ML
3 INTRAVENOUS SOLUTION, PIGGYBACK (ML) INTRAVENOUS
Status: CANCELLED | OUTPATIENT
Start: 2018-10-18

## 2018-10-18 RX ORDER — CEFAZOLIN SODIUM 1 G/3ML
1 INJECTION, POWDER, FOR SOLUTION INTRAMUSCULAR; INTRAVENOUS SEE ADMIN INSTRUCTIONS
Status: CANCELLED | OUTPATIENT
Start: 2018-10-18

## 2018-10-18 ASSESSMENT — PAIN SCALES - GENERAL: PAINLEVEL: NO PAIN (0)

## 2018-10-18 NOTE — LETTER
10/18/2018       RE: Steffi Hernandez  [address]  [place] [address]     Dear Colleague,    Thank you for referring your patient, Steffi Hernandez, to the [facility] at [facility]. Please see a copy of my visit note below.    October 18, 2018    Referring Provider: Referred Self, MD  No address on file    Primary Care Provider: Gee Cadet    CC: urinary incontinence    HPI:  Steffi Hernandez is a 38 year old female who presents for evaluation of her pelvic floor symptoms.  Patient has had episodes of incontinence with coughing, sneezing over the past several years. Have become more bothersome to her recently. Describes being nervous about leaking anytime she coughs or sneezes as has been leaking even immediately after voiding. Typically is not enough to wear a pad (small amounts) but also experiences weekly episodes of urgency. With these episodes, if she does not reach the bathroom immediately soaks her underwear and has had to leave work. Notes that she was seen for evaluation and describes what sounds like urodynamic testing. Was told she should just drink less water but did not feel this was useful advice as she only drinks about 72oz of liquid a day.     Prolapse:  Do you feel a vaginal bulge? no         Pressure? no   Do you have to place your fingers in the vagina or in the rectum to have a bowel movement? No   Impact to quality of life? NA    Stress Incontinence:  Do you leak urine with cough, sneeze, exercise? yes  How often do you leak with cough, sneeze, exercise?  daily  How much do you usually leak? more  Do you wear a pad? No  If so; Na  Impact to quality of life? moderate    Urge Incontinence:  Do you often get sudden urges to urinate? yes  How often do have urges? weekly  If so, do you leak with these urges? yes  How much do you usually leak? soak  Impact to quality of life?severe     Voids/day: 10-12  Nocturia:  2  Fluid intake: 64-72oz  Caffeine: 1/day    Urinating:  Difficulty starting urination or strain to void? no  Weak or intermittent stream? no  Incomplete emptying or dribbling? yes  Pain or burning with urination? No   Any blood in your urine? No     GI:  Constipation? no  Frequency stools daily  Straining for stools no  Stool consistency normal     Ever leak stool (Accidental Bowel Leakage)? yes      If so, how often?               occ      If so, do you leak?                  liquid      Soiling without sensation? no  History of irritable bowel or Crohn's? no    Sexual/Pain:  Are you currently having sex?. No   Pain with sex?   No   Sexual Partner: male   Do any of these symptoms interfere with sex? no  Impact to quality of life? No     Prior therapy:  Ever done pelvic floor physical therapy? No   Trial of medication? yes  Have you ever tried a pessary? No     Medical History:  Do you have?   High Cholesterol? No   Diabetes? No   High Blood pressure? No   Recurrent UTIs? No   Sleep Apnea? Yes   Other medical problems: NA    Surgical History:    Hysterectomy? No   Bladder Surgery? Urethrotomy age 6  Other? septoplasty    OB/Gyn History:  Pregnancies? 0  Deliveries? 0  Vaginal 0   Section 0   Current birth control? 0   Periods? yes  When was the first day of your last period? Patient's last menstrual period was 2018 (exact date).   Last Pap smear? 10/2018 NILM  Last mammogram? NA Last colonoscopy? NA    Medications/Vitamins/Supplements:   Flonase  Doxycycline  Singulair  Ocean spray    Drug Allergies: none   Latex Allergy: none   Iodine Allergy none   Allergies: Seasonal, animal dander (cats, dogs, rabbits)    Family History:   MFM colon cancer  PGF throat cancer  DM, HTN, CVA  Family History   Problem Relation Age of Onset     Cerebrovascular Disease Father      x2     Hypertension Father      Seizure Disorder Father      Diabetes Paternal Grandfather      Cancer Paternal Grandfather        "2012 from throat cancer. Also had melanoma.     Blood Disease Paternal Grandfather      B12 DEFICIENCY - WAS ON B12 SHOTS     Cancer Maternal Grandmother       of colon cancer     Cerebrovascular Disease Maternal Grandfather      HEART DISEASE Maternal Grandfather            Neurologic Disorder Sister      Epilepsy diagnosed      Neurologic Disorder Sister      Epilepsy diagnosed      Social History:  Marital status: single  Do you/ have you ever smoke(d)  cigarettes?  no  Drink more than 1 alcoholic beverage a day?  Social drinker  Occupation? Works on [employer] at [employer]    In the past 3 months have you regularly experienced:  Chest pain w/ walking/exercise? No   Unusual headaches? No   Leg pain w/ walking/exercise? No   Easy bruising? Yes  Difficulty breathing w/ walking/exercise? No   Problems with vision? No   Dizziness, falls, or fainting? No   Excessive bleeding from cuts, gums, surgery? No   Other: NA    Past medical history:  Allergies  Acne  Sleep apnea  Urinary incontinence     Past surgical history:  Urethrotomy   Septoplasty    Social history:  Patient is a non smoker. Social alcohol use. No drug use.     ROS  10 pt ROS neg other than HPI      /83  Pulse 76  Ht 1.651 m (5' 5\")  Wt 120.9 kg (266 lb 8 oz)  LMP 2018 (Exact Date)  BMI 44.35 kg/m2 Patient's last menstrual period was 2018 (exact date). Body mass index is 44.35 kg/(m^2).  Patient  is alert, comfortable in no acute distress, non-labored breathing.   Abdomen is soft, non-tender, non-distended, no CVAT.    Normal external female genitalia. The urethra was hypermobile.    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   Bimanual exam notable for uterus of normal size and contour, no adnexal masses.  3/5 kegels.    Rectal exam with normal tone, no masses or " tenderness.    POPQ  Aa 0  Ba 0   C -6  D -8  GH 3  PB 3  TVL 10  Ap -2  Bp -2    + SST  VOID 50 ml  PVR 50 mL in and out cath  Urine dip ND    A/P: Steffi Hernandez is a 38 year old F with mixed urinary incontinence. In discussion with patient her stress incontinence is a daily issue while the urge incontinence is a weekly issue, but more bothersome when it does occur.    We discussed the diagnosis and treatment options for both diagnoses, reviewing that the options for therapy are different.    1) Stress urinary incontinence: Reviewed with patient that treatment options to include:  1) observation with f/u in 6 -12 months  2) pelvic floor physical therapy  3) surgical management    We discussed her diagnosis and reviewed the [employer] On-Line Interactive POP-Q Tool about stress urinary incontinence regarding diagnosis and treatment of DANILO. Surgery would be TVT for urinary incontinence. We discussed that the risks to the procedure include but not limited to bleeding, infection, injury to adjacent organs including bowel, bladder, and blood vessels, conversion to open procedure, de irina or worsening stress incontinence or urge incontinence, need for post-operative lamas catheter, need for further procedures including for mesh extrusion or erosion and possible failure of the procedure with recurrence of her prolapse.  Patient expressed understanding and agreeable to proceed with this procedure.    2) Urge urinary incontinence: We discussed the diagnosis and treatment options. Pt has urgency and urge incontinence. Reviewed treatment options including: observation, pelvic floor physical therapy and bladder training, anti-cholinergic medications or myrbetriq, botox and Interstim. Given that she desires surgical management of her DANILO discussed that would plan to let her recover from surgery and then determine to what her urge incontinence remains an issue. If continued symptoms, patient would be amenable to a trial of pelvic  physical therapy and medication.     Es Eli PGY4   10/18/2018 9:28 AM     A total of 40 minutes were spent with the patient today, > 50% in counseling and coordination of care    I attest that I was present for the history and physical and that I agree with the findings and treatment plan outlined above.    Jorge Keenan MD  Professor, OB/GYN  Urogynecologist    CC  Patient Care Team:  Gee Cadet MD as PCP - General (Student in organized health care education/training program)  SELF, REFERRED

## 2018-10-18 NOTE — NURSING NOTE
Patient tested positive for urinary stress incontinence  Voided 50 ml  And residual urine was 52 ml

## 2018-10-18 NOTE — PROGRESS NOTES
October 18, 2018    Referring Provider: Referred Self, MD  No address on file    Primary Care Provider: Gee Cadet    CC: urinary incontinence    HPI:  Steffi Hernandez is a 38 year old female who presents for evaluation of her pelvic floor symptoms.  Patient has had episodes of incontinence with coughing, sneezing over the past several years. Have become more bothersome to her recently. Describes being nervous about leaking anytime she coughs or sneezes as has been leaking even immediately after voiding. Typically is not enough to wear a pad (small amounts) but also experiences weekly episodes of urgency. With these episodes, if she does not reach the bathroom immediately soaks her underwear and has had to leave work. Notes that she was seen for evaluation and describes what sounds like urodynamic testing. Was told she should just drink less water but did not feel this was useful advice as she only drinks about 72oz of liquid a day.     Prolapse:  Do you feel a vaginal bulge? no         Pressure? no   Do you have to place your fingers in the vagina or in the rectum to have a bowel movement? No   Impact to quality of life? NA    Stress Incontinence:  Do you leak urine with cough, sneeze, exercise? yes  How often do you leak with cough, sneeze, exercise?  daily  How much do you usually leak? more  Do you wear a pad? No  If so; Na  Impact to quality of life? moderate    Urge Incontinence:  Do you often get sudden urges to urinate? yes  How often do have urges? weekly  If so, do you leak with these urges? yes  How much do you usually leak? soak  Impact to quality of life?severe     Voids/day: 10-12  Nocturia: 2  Fluid intake: 64-72oz  Caffeine: 1/day    Urinating:  Difficulty starting urination or strain to void? no  Weak or intermittent stream? no  Incomplete emptying or dribbling? yes  Pain or burning with urination? No   Any blood in your urine? No     GI:  Constipation? no  Frequency stools daily  Straining  for stools no  Stool consistency normal     Ever leak stool (Accidental Bowel Leakage)? yes      If so, how often?               occ      If so, do you leak?                  liquid      Soiling without sensation? no  History of irritable bowel or Crohn's? no    Sexual/Pain:  Are you currently having sex?. No   Pain with sex?   No   Sexual Partner: male   Do any of these symptoms interfere with sex? no  Impact to quality of life? No     Prior therapy:  Ever done pelvic floor physical therapy? No   Trial of medication? yes  Have you ever tried a pessary? No     Medical History:  Do you have?   High Cholesterol? No   Diabetes? No   High Blood pressure? No   Recurrent UTIs? No   Sleep Apnea? Yes   Other medical problems: NA    Surgical History:    Hysterectomy? No   Bladder Surgery? Urethrotomy age 6  Other? septoplasty    OB/Gyn History:  Pregnancies? 0  Deliveries? 0  Vaginal 0   Section 0   Current birth control? 0   Periods? yes  When was the first day of your last period? Patient's last menstrual period was 2018 (exact date).   Last Pap smear? 10/2018 NILM  Last mammogram? NA Last colonoscopy? NA    Medications/Vitamins/Supplements:   Flonase  Doxycycline  Singulair  Ocean spray    Drug Allergies: none   Latex Allergy: none   Iodine Allergy none   Allergies: Seasonal, animal dander (cats, dogs, rabbits)    Family History:   MFM colon cancer  PGF throat cancer  DM, HTN, CVA  Family History   Problem Relation Age of Onset     Cerebrovascular Disease Father      x2     Hypertension Father      Seizure Disorder Father      Diabetes Paternal Grandfather      Cancer Paternal Grandfather        from throat cancer. Also had melanoma.     Blood Disease Paternal Grandfather      B12 DEFICIENCY - WAS ON B12 SHOTS     Cancer Maternal Grandmother       of colon cancer     Cerebrovascular Disease Maternal Grandfather      HEART DISEASE Maternal Grandfather            Neurologic  "Disorder Sister      Epilepsy diagnosed 1988     Neurologic Disorder Sister      Epilepsy diagnosed 1998     Social History:  Marital status: single  Do you/ have you ever smoke(d)  cigarettes?  no  Drink more than 1 alcoholic beverage a day?  Social drinker  Occupation? Works on [employer] at [email or web address]    In the past 3 months have you regularly experienced:  Chest pain w/ walking/exercise? No   Unusual headaches? No   Leg pain w/ walking/exercise? No   Easy bruising? Yes  Difficulty breathing w/ walking/exercise? No   Problems with vision? No   Dizziness, falls, or fainting? No   Excessive bleeding from cuts, gums, surgery? No   Other: NA    Past medical history:  Allergies  Acne  Sleep apnea  Urinary incontinence     Past surgical history:  Urethrotomy   Septoplasty    Social history:  Patient is a non smoker. Social alcohol use. No drug use.     ROS  10 pt ROS neg other than HPI      /83  Pulse 76  Ht 1.651 m (5' 5\")  Wt 120.9 kg (266 lb 8 oz)  LMP 09/25/2018 (Exact Date)  BMI 44.35 kg/m2 Patient's last menstrual period was 09/25/2018 (exact date). Body mass index is 44.35 kg/(m^2).  Patient  is alert, comfortable in no acute distress, non-labored breathing.   Abdomen is soft, non-tender, non-distended, no CVAT.    Normal external female genitalia. The urethra was hypermobile.    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   Bimanual exam notable for uterus of normal size and contour, no adnexal masses.  3/5 kegels.    Rectal exam with normal tone, no masses or tenderness.    POPQ  Aa 0  Ba 0   C -6  D -8  GH 3  PB 3  TVL 10  Ap -2  Bp -2    + SST  VOID 50 ml  PVR 50 mL in and out cath  Urine dip ND    A/P: Steffi Hernandez is a 38 year old F with mixed urinary incontinence. In discussion with patient her stress incontinence is a daily issue while the urge " incontinence is a weekly issue, but more bothersome when it does occur.    We discussed the diagnosis and treatment options for both diagnoses, reviewing that the options for therapy are different.    1) Stress urinary incontinence: Reviewed with patient that treatment options to include:  1) observation with f/u in 6 -12 months  2) pelvic floor physical therapy  3) surgical management    We discussed her diagnosis and reviewed the [employer] On-Line Interactive POP-Q Tool about stress urinary incontinence regarding diagnosis and treatment of DANILO. Surgery would be TVT for urinary incontinence. We discussed that the risks to the procedure include but not limited to bleeding, infection, injury to adjacent organs including bowel, bladder, and blood vessels, conversion to open procedure, de irina or worsening stress incontinence or urge incontinence, need for post-operative lamas catheter, need for further procedures including for mesh extrusion or erosion and possible failure of the procedure with recurrence of her prolapse.  Patient expressed understanding and agreeable to proceed with this procedure.    2) Urge urinary incontinence: We discussed the diagnosis and treatment options. Pt has urgency and urge incontinence. Reviewed treatment options including: observation, pelvic floor physical therapy and bladder training, anti-cholinergic medications or myrbetriq, botox and Interstim. Given that she desires surgical management of her DANILO discussed that would plan to let her recover from surgery and then determine to what her urge incontinence remains an issue. If continued symptoms, patient would be amenable to a trial of pelvic physical therapy and medication.     Es Eli PGY4   10/18/2018 9:28 AM     A total of 40 minutes were spent with the patient today, > 50% in counseling and coordination of care    I attest that I was present for the history and physical and that I agree with the findings and treatment  plan outlined above.    Jorge Keenan MD  Professor, OB/GYN  Urogynecologist    CC  Patient Care Team:  Gee Cadet MD as PCP - General (Student in organized health care education/training program)  SELF, REFERRED

## 2018-10-18 NOTE — MR AVS SNAPSHOT
After Visit Summary   10/18/2018    Steffi Hernandez    MRN: [id number]           Patient Information     Date Of Birth          1980        Visit Information        Provider Department      10/18/2018 8:00 AM Jorge Keenan MD [facility]        Today's Diagnoses     DANILO (stress urinary incontinence, female)    -  1       Follow-ups after your visit        Follow-up notes from your care team     Return if symptoms worsen or fail to improve.      Your next 10 appointments already scheduled     Nov 09, 2018 10:45 AM CST   (Arrive by 10:30 AM)   Return Visit with Rebecca Koroma PA-C   [facility] ([facility] [place])    [address]  [address] [facility] [address] [phone]              Who to contact     Please call your clinic at [phone] to:    Ask questions about your health    Make or cancel appointments    Discuss your medicines    Learn about your test results    Speak to your doctor            Additional Information About Your Visit        ioGenetics[employer] Closures Information     [facility] gives you secure access to your electronic health record. If you see a primary care provider, you can also send messages to your care team and make appointments. If you have questions, please call your primary care clinic.  If you do not have a primary care provider, please call [phone] and they will assist you.      [facility] is an electronic gateway that provides easy, online access to your medical records. With [facility], you can request a clinic appointment, read your test results, renew a prescription or communicate with your care team.     To access your existing account, please contact your [facility] or call [phone] for assistance.        Care EveryWhere ID     This is your Care EveryWhere ID. This could be used by other organizations to access your [place] medical records  [id number]        Your Vitals  "Were     Pulse Height Last Period BMI (Body Mass Index)          76 1.651 m (5' 5\") 09/25/2018 (Exact Date) 44.35 kg/m2         Blood Pressure from Last 3 Encounters:   10/18/18 122/83   10/11/18 (!) 133/95   03/29/18 (!) 136/92    Weight from Last 3 Encounters:   10/18/18 120.9 kg (266 lb 8 oz)   10/11/18 121 kg (266 lb 12.8 oz)   03/29/18 121.4 kg (267 lb 11.2 oz)              We Performed the Following     Abby-Operative Worksheet (WHS)          Today's Medication Changes          These changes are accurate as of 10/18/18  9:48 AM.  If you have any questions, ask your nurse or doctor.               Stop taking these medicines if you haven't already. Please contact your care team if you have questions.     cephALEXin 500 MG capsule   Commonly known as:  KEFLEX   Stopped by:  Jorge Keenan MD                    Primary Care Provider Office Phone # Fax #    Gee Cadet [phone]       [address]  [facility] [address]        Equal Access to Services     [facility]: Hadii larissa Magaña, wajamesda jamie, qaybta kaalmada juan daniel, irene lozano . So [facility] [phone].    ATENCIÓN: Si habla español, tiene a sy disposición servicios gratuitos de asistencia lingüística. Llame al [phone].    We comply with applicable federal civil rights laws and [place] laws. We do not discriminate on the basis of race, color, national origin, age, disability, sex, sexual orientation, or gender identity.            Thank you!     Thank you for choosing [facility]  for your care. Our goal is always to provide you with excellent care. Hearing back from our patients is one way we can continue to improve our services. Please take a few minutes to complete the written survey that you may receive in the mail after your visit with us. Thank you!             Your Updated Medication List - Protect others around you: Learn how " to safely use, store and throw away your medicines at www.disposemymeds.org.          This list is accurate as of 10/18/18  9:48 AM.  Always use your most recent med list.                   Brand Name Dispense Instructions for use Diagnosis    doxycycline monohydrate 100 MG capsule     20 capsule    Take 1 capsule (100 mg) by mouth 2 times daily    Staphylococcus aureus superficial folliculitis       fluticasone 50 MCG/ACT spray    FLONASE    1 Bottle    Spray 2 sprays into both nostrils daily    Chronic non-seasonal allergic rhinitis, unspecified trigger       montelukast 10 MG tablet    SINGULAIR    30 tablet    Take 1 tablet (10 mg) by mouth At Bedtime INDICATION: TO TREAT ALLERGIC RHINITIS    Chronic non-seasonal allergic rhinitis, unspecified trigger       norgestimate-ethinyl estradiol 0.25-35 MG-MCG per tablet    ORTHO-CYCLEN, SPRINTEC    84 tablet    Take 1 tablet by mouth daily    Encounter for initial prescription of contraceptive pills       sodium chloride 0.65 % nasal spray    OCEAN    1 Bottle    Spray 2 sprays into both nostrils 2 times daily    Chronic non-seasonal allergic rhinitis, unspecified trigger

## 2018-10-19 ENCOUNTER — TELEPHONE (OUTPATIENT)
Dept: OBGYN | Facility: CLINIC | Age: 38
End: 2018-10-19

## 2018-11-01 ENCOUNTER — TELEPHONE (OUTPATIENT)
Dept: OBGYN | Facility: CLINIC | Age: 38
End: 2018-11-01

## 2018-11-06 ENCOUNTER — TELEPHONE (OUTPATIENT)
Dept: OBGYN | Facility: CLINIC | Age: 38
End: 2018-11-06

## 2018-11-13 ENCOUNTER — OFFICE VISIT (OUTPATIENT)
Dept: DERMATOLOGY | Facility: CLINIC | Age: 38
End: 2018-11-13
Payer: COMMERCIAL

## 2018-11-13 DIAGNOSIS — L70.0 ACNE VULGARIS: Primary | ICD-10-CM

## 2018-11-13 DIAGNOSIS — Z79.899 ON ISOTRETINOIN THERAPY: ICD-10-CM

## 2018-11-13 DIAGNOSIS — L73.8 BACTERIAL FOLLICULITIS: ICD-10-CM

## 2018-11-13 LAB
ALBUMIN SERPL-MCNC: 3.2 G/DL (ref 3.4–5)
ALP SERPL-CCNC: 68 U/L (ref 40–150)
ALT SERPL W P-5'-P-CCNC: 31 U/L (ref 0–50)
ANION GAP SERPL CALCULATED.3IONS-SCNC: 5 MMOL/L (ref 3–14)
AST SERPL W P-5'-P-CCNC: 29 U/L (ref 0–45)
BASOPHILS # BLD AUTO: 0 10E9/L (ref 0–0.2)
BASOPHILS NFR BLD AUTO: 0.3 %
BILIRUB SERPL-MCNC: 0.3 MG/DL (ref 0.2–1.3)
BUN SERPL-MCNC: 9 MG/DL (ref 7–30)
CALCIUM SERPL-MCNC: 8.8 MG/DL (ref 8.5–10.1)
CHLORIDE SERPL-SCNC: 108 MMOL/L (ref 94–109)
CO2 SERPL-SCNC: 26 MMOL/L (ref 20–32)
CREAT SERPL-MCNC: 0.79 MG/DL (ref 0.52–1.04)
DIFFERENTIAL METHOD BLD: NORMAL
EOSINOPHIL # BLD AUTO: 0.1 10E9/L (ref 0–0.7)
EOSINOPHIL NFR BLD AUTO: 1.5 %
ERYTHROCYTE [DISTWIDTH] IN BLOOD BY AUTOMATED COUNT: 12.2 % (ref 10–15)
GFR SERPL CREATININE-BSD FRML MDRD: 82 ML/MIN/1.7M2
GGT SERPL-CCNC: 23 U/L (ref 0–40)
GLUCOSE SERPL-MCNC: 125 MG/DL (ref 70–99)
HCG UR QL: NEGATIVE
HCT VFR BLD AUTO: 42.7 % (ref 35–47)
HGB BLD-MCNC: 13.7 G/DL (ref 11.7–15.7)
IMM GRANULOCYTES # BLD: 0 10E9/L (ref 0–0.4)
IMM GRANULOCYTES NFR BLD: 0.3 %
LYMPHOCYTES # BLD AUTO: 2.6 10E9/L (ref 0.8–5.3)
LYMPHOCYTES NFR BLD AUTO: 27.1 %
MCH RBC QN AUTO: 29.5 PG (ref 26.5–33)
MCHC RBC AUTO-ENTMCNC: 32.1 G/DL (ref 31.5–36.5)
MCV RBC AUTO: 92 FL (ref 78–100)
MONOCYTES # BLD AUTO: 0.5 10E9/L (ref 0–1.3)
MONOCYTES NFR BLD AUTO: 4.8 %
NEUTROPHILS # BLD AUTO: 6.2 10E9/L (ref 1.6–8.3)
NEUTROPHILS NFR BLD AUTO: 66 %
NRBC # BLD AUTO: 0 10*3/UL
NRBC BLD AUTO-RTO: 0 /100
PLATELET # BLD AUTO: 271 10E9/L (ref 150–450)
POTASSIUM SERPL-SCNC: 4.2 MMOL/L (ref 3.4–5.3)
PROT SERPL-MCNC: 7.5 G/DL (ref 6.8–8.8)
RBC # BLD AUTO: 4.64 10E12/L (ref 3.8–5.2)
SODIUM SERPL-SCNC: 139 MMOL/L (ref 133–144)
TRIGL SERPL-MCNC: 124 MG/DL
WBC # BLD AUTO: 9.4 10E9/L (ref 4–11)

## 2018-11-13 RX ORDER — ISOTRETINOIN 40 MG/1
80 CAPSULE ORAL 2 TIMES DAILY
Qty: 60 CAPSULE | Refills: 0 | Status: SHIPPED | OUTPATIENT
Start: 2018-11-13 | End: 2018-12-18

## 2018-11-13 ASSESSMENT — PAIN SCALES - GENERAL: PAINLEVEL: NO PAIN (0)

## 2018-11-13 NOTE — NURSING NOTE
Dermatology Rooming Note    Steffi Hernandez's goals for this visit include:   Chief Complaint   Patient presents with     Accutane     Steffi is here today to start accutane.      CHRISTOPHER Lassiter

## 2018-11-13 NOTE — PROGRESS NOTES
[facility] Dermatology Note      Dermatology Problem List:  1. Folliculitis, suspect early stage of Hidradenitis Suppurativa   Current treatment: isotretinoin 80 mg start 11/13/18  Prev treatment: Doxycycline 100mg PO BID, Keflex 500mg po BID  -Bacterial culture on the central abdomen - positive for heavy growth of staph lugdunensis  2. Acne vulgaris -isotretinoin 80 mg started 11/13/18  -Previous tx: various topical medications, numerous oral antibiotics, spironolactone, OCPs    Encounter Date: Nov 13, 2018    CC:  Chief Complaint   Patient presents with     Accutane     Steffi is here today to start accutane.          History of Present Illness:  Ms. Steffi Hernandez is a 38 year old female who presents as a follow-up for acne. The patient was last seen 10/09/18 when a bacterial culture was taken from her abdomen which revealed staph lugdunensis. She started keflex empirically, but then was switched to doxy once we had sensitivities back. She also was registered with ipledge and is here to start isotretinoin today.    Today, the patient reports that her folliculitis has improved a lot, especially on the abdomen. The patient has used all of her doxycycline 100 mg capsules and has stopped using keflex. She finished the doxy about 7 days ago. The patient denies painful, itching, tingling or bleeding lesions unless otherwise noted.       Past Medical History:   Patient Active Problem List   Diagnosis     Fatigue     Heavy menses     Memory difficulty     Allergic rhinosinusitis     CARDIOVASCULAR SCREENING; LDL GOAL LESS THAN 160     Acne     Acute maxillary sinusitis     Vitamin B12 deficiency without anemia     Vitamin D deficiency     Ascorbic acid deficiency     Iron deficiency     Moderate major depression (H)     Hirsutism     Pyridoxine deficiency     Dysmenorrhea     Family history of diabetes mellitus     Major depressive disorder, recurrent episode, moderate (HCC)     Morbid obesity,  unspecified obesity type (H)     Chronic fatigue     Menorrhagia with regular cycle     Allergic rhinitis, unspecified allergic rhinitis type     LANDRY (obstructive sleep apnea)     Morbid obesity due to excess calories (H)     Sleep apnea, obstructive     Deviated nasal septum     Past Medical History:   Diagnosis Date     Allergic rhinitis, cause unspecified      Other acne      Sleep apnea     No treatment at this time.     Unspecified urinary incontinence      Past Surgical History:   Procedure Laterality Date     C NONSPECIFIC PROCEDURE      Urology surgery for night time bed wetting at age 6.     SEPTOPLASTY, TURBINOPLASTY, COMBINED N/A 2016    Procedure: COMBINED SEPTOPLASTY, TURBINOPLASTY;  Surgeon: Baldev Perez MD;  Location:  OR       Social History:   reports that she has never smoked. She has never used smokeless tobacco. She reports that she drinks alcohol. She reports that she does not use illicit drugs.    Family History:  Family History   Problem Relation Age of Onset     Cerebrovascular Disease Father      x2     Hypertension Father      Seizure Disorder Father      Diabetes Paternal Grandfather      Cancer Paternal Grandfather        from throat cancer. Also had melanoma.     Blood Disease Paternal Grandfather      B12 DEFICIENCY - WAS ON B12 SHOTS     Cancer Maternal Grandmother       of colon cancer     Cerebrovascular Disease Maternal Grandfather      HEART DISEASE Maternal Grandfather            Neurologic Disorder Sister      Epilepsy diagnosed      Neurologic Disorder Sister      Epilepsy diagnosed        Medications:  Current Outpatient Prescriptions   Medication Sig Dispense Refill     doxycycline monohydrate 100 MG capsule Take 1 capsule (100 mg) by mouth 2 times daily 20 capsule 0     fluticasone (FLONASE) 50 MCG/ACT spray Spray 2 sprays into both nostrils daily 1 Bottle 3     montelukast (SINGULAIR) 10 MG tablet Take 1 tablet (10 mg)  by mouth At Bedtime INDICATION: TO TREAT ALLERGIC RHINITIS 30 tablet 11     sodium chloride (OCEAN) 0.65 % nasal spray Spray 2 sprays into both nostrils 2 times daily 1 Bottle 3     norgestimate-ethinyl estradiol (ORTHO-CYCLEN, SPRINTEC) 0.25-35 MG-MCG per tablet Take 1 tablet by mouth daily (Patient not taking: Reported on 10/18/2018) 84 tablet 3     [DISCONTINUED] Ferrous Sulfate (SPATONE PUR-ABSORB IRON) 5 MG/20ML LIQD Take 1 packet by mouth 2 times daily (before meals). INDICATION: TO CORRECT IRON DEFICIENCY - PLEASE TAKE WITH EMERGEN-C MIXED IN 8  8 OZ OF WATER 1 Box PRN       Allergies   Allergen Reactions     Animal Dander      Cats      Dogs      Rabbit Protein      Seasonal Allergies        Review of Systems:  -Constitutional: The patient denies fatigue, fevers, chills, unintended weight loss, and night sweats.  -Skin: As above in HPI. No additional skin concerns.  -Neuro: no HA or vision changes  -GI: No nausea, blood in stool, diarrhea, hx of IBD  -Psych: no depression/anxiety, mood changes, or sleep problems   -Musculoskeletal: no joint or muscle pain or swelling   -Heme/Lymph: no concerning bumps, no bleeding problems    Physical exam:  Vitals: There were no vitals taken for this visit.  GEN: This is a well developed, well-nourished female in no acute distress, in a pleasant mood.    SKIN: Acne exam, which includes the face, neck, upper central chest, and upper central back was performed.  -There are superifical acneiform papules with intermixed open and closed comedones on the face. Evidence of acne scarring on the cheeks primarily  -scattered erythematous papules on the abdomen, these have improved   -No other lesions of concern on areas examined.       Impression/Plan:  1. Acne Vulgaris - facial nodulocyctic with scarring  Edu on avoidance of alcohol, waxing, skin lasers, tattoos, and blood donation. Reminded patient of risks regarding pregnancy. Discussed iPledge and need to  medication  within 7 days of this visit. Advised to d/c all other acne medication.   At this visit, we will start isotretinoin 80 mg daily pending negative pregnancy test. One month supply with no refills provided. Goal dose is 150-220mg/kg for this 122.47 kg patient.    Advised patient to take with food for increased absorption.  Labs including CBC, GGT, BUN/Cr, lipids and AST/ALT will be obtained.   Qualitative hCG was also obtained  Method of contraception includes: OCP and Condoms, patient states that boyfriend has a vasectomy   Total cumulative dose 0mg/kg.   Patient's iPledge # is [phone]    We will clinically monitor this area.    2. Folliculitis (improved), suspect also early stages of Hidradenitis Suppurativa.      Patient discontinued Doxycycline 100 mg BID over 1 week ago - this vastly improved her rash.    Culture was positive or heavy growth of staph    She did discontinue the Keflex once cultures came back and does not plan to restart this at this time    She will be on isotretinoin, which is off-label for HS and may also help improve her folliculitis, but we will have to see how she does    If follicular papules continue we will entertain the dx of HS in the future      We will clinically monitor this area.    CC Dr. Billings on close of this encounter.  Follow-up in 1 month, earlier for new or changing lesions.       Staff Involved:    Scribe Disclosure  I, Zunilda Santana, am serving as a scribe to document services personally performed by Rebecca Koroma PA-C, based on data collection and the provider's statements to me.     Provider Disclosure:   The documentation recorded by the scribe accurately reflects the services I personally performed and the decisions made by me.    All risks, benefits and alternatives were discussed with patient.  Patient is in agreement and understands the assessment and plan.  All questions were answered.    Rebecca Koroma PA-C  [facility]  [facility]: Phone: [phone], Fax: [phone]

## 2018-11-13 NOTE — MR AVS SNAPSHOT
After Visit Summary   11/13/2018    Steffi Hernandez    MRN: [id number]           Patient Information     Date Of Birth          1980        Visit Information        Provider Department      11/13/2018 8:30 AM Rebecca Koroma PA-C [facility]        Today's Diagnoses     Acne vulgaris    -  1    Bacterial folliculitis        On isotretinoin therapy           Follow-ups after your visit        Follow-up notes from your care team     Return in about 4 weeks (around 12/11/2018).      Your next 10 appointments already scheduled     Dec 12, 2018  8:45 AM CST   (Arrive by 8:30 AM)   Return Visit with KATE Herring [facility] Dermatology ([facility])    [address] [address] [phone]              Who to contact     Please call your clinic at [phone] to:    Ask questions about your health    Make or cancel appointments    Discuss your medicines    Learn about your test results    Speak to your doctor            Additional Information About Your Visit        [employer] Information     [employer] gives you secure access to your electronic health record. If you see a primary care provider, you can also send messages to your care team and make appointments. If you have questions, please call your primary care clinic.  If you do not have a primary care provider, please call [phone] and they will assist you.      [employer] is an electronic gateway that provides easy, online access to your medical records. With [employer], you can request a clinic appointment, read your test results, renew a prescription or communicate with your care team.     To access your existing account, please contact your [facility] or call [phone] for assistance.        Care EveryWhere ID     This is your Care EveryWhere ID. This could be used by other organizations to access your [facility]  records  [id number]         Blood Pressure from Last 3 Encounters:   10/18/18 122/83   10/11/18 (!) 133/95   03/29/18 (!) 136/92    Weight from Last 3 Encounters:   10/18/18 120.9 kg (266 lb 8 oz)   10/11/18 121 kg (266 lb 12.8 oz)   03/29/18 121.4 kg (267 lb 11.2 oz)              We Performed the Following     HCG Qual, Urine - CSC,  All Frank[place]  ([id number])          Today's Medication Changes          These changes are accurate as of 11/13/18 10:00 AM.  If you have any questions, ask your nurse or doctor.               Start taking these medicines.        Dose/Directions    ISOtretinoin 40 MG capsule   Commonly known as:  ACCUTANE   Used for:  Acne vulgaris   Started by:  Rebecca Koroma PA-C        Dose:  80 mg   Take 2 capsules (80 mg) by mouth 2 times daily   Quantity:  60 capsule   Refills:  0            Where to get your medicines      These medications were sent to [facility] [address] [place] [address] [address] AT [facility] [address] [facility] [address]  [address], [facility] [address]     Phone:  [phone]     ISOtretinoin 40 MG capsule                Primary Care Provider Office Phone # Fax #    Gee Cadet [phone]       [address]  [facility] [address]        Equal Access to Services     RAGHU JONES AH: Hadii larissa severino hadasho Solincolnali, waaxda luqadaha, qaybta kaalmada adeegyada, irene sorenson. So [facility] [phone].    ATENCIÓN: Si habla español, tiene a sy disposición servicios gratuitos de asistencia lingüística. Llame al [phone].    We comply with applicable federal civil rights laws and [place] laws. We do not discriminate on the basis of race, color, national origin, age, disability, sex, sexual orientation, or gender identity.            Thank you!     Thank you for choosing [facility] DERMATOLOG[facility]  for your care. Our goal is always to provide you with excellent care. Hearing back from our patients  is one way we can continue to improve our services. Please take a few minutes to complete the written survey that you may receive in the mail after your visit with us. Thank you!             Your Updated Medication List - Protect others around you: Learn how to safely use, store and throw away your medicines at www.disposemymeds.org.          This list is accurate as of 11/13/18 10:00 AM.  Always use your most recent med list.                   Brand Name Dispense Instructions for use Diagnosis    fluticasone 50 MCG/ACT spray    FLONASE    1 Bottle    Spray 2 sprays into both nostrils daily    Chronic non-seasonal allergic rhinitis, unspecified trigger       ISOtretinoin 40 MG capsule    ACCUTANE    60 capsule    Take 2 capsules (80 mg) by mouth 2 times daily    Acne vulgaris       montelukast 10 MG tablet    SINGULAIR    30 tablet    Take 1 tablet (10 mg) by mouth At Bedtime INDICATION: TO TREAT ALLERGIC RHINITIS    Chronic non-seasonal allergic rhinitis, unspecified trigger       norgestimate-ethinyl estradiol 0.25-35 MG-MCG per tablet    ORTHO-CYCLEN, SPRINTEC    84 tablet    Take 1 tablet by mouth daily    Encounter for initial prescription of contraceptive pills       sodium chloride 0.65 % nasal spray    OCEAN    1 Bottle    Spray 2 sprays into both nostrils 2 times daily    Chronic non-seasonal allergic rhinitis, unspecified trigger

## 2018-11-13 NOTE — LETTER
11/13/2018       RE: Steffi Hernandez  [address]  [place] [address]     Dear Colleague,    Thank you for referring your patient, Steffi Hernandez, to the [facility] DERMATOLOGY at [facility]. Please see a copy of my visit note below.    [facility] Dermatology Note      Dermatology Problem List:  1. Folliculitis, suspect early stage of Hidradenitis Suppurativa   Current treatment: isotretinoin 80 mg start 11/13/18  Prev treatment: Doxycycline 100mg PO BID, Keflex 500mg po BID  -Bacterial culture on the central abdomen - positive for heavy growth of staph lugdunensis  2. Acne vulgaris -isotretinoin 80 mg started 11/13/18  -Previous tx: various topical medications, numerous oral antibiotics, spironolactone, OCPs    Encounter Date: Nov 13, 2018    CC:  Chief Complaint   Patient presents with     Accutane     Steffi is here today to start accutane.          History of Present Illness:  Ms. Steffi Hernandez is a 38 year old female who presents as a follow-up for acne. The patient was last seen 10/09/18 when a bacterial culture was taken from her abdomen which revealed staph lugdunensis. She started keflex empirically, but then was switched to doxy once we had sensitivities back. She also was registered with ipledge and is here to start isotretinoin today.    Today, the patient reports that her folliculitis has improved a lot, especially on the abdomen. The patient has used all of her doxycycline 100 mg capsules and has stopped using keflex. She finished the doxy about 7 days ago. The patient denies painful, itching, tingling or bleeding lesions unless otherwise noted.       Past Medical History:   Patient Active Problem List   Diagnosis     Fatigue     Heavy menses     Memory difficulty     Allergic rhinosinusitis     CARDIOVASCULAR SCREENING; LDL GOAL LESS THAN 160     Acne     Acute maxillary sinusitis     Vitamin B12 deficiency without anemia      Vitamin D deficiency     Ascorbic acid deficiency     Iron deficiency     Moderate major depression (H)     Hirsutism     Pyridoxine deficiency     Dysmenorrhea     Family history of diabetes mellitus     Major depressive disorder, recurrent episode, moderate (HCC)     Morbid obesity, unspecified obesity type (H)     Chronic fatigue     Menorrhagia with regular cycle     Allergic rhinitis, unspecified allergic rhinitis type     LANDRY (obstructive sleep apnea)     Morbid obesity due to excess calories (H)     Sleep apnea, obstructive     Deviated nasal septum     Past Medical History:   Diagnosis Date     Allergic rhinitis, cause unspecified      Other acne      Sleep apnea     No treatment at this time.     Unspecified urinary incontinence      Past Surgical History:   Procedure Laterality Date     C NONSPECIFIC PROCEDURE      Urology surgery for night time bed wetting at age 6.     SEPTOPLASTY, TURBINOPLASTY, COMBINED N/A 2016    Procedure: COMBINED SEPTOPLASTY, TURBINOPLASTY;  Surgeon: Baldev Perez MD;  Location:  OR       Social History:   reports that she has never smoked. She has never used smokeless tobacco. She reports that she drinks alcohol. She reports that she does not use illicit drugs.    Family History:  Family History   Problem Relation Age of Onset     Cerebrovascular Disease Father      x2     Hypertension Father      Seizure Disorder Father      Diabetes Paternal Grandfather      Cancer Paternal Grandfather        from throat cancer. Also had melanoma.     Blood Disease Paternal Grandfather      B12 DEFICIENCY - WAS ON B12 SHOTS     Cancer Maternal Grandmother       of colon cancer     Cerebrovascular Disease Maternal Grandfather      HEART DISEASE Maternal Grandfather            Neurologic Disorder Sister      Epilepsy diagnosed      Neurologic Disorder Sister      Epilepsy diagnosed        Medications:  Current Outpatient Prescriptions    Medication Sig Dispense Refill     doxycycline monohydrate 100 MG capsule Take 1 capsule (100 mg) by mouth 2 times daily 20 capsule 0     fluticasone (FLONASE) 50 MCG/ACT spray Spray 2 sprays into both nostrils daily 1 Bottle 3     montelukast (SINGULAIR) 10 MG tablet Take 1 tablet (10 mg) by mouth At Bedtime INDICATION: TO TREAT ALLERGIC RHINITIS 30 tablet 11     sodium chloride (OCEAN) 0.65 % nasal spray Spray 2 sprays into both nostrils 2 times daily 1 Bottle 3     norgestimate-ethinyl estradiol (ORTHO-CYCLEN, SPRINTEC) 0.25-35 MG-MCG per tablet Take 1 tablet by mouth daily (Patient not taking: Reported on 10/18/2018) 84 tablet 3     [DISCONTINUED] Ferrous Sulfate (SPATONE PUR-ABSORB IRON) 5 MG/20ML LIQD Take 1 packet by mouth 2 times daily (before meals). INDICATION: TO CORRECT IRON DEFICIENCY - PLEASE TAKE WITH EMERGEN-C MIXED IN 8  8 OZ OF WATER 1 Box PRN       Allergies   Allergen Reactions     Animal Dander      Cats      Dogs      Rabbit Protein      Seasonal Allergies        Review of Systems:  -Constitutional: The patient denies fatigue, fevers, chills, unintended weight loss, and night sweats.  -Skin: As above in HPI. No additional skin concerns.  -Neuro: no HA or vision changes  -GI: No nausea, blood in stool, diarrhea, hx of IBD  -Psych: no depression/anxiety, mood changes, or sleep problems   -Musculoskeletal: no joint or muscle pain or swelling   -Heme/Lymph: no concerning bumps, no bleeding problems    Physical exam:  Vitals: There were no vitals taken for this visit.  GEN: This is a well developed, well-nourished female in no acute distress, in a pleasant mood.    SKIN: Acne exam, which includes the face, neck, upper central chest, and upper central back was performed.  -There are superifical acneiform papules with intermixed open and closed comedones on the face. Evidence of acne scarring on the cheeks primarily  -scattered erythematous papules on the abdomen, these have improved   -No other  lesions of concern on areas examined.       Impression/Plan:  1. Acne Vulgaris - facial nodulocyctic with scarring  Edu on avoidance of alcohol, waxing, skin lasers, tattoos, and blood donation. Reminded patient of risks regarding pregnancy. Discussed iPledge and need to  medication within 7 days of this visit. Advised to d/c all other acne medication.   At this visit, we will start isotretinoin 80 mg daily pending negative pregnancy test. One month supply with no refills provided. Goal dose is 150-220mg/kg for this 122.47 kg patient.    Advised patient to take with food for increased absorption.  Labs including CBC, GGT, BUN/Cr, lipids and AST/ALT will be obtained.   Qualitative hCG was also obtained  Method of contraception includes: OCP and Condoms, patient states that boyfriend has a vasectomy   Total cumulative dose 0mg/kg.   Patient's iPledge # is [id number]    We will clinically monitor this area.    2. Folliculitis (improved), suspect also early stages of Hidradenitis Suppurativa.      Patient discontinued Doxycycline 100 mg BID over 1 week ago - this vastly improved her rash.    Culture was positive or heavy growth of staph    She did discontinue the Keflex once cultures came back and does not plan to restart this at this time    She will be on isotretinoin, which is off-label for HS and may also help improve her folliculitis, but we will have to see how she does    If follicular papules continue we will entertain the dx of HS in the future      We will clinically monitor this area.    CC Dr. Billings on close of this encounter.  Follow-up in 1 month, earlier for new or changing lesions.       Staff Involved:    Scribe Disclosure  I, Zunilda Santana, am serving as a scribe to document services personally performed by Rebecca Koroma PA-C, based on data collection and the provider's statements to me.     Provider Disclosure:   The documentation recorded by the scribe accurately reflects the services I  personally performed and the decisions made by me.    All risks, benefits and alternatives were discussed with patient.  Patient is in agreement and understands the assessment and plan.  All questions were answered.    Rebecca Koroma PA-C  [facility] [place]: Phone: [phone], Fax: [phone]

## 2018-12-14 ENCOUNTER — OFFICE VISIT (OUTPATIENT)
Dept: DERMATOLOGY | Facility: CLINIC | Age: 38
End: 2018-12-14
Payer: COMMERCIAL

## 2018-12-14 DIAGNOSIS — Z79.899 ON ISOTRETINOIN THERAPY: ICD-10-CM

## 2018-12-14 DIAGNOSIS — L70.0 ACNE VULGARIS: Primary | ICD-10-CM

## 2018-12-14 ASSESSMENT — PAIN SCALES - GENERAL: PAINLEVEL: NO PAIN (0)

## 2018-12-14 NOTE — PROGRESS NOTES
[facility] Dermatology Note      Dermatology Problem List:  1. Folliculitis, suspect early stage of Hidradenitis Suppurativa   Current treatment: isotretinoin 80 mg start 11/13/18  Prev treatment: Doxycycline 100mg PO BID, Keflex 500mg po BID  -Bacterial culture on the central abdomen - positive for heavy growth of staph lugdunensis  2. Acne vulgaris - isotretinoin 80 mg started 11/13/18, end of month #1   -Previous tx: various topical medications, numerous oral antibiotics, spironolactone, OCPs    Encounter Date: Dec 14, 2018    CC:  Chief Complaint   Patient presents with     Derm Problem     Accutane, Steffi notes her acne is getting better.          History of Present Illness:  Ms. Steffi Hernandez is a 38 year old female who presents as a follow-up for acne. The patient was last seen 11/13/18 when she began isotretinoin 80 mg daily. At today's visit, she reports that she has felt more emotional during this last month. She has history of depression, but has not been taking medication for this for years. She experienced a few days it was harder to get out of bed. She also found herself crying more easily as well. She also reports significant dryness. Despite these symptoms, she is overall tolerating the medicine well and would look to continue. The patient reports tolerable mucocutaneous dryness, and denies arthralgias, myalgias, depression, suicidal ideation, diarrhea, headache, or blurred vision.        Past Medical History:   Patient Active Problem List   Diagnosis     Fatigue     Heavy menses     Memory difficulty     Allergic rhinosinusitis     CARDIOVASCULAR SCREENING; LDL GOAL LESS THAN 160     Acne     Acute maxillary sinusitis     Vitamin B12 deficiency without anemia     Vitamin D deficiency     Ascorbic acid deficiency     Iron deficiency     Moderate major depression (H)     Hirsutism     Pyridoxine deficiency     Dysmenorrhea     Family history of diabetes mellitus     Major  depressive disorder, recurrent episode, moderate (HCC)     Morbid obesity, unspecified obesity type (H)     Chronic fatigue     Menorrhagia with regular cycle     Allergic rhinitis, unspecified allergic rhinitis type     LANDRY (obstructive sleep apnea)     Morbid obesity due to excess calories (H)     Sleep apnea, obstructive     Deviated nasal septum     Past Medical History:   Diagnosis Date     Allergic rhinitis, cause unspecified      Other acne      Sleep apnea     No treatment at this time.     Unspecified urinary incontinence      Past Surgical History:   Procedure Laterality Date     C NONSPECIFIC PROCEDURE      Urology surgery for night time bed wetting at age 6.     SEPTOPLASTY, TURBINOPLASTY, COMBINED N/A 2016    Procedure: COMBINED SEPTOPLASTY, TURBINOPLASTY;  Surgeon: Baldev Perez MD;  Location:  OR       Social History:   reports that  has never smoked. she has never used smokeless tobacco. She reports that she drinks alcohol. She reports that she does not use drugs.    Family History:  Family History   Problem Relation Age of Onset     Cerebrovascular Disease Father         x2     Hypertension Father      Seizure Disorder Father      Diabetes Paternal Grandfather      Cancer Paternal Grandfather           from throat cancer. Also had melanoma.     Blood Disease Paternal Grandfather         B12 DEFICIENCY - WAS ON B12 SHOTS     Cancer Maternal Grandmother          of colon cancer     Cerebrovascular Disease Maternal Grandfather      Heart Disease Maternal Grandfather               Neurologic Disorder Sister         Epilepsy diagnosed      Neurologic Disorder Sister         Epilepsy diagnosed        Medications:  Current Outpatient Medications   Medication Sig Dispense Refill     fluticasone (FLONASE) 50 MCG/ACT spray Spray 2 sprays into both nostrils daily 1 Bottle 3     ISOtretinoin (ACCUTANE) 40 MG capsule Take 2 capsules (80 mg) by mouth 2 times  daily 60 capsule 0     montelukast (SINGULAIR) 10 MG tablet Take 1 tablet (10 mg) by mouth At Bedtime INDICATION: TO TREAT ALLERGIC RHINITIS 30 tablet 11     norgestimate-ethinyl estradiol (ORTHO-CYCLEN, SPRINTEC) 0.25-35 MG-MCG per tablet Take 1 tablet by mouth daily (Patient not taking: Reported on 10/18/2018) 84 tablet 3     sodium chloride (OCEAN) 0.65 % nasal spray Spray 2 sprays into both nostrils 2 times daily 1 Bottle 3       Allergies   Allergen Reactions     Animal Dander      Cats      Dogs      Rabbit Protein      Seasonal Allergies        Review of Systems:  -Constitutional: The patient denies fatigue, fevers, chills, unintended weight loss, and night sweats.  -Skin: As above in HPI. No additional skin concerns.  -Neuro: no HA or vision changes  -GI: No nausea, blood in stool, diarrhea, hx of IBD  -Psych: no depression/anxiety, mood changes, or sleep problems   -Musculoskeletal: no joint or muscle pain or swelling   -Heme/Lymph: no concerning bumps, no bleeding problems    Physical exam:  Vitals: There were no vitals taken for this visit.  GEN: This is a well developed, well-nourished female in no acute distress, in a pleasant mood.    SKIN: Acne exam, which includes the face and neck was performed.  -Superifical acneiform papules with intermixed open and closed comedones on the face. Evidence of acne scarring on the cheeks primarily  -scattered erythematous papules on the abdomen, these have improved   -No other lesions of concern on areas examined.       Impression/Plan:  1. Acne Vulgaris - facial nodulocyctic with scarring  Edu on avoidance of alcohol, waxing, skin lasers, tattoos, and blood donation. Reminded patient of risks regarding pregnancy. Discussed iPledge and need to  medication within 7 days of this visit. Advised to d/c all other acne medication.   At this visit, we will continue isotretinoin 80 mg daily pending negative pregnancy test. One month supply with no refills provided.  Goal dose is 150-220mg/kg for this 122.47 kg patient.    Advised patient to take with food for increased absorption.  Labs including CBC, CMP, lipids will be obtained.   Her triglycerides were elevated , 268. Will hold 80 mg dose and start a fish oil supplement. Will consider increasing to 120 mg at next visit if labs improve.   Qualitative hCG was also obtained  Method of contraception includes: OCP and Condoms, patient states that boyfriend has a vasectomy   Total cumulative dose 2400mg (19.6 mg/kg).   Patient's iPledge # is [id number]    We will clinically monitor this area.      CC Dr. Billings on close of this encounter.  Follow-up in 1 month, earlier for new or changing lesions.       Staff Involved:  Staff Only    Scribe Disclosure:  I, Zeeshan Freire, am serving as a scribe to document services personally performed by Judy Brown PA-C, based on data collection and the provider's statements to me.     Provider Disclosure:   The documentation recorded by the scribe accurately reflects the services I personally performed and the decisions made by me.    All risks, benefits and alternatives were discussed with patient.  Patient is in agreement and understands the assessment and plan.  All questions were answered.  Sun Screen Education was given.   Return to Clinic in 1 month or sooner as needed.   Judy Brown PA-C   [facility]

## 2018-12-14 NOTE — LETTER
12/14/2018       RE: Steffi Hernandez  [address]  [place] [address]     Dear Colleague,    Thank you for referring your patient, Steffi Hernandez, to the [facility] DERMATOLOGY at [facility]. Please see a copy of my visit note below.    [facility] Dermatology Note      Dermatology Problem List:  1. Folliculitis, suspect early stage of Hidradenitis Suppurativa   Current treatment: isotretinoin 80 mg start 11/13/18  Prev treatment: Doxycycline 100mg PO BID, Keflex 500mg po BID  -Bacterial culture on the central abdomen - positive for heavy growth of staph lugdunensis  2. Acne vulgaris - isotretinoin 80 mg started 11/13/18, end of month #1   -Previous tx: various topical medications, numerous oral antibiotics, spironolactone, OCPs    Encounter Date: Dec 14, 2018    CC:  Chief Complaint   Patient presents with     Derm Problem     Accutane, Steffi notes her acne is getting better.          History of Present Illness:  Ms. Steffi Hernandez is a 38 year old female who presents as a follow-up for acne. The patient was last seen 11/13/18 when she began isotretinoin 80 mg daily. At today's visit, she reports that she has felt more emotional during this last month. She has history of depression, but has not been taking medication for this for years. She experienced a few days it was harder to get out of bed. She also found herself crying more easily as well. She also reports significant dryness. Despite these symptoms, she is overall tolerating the medicine well and would look to continue. The patient reports tolerable mucocutaneous dryness, and denies arthralgias, myalgias, depression, suicidal ideation, diarrhea, headache, or blurred vision.        Past Medical History:   Patient Active Problem List   Diagnosis     Fatigue     Heavy menses     Memory difficulty     Allergic rhinosinusitis     CARDIOVASCULAR SCREENING; LDL GOAL LESS THAN 160      Acne     Acute maxillary sinusitis     Vitamin B12 deficiency without anemia     Vitamin D deficiency     Ascorbic acid deficiency     Iron deficiency     Moderate major depression (H)     Hirsutism     Pyridoxine deficiency     Dysmenorrhea     Family history of diabetes mellitus     Major depressive disorder, recurrent episode, moderate (HCC)     Morbid obesity, unspecified obesity type (H)     Chronic fatigue     Menorrhagia with regular cycle     Allergic rhinitis, unspecified allergic rhinitis type     LANDRY (obstructive sleep apnea)     Morbid obesity due to excess calories (H)     Sleep apnea, obstructive     Deviated nasal septum     Past Medical History:   Diagnosis Date     Allergic rhinitis, cause unspecified      Other acne      Sleep apnea     No treatment at this time.     Unspecified urinary incontinence      Past Surgical History:   Procedure Laterality Date     C NONSPECIFIC PROCEDURE      Urology surgery for night time bed wetting at age 6.     SEPTOPLASTY, TURBINOPLASTY, COMBINED N/A 2016    Procedure: COMBINED SEPTOPLASTY, TURBINOPLASTY;  Surgeon: Baldev Perez MD;  Location:  OR       Social History:   reports that  has never smoked. she has never used smokeless tobacco. She reports that she drinks alcohol. She reports that she does not use drugs.    Family History:  Family History   Problem Relation Age of Onset     Cerebrovascular Disease Father         x2     Hypertension Father      Seizure Disorder Father      Diabetes Paternal Grandfather      Cancer Paternal Grandfather           from throat cancer. Also had melanoma.     Blood Disease Paternal Grandfather         B12 DEFICIENCY - WAS ON B12 SHOTS     Cancer Maternal Grandmother          of colon cancer     Cerebrovascular Disease Maternal Grandfather      Heart Disease Maternal Grandfather               Neurologic Disorder Sister         Epilepsy diagnosed      Neurologic Disorder Sister          Epilepsy diagnosed 1998       Medications:  Current Outpatient Medications   Medication Sig Dispense Refill     fluticasone (FLONASE) 50 MCG/ACT spray Spray 2 sprays into both nostrils daily 1 Bottle 3     ISOtretinoin (ACCUTANE) 40 MG capsule Take 2 capsules (80 mg) by mouth 2 times daily 60 capsule 0     montelukast (SINGULAIR) 10 MG tablet Take 1 tablet (10 mg) by mouth At Bedtime INDICATION: TO TREAT ALLERGIC RHINITIS 30 tablet 11     norgestimate-ethinyl estradiol (ORTHO-CYCLEN, SPRINTEC) 0.25-35 MG-MCG per tablet Take 1 tablet by mouth daily (Patient not taking: Reported on 10/18/2018) 84 tablet 3     sodium chloride (OCEAN) 0.65 % nasal spray Spray 2 sprays into both nostrils 2 times daily 1 Bottle 3       Allergies   Allergen Reactions     Animal Dander      Cats      Dogs      Rabbit Protein      Seasonal Allergies        Review of Systems:  -Constitutional: The patient denies fatigue, fevers, chills, unintended weight loss, and night sweats.  -Skin: As above in HPI. No additional skin concerns.  -Neuro: no HA or vision changes  -GI: No nausea, blood in stool, diarrhea, hx of IBD  -Psych: no depression/anxiety, mood changes, or sleep problems   -Musculoskeletal: no joint or muscle pain or swelling   -Heme/Lymph: no concerning bumps, no bleeding problems    Physical exam:  Vitals: There were no vitals taken for this visit.  GEN: This is a well developed, well-nourished female in no acute distress, in a pleasant mood.    SKIN: Acne exam, which includes the face and neck was performed.  -Superifical acneiform papules with intermixed open and closed comedones on the face. Evidence of acne scarring on the cheeks primarily  -scattered erythematous papules on the abdomen, these have improved   -No other lesions of concern on areas examined.       Impression/Plan:  1. Acne Vulgaris - facial nodulocyctic with scarring  Edu on avoidance of alcohol, waxing, skin lasers, tattoos, and blood donation. Reminded  patient of risks regarding pregnancy. Discussed iPledge and need to  medication within 7 days of this visit. Advised to d/c all other acne medication.   At this visit, we will continue isotretinoin 80 mg daily pending negative pregnancy test. One month supply with no refills provided. Goal dose is 150-220mg/kg for this 122.47 kg patient.    Advised patient to take with food for increased absorption.  Labs including CBC, CMP, lipids will be obtained.   Her triglycerides were elevated , 268. Will hold 80 mg dose and start a fish oil supplement. Will consider increasing to 120 mg at next visit if labs improve.   Qualitative hCG was also obtained  Method of contraception includes: OCP and Condoms, patient states that boyfriend has a vasectomy   Total cumulative dose 2400mg (19.6 mg/kg).   Patient's iPledge # is [id number]    We will clinically monitor this area.      CC Dr. Billings on close of this encounter.  Follow-up in 1 month, earlier for new or changing lesions.       Staff Involved:  Staff Only    Scribe Disclosure:  I, Zeeshan Freire, am serving as a scribe to document services personally performed by Judy Brown PA-C, based on data collection and the provider's statements to me.     Provider Disclosure:   The documentation recorded by the scribe accurately reflects the services I personally performed and the decisions made by me.    All risks, benefits and alternatives were discussed with patient.  Patient is in agreement and understands the assessment and plan.  All questions were answered.  Sun Screen Education was given.   Return to Clinic in 1 month or sooner as needed.       Judy Brown PA-C   [facility] Dermat[facility]

## 2018-12-18 DIAGNOSIS — Z79.899 ON ISOTRETINOIN THERAPY: ICD-10-CM

## 2018-12-18 DIAGNOSIS — L70.0 ACNE VULGARIS: ICD-10-CM

## 2018-12-18 LAB
ALBUMIN SERPL-MCNC: 3.3 G/DL (ref 3.4–5)
ALP SERPL-CCNC: 75 U/L (ref 40–150)
ALT SERPL W P-5'-P-CCNC: 77 U/L (ref 0–50)
ANION GAP SERPL CALCULATED.3IONS-SCNC: 9 MMOL/L (ref 3–14)
AST SERPL W P-5'-P-CCNC: 45 U/L (ref 0–45)
BASOPHILS # BLD AUTO: 0 10E9/L (ref 0–0.2)
BASOPHILS NFR BLD AUTO: 0.5 %
BILIRUB SERPL-MCNC: 0.5 MG/DL (ref 0.2–1.3)
BUN SERPL-MCNC: 10 MG/DL (ref 7–30)
CALCIUM SERPL-MCNC: 8.4 MG/DL (ref 8.5–10.1)
CHLORIDE SERPL-SCNC: 104 MMOL/L (ref 94–109)
CHOLEST SERPL-MCNC: 231 MG/DL
CO2 SERPL-SCNC: 23 MMOL/L (ref 20–32)
CREAT SERPL-MCNC: 0.68 MG/DL (ref 0.52–1.04)
DIFFERENTIAL METHOD BLD: NORMAL
EOSINOPHIL # BLD AUTO: 0.1 10E9/L (ref 0–0.7)
EOSINOPHIL NFR BLD AUTO: 1.7 %
ERYTHROCYTE [DISTWIDTH] IN BLOOD BY AUTOMATED COUNT: 12.1 % (ref 10–15)
GFR SERPL CREATININE-BSD FRML MDRD: >90 ML/MIN/1.7M2
GLUCOSE SERPL-MCNC: 110 MG/DL (ref 70–99)
HCG UR QL: NEGATIVE
HCT VFR BLD AUTO: 43.2 % (ref 35–47)
HDLC SERPL-MCNC: 48 MG/DL
HGB BLD-MCNC: 13.6 G/DL (ref 11.7–15.7)
IMM GRANULOCYTES # BLD: 0 10E9/L (ref 0–0.4)
IMM GRANULOCYTES NFR BLD: 0.2 %
LDLC SERPL CALC-MCNC: 129 MG/DL
LYMPHOCYTES # BLD AUTO: 2.7 10E9/L (ref 0.8–5.3)
LYMPHOCYTES NFR BLD AUTO: 43.4 %
MCH RBC QN AUTO: 28.1 PG (ref 26.5–33)
MCHC RBC AUTO-ENTMCNC: 31.5 G/DL (ref 31.5–36.5)
MCV RBC AUTO: 89 FL (ref 78–100)
MONOCYTES # BLD AUTO: 0.4 10E9/L (ref 0–1.3)
MONOCYTES NFR BLD AUTO: 7 %
NEUTROPHILS # BLD AUTO: 3 10E9/L (ref 1.6–8.3)
NEUTROPHILS NFR BLD AUTO: 47.2 %
NONHDLC SERPL-MCNC: 183 MG/DL
NRBC # BLD AUTO: 0 10*3/UL
NRBC BLD AUTO-RTO: 0 /100
PLATELET # BLD AUTO: 315 10E9/L (ref 150–450)
POTASSIUM SERPL-SCNC: 4 MMOL/L (ref 3.4–5.3)
PROT SERPL-MCNC: 7.6 G/DL (ref 6.8–8.8)
RBC # BLD AUTO: 4.84 10E12/L (ref 3.8–5.2)
SODIUM SERPL-SCNC: 135 MMOL/L (ref 133–144)
TRIGL SERPL-MCNC: 268 MG/DL
WBC # BLD AUTO: 6.3 10E9/L (ref 4–11)

## 2018-12-18 RX ORDER — ISOTRETINOIN 40 MG/1
80 CAPSULE ORAL 2 TIMES DAILY
Qty: 60 CAPSULE | Refills: 0 | Status: SHIPPED | OUTPATIENT
Start: 2018-12-18 | End: 2019-01-17

## 2019-01-17 ENCOUNTER — OFFICE VISIT (OUTPATIENT)
Dept: DERMATOLOGY | Facility: CLINIC | Age: 39
End: 2019-01-17
Payer: COMMERCIAL

## 2019-01-17 DIAGNOSIS — L70.0 ACNE VULGARIS: ICD-10-CM

## 2019-01-17 DIAGNOSIS — Z79.899 ON ISOTRETINOIN THERAPY: ICD-10-CM

## 2019-01-17 DIAGNOSIS — Z79.899 ENCOUNTER FOR LONG-TERM (CURRENT) USE OF HIGH-RISK MEDICATION: ICD-10-CM

## 2019-01-17 DIAGNOSIS — Z79.899 ENCOUNTER FOR LONG-TERM (CURRENT) USE OF HIGH-RISK MEDICATION: Primary | ICD-10-CM

## 2019-01-17 LAB — HCG UR QL: NEGATIVE

## 2019-01-17 RX ORDER — ISOTRETINOIN 40 MG/1
80 CAPSULE ORAL DAILY
Qty: 60 CAPSULE | Refills: 0 | Status: SHIPPED | OUTPATIENT
Start: 2019-01-17 | End: 2019-07-09

## 2019-01-17 ASSESSMENT — PAIN SCALES - GENERAL: PAINLEVEL: NO PAIN (0)

## 2019-01-17 NOTE — NURSING NOTE
Dermatology Rooming Note    Steffi Hernandez's goals for this visit include:   Chief Complaint   Patient presents with     Derm Problem     Acne. Accutane. Notes improvement, no side effects of concern.     Misa Hernandez, CMA

## 2019-01-17 NOTE — PROGRESS NOTES
CHIEF COMPLAINT:  Follow up on isotretinoin.      SUBJECTIVE:  Steffi is a very pleasant 38-year-old female with a history of severe papulopustular acne with scarring, who is here for followup today on oral isotretinoin therapy.  She started oral isotretinoin on 11/13/2018 at 80 mg and has continued that dose to date; her total dose thus far is 7200 mg (her goal dose based on a 220 mg/kg target and a weight of approximately 122 kilograms is 27,000 mg).  Today Steffi reports that she is seeing some improvement with isotretinoin and is having decreased flares of her acne and is tolerating this medication relatively well.  She has notable dry skin, dry lips and dry eyes but is tolerating this with moisturizers.  She denies any new areas of involvement.      REVIEW OF SYSTEMS:  Dry skin and dry lips as noted above.  Mild headache this morning which to her seems like her usual migraine symptoms.  Occasional nosebleeds while on isotretinoin.  Otherwise, a 10-point review of systems is negative.      PHYSICAL EXAMINATION:   GENERAL:  In general, this is a well-appearing, well-nourished female, with a normal mood and affect who is oriented x3.   SKIN:  A cutaneous exam of the head, neck and bilateral upper extremities was performed and demonstrates scattered erythematous 2-4 mm papules on the bilateral cheeks, as well as occasional rare ice pick type scars.      ASSESSMENT AND PLAN:  Acne vulgaris, papulopustular with some scarring.  Today's plan is as follows:   1.  We refilled her prescription for isotretinoin 80 mg to be taken once daily, pending a negative pregnancy test.  Once this returns as negative, we will verify her in iPledge and this medication will be released.  I reviewed with her that the existing data suggests that 220 mg/kg is the best target dose, given that the cure rate from this dosing is between 75% and 80% for most patients.   2.  We reviewed the usual risks and benefits of tretinoin including the  absolute contraindication to pregnancy and potential side effects including pseudotumor cerebri, as well as other potential effects.  I also reminded her not to drink to excess, not to share the medication with others or to donate blood while she is taking it.   3.  She will follow up in our clinic in 1 month's time.     Ken Parisi MD  Dermatology Attending

## 2019-01-17 NOTE — LETTER
1/17/2019       RE: Steffi Hernandez  [address]  [place] [address]     Dear Colleague,    Thank you for referring your patient, Steffi Hernandez, to the [facility] DERMATOLOGY at [facility]. Please see a copy of my visit note below.    CHIEF COMPLAINT:  Follow up on isotretinoin.      SUBJECTIVE:  Steffi is a very pleasant 38-year-old female with a history of severe papulopustular acne with scarring, who is here for followup today on oral isotretinoin therapy.  She started oral isotretinoin on 11/13/2018 at 80 mg and has continued that dose to date; her total dose thus far is 7200 mg (her goal dose based on a 220 mg/kg target and a weight of approximately 122 kilograms is 27,000 mg).  Today Steffi reports that she is seeing some improvement with isotretinoin and is having decreased flares of her acne and is tolerating this medication relatively well.  She has notable dry skin, dry lips and dry eyes but is tolerating this with moisturizers.  She denies any new areas of involvement.      REVIEW OF SYSTEMS:  Dry skin and dry lips as noted above.  Mild headache this morning which to her seems like her usual migraine symptoms.  Occasional nosebleeds while on isotretinoin.  Otherwise, a 10-point review of systems is negative.      PHYSICAL EXAMINATION:   GENERAL:  In general, this is a well-appearing, well-nourished female, with a normal mood and affect who is oriented x3.   SKIN:  A cutaneous exam of the head, neck and bilateral upper extremities was performed and demonstrates scattered erythematous 2-4 mm papules on the bilateral cheeks, as well as occasional rare ice pick type scars.      ASSESSMENT AND PLAN:  Acne vulgaris, papulopustular with some scarring.  Today's plan is as follows:   1.  We refilled her prescription for isotretinoin 80 mg to be taken once daily, pending a negative pregnancy test.  Once this returns as negative, we will verify her in iPledge  and this medication will be released.  I reviewed with her that the existing data suggests that 220 mg/kg is the best target dose, given that the cure rate from this dosing is between 75% and 80% for most patients.   2.  We reviewed the usual risks and benefits of tretinoin including the absolute contraindication to pregnancy and potential side effects including pseudotumor cerebri, as well as other potential effects.  I also reminded her not to drink to excess, not to share the medication with others or to donate blood while she is taking it.   3.  She will follow up in our clinic in 1 month's time.       Ken Parisi MD  Dermatology Attending

## 2019-01-27 PROBLEM — Z79.899 ENCOUNTER FOR LONG-TERM (CURRENT) USE OF HIGH-RISK MEDICATION: Status: ACTIVE | Noted: 2019-01-27

## 2019-01-31 ENCOUNTER — DOCUMENTATION ONLY (OUTPATIENT)
Dept: CARE COORDINATION | Facility: CLINIC | Age: 39
End: 2019-01-31

## 2019-02-15 ENCOUNTER — OFFICE VISIT (OUTPATIENT)
Dept: DERMATOLOGY | Facility: CLINIC | Age: 39
End: 2019-02-15
Payer: COMMERCIAL

## 2019-02-15 DIAGNOSIS — L70.0 ACNE VULGARIS: ICD-10-CM

## 2019-02-15 DIAGNOSIS — Z79.899 ON ISOTRETINOIN THERAPY: ICD-10-CM

## 2019-02-15 DIAGNOSIS — Z79.899 ON ISOTRETINOIN THERAPY: Primary | ICD-10-CM

## 2019-02-15 DIAGNOSIS — L30.8 RETINOID DERMATITIS: ICD-10-CM

## 2019-02-15 LAB
ALBUMIN SERPL-MCNC: 3.4 G/DL (ref 3.4–5)
ALP SERPL-CCNC: 81 U/L (ref 40–150)
ALT SERPL W P-5'-P-CCNC: 86 U/L (ref 0–50)
ANION GAP SERPL CALCULATED.3IONS-SCNC: 8 MMOL/L (ref 3–14)
AST SERPL W P-5'-P-CCNC: 93 U/L (ref 0–45)
BASOPHILS # BLD AUTO: 0 10E9/L (ref 0–0.2)
BASOPHILS NFR BLD AUTO: 0.4 %
BILIRUB SERPL-MCNC: 0.3 MG/DL (ref 0.2–1.3)
BUN SERPL-MCNC: 11 MG/DL (ref 7–30)
CALCIUM SERPL-MCNC: 8.6 MG/DL (ref 8.5–10.1)
CHLORIDE SERPL-SCNC: 103 MMOL/L (ref 94–109)
CO2 SERPL-SCNC: 26 MMOL/L (ref 20–32)
CREAT SERPL-MCNC: 0.74 MG/DL (ref 0.52–1.04)
DIFFERENTIAL METHOD BLD: ABNORMAL
EOSINOPHIL # BLD AUTO: 0.2 10E9/L (ref 0–0.7)
EOSINOPHIL NFR BLD AUTO: 2.1 %
ERYTHROCYTE [DISTWIDTH] IN BLOOD BY AUTOMATED COUNT: 12.8 % (ref 10–15)
GFR SERPL CREATININE-BSD FRML MDRD: >90 ML/MIN/{1.73_M2}
GLUCOSE SERPL-MCNC: 109 MG/DL (ref 70–99)
HCG UR QL: NEGATIVE
HCT VFR BLD AUTO: 43.8 % (ref 35–47)
HGB BLD-MCNC: 13.5 G/DL (ref 11.7–15.7)
IMM GRANULOCYTES # BLD: 0 10E9/L (ref 0–0.4)
IMM GRANULOCYTES NFR BLD: 0.1 %
LYMPHOCYTES # BLD AUTO: 2.9 10E9/L (ref 0.8–5.3)
LYMPHOCYTES NFR BLD AUTO: 38.6 %
MCH RBC QN AUTO: 27.7 PG (ref 26.5–33)
MCHC RBC AUTO-ENTMCNC: 30.8 G/DL (ref 31.5–36.5)
MCV RBC AUTO: 90 FL (ref 78–100)
MONOCYTES # BLD AUTO: 0.5 10E9/L (ref 0–1.3)
MONOCYTES NFR BLD AUTO: 6.6 %
NEUTROPHILS # BLD AUTO: 3.9 10E9/L (ref 1.6–8.3)
NEUTROPHILS NFR BLD AUTO: 52.2 %
NRBC # BLD AUTO: 0 10*3/UL
NRBC BLD AUTO-RTO: 0 /100
PLATELET # BLD AUTO: 267 10E9/L (ref 150–450)
POTASSIUM SERPL-SCNC: 3.9 MMOL/L (ref 3.4–5.3)
PROT SERPL-MCNC: 7.8 G/DL (ref 6.8–8.8)
RBC # BLD AUTO: 4.88 10E12/L (ref 3.8–5.2)
SODIUM SERPL-SCNC: 137 MMOL/L (ref 133–144)
TRIGL SERPL-MCNC: 162 MG/DL
WBC # BLD AUTO: 7.5 10E9/L (ref 4–11)

## 2019-02-15 RX ORDER — TRIAMCINOLONE ACETONIDE 1 MG/G
OINTMENT TOPICAL 2 TIMES DAILY
Qty: 80 G | Refills: 1 | Status: SHIPPED | OUTPATIENT
Start: 2019-02-15 | End: 2019-07-09

## 2019-02-15 RX ORDER — ISOTRETINOIN 40 MG/1
40 CAPSULE ORAL 2 TIMES DAILY
Qty: 60 CAPSULE | Refills: 0 | Status: SHIPPED | OUTPATIENT
Start: 2019-02-15 | End: 2019-03-19

## 2019-02-15 ASSESSMENT — PAIN SCALES - GENERAL: PAINLEVEL: NO PAIN (0)

## 2019-02-15 NOTE — PROGRESS NOTES
[facility] Dermatology Note      Dermatology Problem List:  1. Folliculitis, suspect early stage of Hidradenitis Suppurativa   Current treatment: isotretinoin 80 mg start 11/13/18  Prev treatment: Doxycycline 100mg PO BID, Keflex 500mg po BID  -Bacterial culture on the central abdomen - positive for heavy growth of staph lugdunensis  2. Acne vulgaris - isotretinoin 80 mg started 11/13/18, end of month #3.  -Previous tx: various topical medications, numerous oral antibiotics, spironolactone, OCPs  3. Retinoid dermatitis  - triamcinolone 0.1% ointment     Encounter Date: Feb 15, 2019    CC:  Chief Complaint   Patient presents with     Derm Problem     Steffi Leone states her facial acne is improving but still has acne on other parts of her body.     History of Present Illness:  Ms. Steffi Hernandez is a 38 year old female who presents as a follow-up for acne. The patient was last seen in the dermatology clinic on  01/17/19 by Dr. Parisi during which she continued isotretinoin 80 mg daily. Today she is at the end of month # 3. She reports that her acne is still present, but improving. She notes open sores on her stomach. She has been scratching at this area, as it has been itchy. She was unsure if the lesions were originally pimples. She has been trying to keep up with use of emollients, but has found certain areas are difficult to moisturize. Her eyelids have been increasingly flaky.     The patient reports tolerable mucocutaneous dryness, and denies arthralgias, myalgias, depression, suicidal ideation, diarrhea, headache, or blurred vision.        Past Medical History:   Patient Active Problem List   Diagnosis     Fatigue     Heavy menses     Memory difficulty     Allergic rhinosinusitis     CARDIOVASCULAR SCREENING; LDL GOAL LESS THAN 160     Acne     Acute maxillary sinusitis     Vitamin B12 deficiency without anemia     Vitamin D deficiency     Ascorbic acid deficiency     Iron deficiency      Moderate major depression (H)     Hirsutism     Pyridoxine deficiency     Dysmenorrhea     Family history of diabetes mellitus     Major depressive disorder, recurrent episode, moderate (HCC)     Morbid obesity, unspecified obesity type (H)     Chronic fatigue     Menorrhagia with regular cycle     Allergic rhinitis, unspecified allergic rhinitis type     LANDRY (obstructive sleep apnea)     Morbid obesity due to excess calories (H)     Sleep apnea, obstructive     Deviated nasal septum     Encounter for long-term (current) use of high-risk medication     Past Medical History:   Diagnosis Date     Allergic rhinitis, cause unspecified      Other acne      Sleep apnea     No treatment at this time.     Unspecified urinary incontinence      Past Surgical History:   Procedure Laterality Date     C NONSPECIFIC PROCEDURE      Urology surgery for night time bed wetting at age 6.     SEPTOPLASTY, TURBINOPLASTY, COMBINED N/A 2016    Procedure: COMBINED SEPTOPLASTY, TURBINOPLASTY;  Surgeon: Baldev Perez MD;  Location:  OR       Social History:   reports that  has never smoked. she has never used smokeless tobacco. She reports that she drinks alcohol. She reports that she does not use drugs.    Family History:  Family History   Problem Relation Age of Onset     Cerebrovascular Disease Father         x2     Hypertension Father      Seizure Disorder Father      Diabetes Paternal Grandfather      Cancer Paternal Grandfather           from throat cancer. Also had melanoma.     Blood Disease Paternal Grandfather         B12 DEFICIENCY - WAS ON B12 SHOTS     Cancer Maternal Grandmother          of colon cancer     Cerebrovascular Disease Maternal Grandfather      Heart Disease Maternal Grandfather               Neurologic Disorder Sister         Epilepsy diagnosed      Neurologic Disorder Sister         Epilepsy diagnosed      Melanoma No family hx of      Skin Cancer No family  hx of        Medications:  Current Outpatient Medications   Medication Sig Dispense Refill     triamcinolone (KENALOG) 0.1 % external ointment Apply topically 2 times daily To rash on abdomen x 2 weeks. 80 g 1     fluticasone (FLONASE) 50 MCG/ACT spray Spray 2 sprays into both nostrils daily 1 Bottle 3     ISOtretinoin (ACCUTANE) 40 MG capsule Take 2 capsules (80 mg) by mouth daily 60 capsule 0     montelukast (SINGULAIR) 10 MG tablet Take 1 tablet (10 mg) by mouth At Bedtime INDICATION: TO TREAT ALLERGIC RHINITIS 30 tablet 11     norgestimate-ethinyl estradiol (ORTHO-CYCLEN, SPRINTEC) 0.25-35 MG-MCG per tablet Take 1 tablet by mouth daily 84 tablet 3     sodium chloride (OCEAN) 0.65 % nasal spray Spray 2 sprays into both nostrils 2 times daily 1 Bottle 3       Allergies   Allergen Reactions     Animal Dander      Cats      Dogs      Rabbit Protein      Seasonal Allergies        Review of Systems:  -Constitutional: The patient denies fatigue, fevers, chills, unintended weight loss, and night sweats.  -Skin: As above in HPI. No additional skin concerns.  -Neuro: no HA or vision changes  -GI: No nausea, blood in stool, diarrhea, hx of IBD  -Psych: no depression/anxiety, mood changes, or sleep problems   -Musculoskeletal: no joint or muscle pain or swelling   -Heme/Lymph: no concerning bumps, no bleeding problems    Physical exam:  Vitals: There were no vitals taken for this visit.  GEN: This is a well developed, well-nourished female in no acute distress, in a pleasant mood.    SKIN: Acne exam, which includes the face and neck was performed.  - Few closed comedones to cheeks and forehead  - Pink excoriated thin papules and linear excorations to central abdomen, bilaterally.   - Resolving cystic nodule to the central lower abdomen.   -scattered erythematous papules on the abdomen, these have improved   -No other lesions of concern on areas examined.       Impression/Plan:  1. Acne Vulgaris - on isotretinoin, at the end  of month # 3.   Edu on avoidance of alcohol, waxing, skin lasers, tattoos, and blood donation. Reminded patient of risks regarding pregnancy. Discussed iPledge and need to  medication within 7 days of this visit. Advised to d/c all other acne medication.   Continue isotretinoin 80 mg daily pending negative pregnancy test. One month supply with no refills provided. Goal dose is 150-220mg/kg for this 122.47 kg patient.    Advised patient to take with food for increased absorption.  Labs including CBC, CMP, lipids will be obtained. She is fasting today. Elevated AST/ALT. Will hold dose for 2 weeks and recheck these labs.   Qualitative hCG was also obtained  Method of contraception includes: OCP and Condoms, patient states that boyfriend has a vasectomy   Total cumulative dose 7,200mg (58.8 mg/kg).   Patient's iPledge # is [id number]    2. Retinoid dermatitis, abdomen    Start triamcinolone 0.1% ointment, apply daily as needed to symptomatic areas.    Follow-up in 1 month, earlier for new or changing lesions.       Staff Involved:  Scribe/Staff    Scribe Disclosure:   ISonia, am serving as a scribe to document services personally performed by Judy Brown PA-C, based on data collection and the provider's statements to me.    Provider Disclosure:   The documentation recorded by the scribe accurately reflects the services I personally performed and the decisions made by me.    All risks, benefits and alternatives were discussed with patient.  Patient is in agreement and understands the assessment and plan.  All questions were answered.  Sun Screen Education was given.   Return to Clinic in 1 months or sooner as needed.   Judy Brown PA-C   [facility] Dermatology [facility]

## 2019-02-15 NOTE — LETTER
RE: Steffi Hernandez  [id number]  [place] [address]     Dear Colleague,    Thank you for referring your patient, Steffi Hernandez, to the [facility] DERMATOLOGY at [facility]. Please see a copy of my visit note below.    [facility] Dermatology Note    Dermatology Problem List:  1. Folliculitis, suspect early stage of Hidradenitis Suppurativa   Current treatment: isotretinoin 80 mg start 11/13/18  Prev treatment: Doxycycline 100mg PO BID, Keflex 500mg po BID  -Bacterial culture on the central abdomen - positive for heavy growth of staph lugdunensis  2. Acne vulgaris - isotretinoin 80 mg started 11/13/18, end of month #3.  -Previous tx: various topical medications, numerous oral antibiotics, spironolactone, OCPs  3. Retinoid dermatitis  - triamcinolone 0.1% ointment     Encounter Date: Feb 15, 2019    CC:  Chief Complaint   Patient presents with     Derm Problem     Accutane, Steffi states her facial acne is improving but still has acne on other parts of her body.     History of Present Illness:  Ms. Steffi Hernandez is a 38 year old female who presents as a follow-up for acne. The patient was last seen in the dermatology clinic on  01/17/19 by Dr. Parisi during which she continued isotretinoin 80 mg daily. Today she is at the end of month # 3. She reports that her acne is still present, but improving. She notes open sores on her stomach. She has been scratching at this area, as it has been itchy. She was unsure if the lesions were originally pimples. She has been trying to keep up with use of emollients, but has found certain areas are difficult to moisturize. Her eyelids have been increasingly flaky.     The patient reports tolerable mucocutaneous dryness, and denies arthralgias, myalgias, depression, suicidal ideation, diarrhea, headache, or blurred vision.      Past Medical History:   Patient Active Problem List   Diagnosis     Fatigue      Heavy menses     Memory difficulty     Allergic rhinosinusitis     CARDIOVASCULAR SCREENING; LDL GOAL LESS THAN 160     Acne     Acute maxillary sinusitis     Vitamin B12 deficiency without anemia     Vitamin D deficiency     Ascorbic acid deficiency     Iron deficiency     Moderate major depression (H)     Hirsutism     Pyridoxine deficiency     Dysmenorrhea     Family history of diabetes mellitus     Major depressive disorder, recurrent episode, moderate (HCC)     Morbid obesity, unspecified obesity type (H)     Chronic fatigue     Menorrhagia with regular cycle     Allergic rhinitis, unspecified allergic rhinitis type     LANDRY (obstructive sleep apnea)     Morbid obesity due to excess calories (H)     Sleep apnea, obstructive     Deviated nasal septum     Encounter for long-term (current) use of high-risk medication     Past Medical History:   Diagnosis Date     Allergic rhinitis, cause unspecified      Other acne      Sleep apnea     No treatment at this time.     Unspecified urinary incontinence      Past Surgical History:   Procedure Laterality Date     C NONSPECIFIC PROCEDURE      Urology surgery for night time bed wetting at age 6.     SEPTOPLASTY, TURBINOPLASTY, COMBINED N/A 2016    Procedure: COMBINED SEPTOPLASTY, TURBINOPLASTY;  Surgeon: Baldev Perez MD;  Location:  OR       Social History:   reports that  has never smoked. she has never used smokeless tobacco. She reports that she drinks alcohol. She reports that she does not use drugs.    Family History:  Family History   Problem Relation Age of Onset     Cerebrovascular Disease Father         x2     Hypertension Father      Seizure Disorder Father      Diabetes Paternal Grandfather      Cancer Paternal Grandfather           from throat cancer. Also had melanoma.     Blood Disease Paternal Grandfather         B12 DEFICIENCY - WAS ON B12 SHOTS     Cancer Maternal Grandmother          of colon cancer      Cerebrovascular Disease Maternal Grandfather      Heart Disease Maternal Grandfather               Neurologic Disorder Sister         Epilepsy diagnosed      Neurologic Disorder Sister         Epilepsy diagnosed      Melanoma No family hx of      Skin Cancer No family hx of        Medications:  Current Outpatient Medications   Medication Sig Dispense Refill     triamcinolone (KENALOG) 0.1 % external ointment Apply topically 2 times daily To rash on abdomen x 2 weeks. 80 g 1     fluticasone (FLONASE) 50 MCG/ACT spray Spray 2 sprays into both nostrils daily 1 Bottle 3     ISOtretinoin (ACCUTANE) 40 MG capsule Take 2 capsules (80 mg) by mouth daily 60 capsule 0     montelukast (SINGULAIR) 10 MG tablet Take 1 tablet (10 mg) by mouth At Bedtime INDICATION: TO TREAT ALLERGIC RHINITIS 30 tablet 11     norgestimate-ethinyl estradiol (ORTHO-CYCLEN, SPRINTEC) 0.25-35 MG-MCG per tablet Take 1 tablet by mouth daily 84 tablet 3     sodium chloride (OCEAN) 0.65 % nasal spray Spray 2 sprays into both nostrils 2 times daily 1 Bottle 3       Allergies   Allergen Reactions     Animal Dander      Cats      Dogs      Rabbit Protein      Seasonal Allergies      Physical exam:  Vitals: There were no vitals taken for this visit.  GEN: This is a well developed, well-nourished female in no acute distress, in a pleasant mood.    SKIN: Acne exam, which includes the face and neck was performed.  - Few closed comedones to cheeks and forehead  - Pink excoriated thin papules and linear excorations to central abdomen, bilaterally.   - Resolving cystic nodule to the central lower abdomen.   -scattered erythematous papules on the abdomen, these have improved   -No other lesions of concern on areas examined.       Impression/Plan:  1. Acne Vulgaris - on isotretinoin, at the end of month # 3.   Edu on avoidance of alcohol, waxing, skin lasers, tattoos, and blood donation. Reminded patient of risks regarding pregnancy. Discussed  iPledge and need to  medication within 7 days of this visit. Advised to d/c all other acne medication.   Continue isotretinoin 80 mg daily pending negative pregnancy test. One month supply with no refills provided. Goal dose is 150-220mg/kg for this 122.47 kg patient.    Advised patient to take with food for increased absorption.  Labs including CBC, CMP, lipids will be obtained. She is fasting today. Elevated AST/ALT. Will hold dose for 2 weeks and recheck these labs.   Qualitative hCG was also obtained  Method of contraception includes: OCP and Condoms, patient states that boyfriend has a vasectomy   Total cumulative dose 7,200mg (58.8 mg/kg).   Patient's iPledge # is [id number]    2. Retinoid dermatitis, abdomen    Start triamcinolone 0.1% ointment, apply daily as needed to symptomatic areas.    Follow-up in 1 month, earlier for new or changing lesions.     Staff Involved:  Scribe/Staff    Scribe Disclosure:   NICO, Sonia Alvarenga, am serving as a scribe to document services personally performed by Judy Brown PA-C, based on data collection and the provider's statements to me.    Provider Disclosure:   The documentation recorded by the scribe accurately reflects the services I personally performed and the decisions made by me.    All risks, benefits and alternatives were discussed with patient.  Patient is in agreement and understands the assessment and plan.  All questions were answered.  Sun Screen Education was given.   Return to Clinic in 1 months or sooner as needed.     Judy Brown PA-C   [facility]

## 2019-02-15 NOTE — NURSING NOTE
Dermatology Rooming Note    Steffi Hernandez's goals for this visit include:   Chief Complaint   Patient presents with     Derm Problem     Accutane, Steffi states her facial acne is improving but still has acne on other parts of her body.     Eliza Garcia LPN

## 2019-03-01 DIAGNOSIS — Z79.899 ON ISOTRETINOIN THERAPY: ICD-10-CM

## 2019-03-01 DIAGNOSIS — L70.0 ACNE VULGARIS: ICD-10-CM

## 2019-03-01 LAB
ALT SERPL W P-5'-P-CCNC: 222 U/L (ref 0–50)
AST SERPL W P-5'-P-CCNC: 252 U/L (ref 0–45)

## 2019-03-05 ASSESSMENT — ENCOUNTER SYMPTOMS
INSOMNIA: 1
HALLUCINATIONS: 0
WEIGHT LOSS: 0
INCREASED ENERGY: 1
CHILLS: 0
FATIGUE: 1
POLYPHAGIA: 0
FEVER: 0
NIGHT SWEATS: 0
POLYDIPSIA: 0
PANIC: 0
ALTERED TEMPERATURE REGULATION: 0
DECREASED APPETITE: 0
DECREASED CONCENTRATION: 1
NERVOUS/ANXIOUS: 1
WEIGHT GAIN: 0
DEPRESSION: 1

## 2019-03-19 ENCOUNTER — OFFICE VISIT (OUTPATIENT)
Dept: INTERNAL MEDICINE | Facility: CLINIC | Age: 39
End: 2019-03-19
Payer: COMMERCIAL

## 2019-03-19 VITALS
HEART RATE: 83 BPM | WEIGHT: 256 LBS | SYSTOLIC BLOOD PRESSURE: 116 MMHG | DIASTOLIC BLOOD PRESSURE: 78 MMHG | OXYGEN SATURATION: 96 % | BODY MASS INDEX: 42.6 KG/M2

## 2019-03-19 DIAGNOSIS — R79.89 ELEVATED LFTS: Primary | ICD-10-CM

## 2019-03-19 DIAGNOSIS — R79.89 ELEVATED LFTS: ICD-10-CM

## 2019-03-19 LAB
ALBUMIN SERPL-MCNC: 3.4 G/DL (ref 3.4–5)
ALP SERPL-CCNC: 76 U/L (ref 40–150)
ALT SERPL W P-5'-P-CCNC: 71 U/L (ref 0–50)
AST SERPL W P-5'-P-CCNC: 58 U/L (ref 0–45)
BILIRUB DIRECT SERPL-MCNC: 0.1 MG/DL (ref 0–0.2)
BILIRUB SERPL-MCNC: 0.2 MG/DL (ref 0.2–1.3)
PROT SERPL-MCNC: 8 G/DL (ref 6.8–8.8)

## 2019-03-19 ASSESSMENT — PAIN SCALES - GENERAL: PAINLEVEL: NO PAIN (0)

## 2019-03-19 NOTE — NURSING NOTE
Chief Complaint   Patient presents with     Recheck Medication     patient would like to discuss recent labs showing elevated liver enzymes        Deisi Hills EMT at 1:17 PM on 3/19/2019.

## 2019-03-19 NOTE — PATIENT INSTRUCTIONS
[facility] Medication Refill Request Information:  * Please contact your pharmacy regarding ANY request for medication refills.  ** [facility] Prescription Fax = [phone]  * Please allow 3 business days for routine medication refills.  * Please allow 5 business days for controlled substance medication refills.     [facility] Test Result notification information:  *You will be notified with in 7-10 days of your appointment day regarding the results of your test.  If you are on MyChart you will be notified as soon as the provider has reviewed the results and signed off on them.    [facility]: [phone]

## 2019-03-19 NOTE — PROGRESS NOTES
[facility]   LM Farr CNP  03/19/2019      Chief Complaint:   Recheck Medication       History of Present Illness:   Steffi Hernandez is a 38 year old female with a history of depression, LANDRY, vitamin B12 and D deficiency, and iron deficiency anemia who presents for evaluation of elevated liver enzymes.    Patient had elevated LFTs noted on labs 1 month ago and was instructed to discontinued Accutane. Upon recheck a week later LFTs were further elevated. Denies nausea, vomiting, and abdominal pain. No recent international travel. No alcohol for the last 3 weeks save a glass of wine and one shot of whiskey. Prior to the lab results she had been regularly drinking a glass of wine a night. She did drink more alcohol while in [place] prior to her 2/15 labs. She discontinued Tylenol. Has completed both HepA and HepB vaccine series. Patient reports her tattoos were done several years ago and professionally done. Denies hx IV drug use.     Review of Systems:   Pertinent items are noted in HPI or as in patient entered ROS below, remainder of complete ROS is negative.  Answers for HPI/ROS submitted by the patient on 3/5/2019   General Symptoms: Yes  Skin Symptoms: No  HENT Symptoms: No  EYE SYMPTOMS: No  HEART SYMPTOMS: No  LUNG SYMPTOMS: No  INTESTINAL SYMPTOMS: No  URINARY SYMPTOMS: No  GYNECOLOGIC SYMPTOMS: No  BREAST SYMPTOMS: No  SKELETAL SYMPTOMS: No  BLOOD SYMPTOMS: No  NERVOUS SYSTEM SYMPTOMS: No  MENTAL HEALTH SYMPTOMS: Yes  Fever: No  Loss of appetite: No  Weight loss: No  Weight gain: No  Fatigue: Yes  Night sweats: No  Chills: No  Increased stress: Yes  Excessive hunger: No  Excessive thirst: No  Feeling hot or cold when others believe the temperature is normal: No  Loss of height: No  Post-operative complications: No  Surgical site pain: No  Hallucinations: No  Change in or Loss of Energy: Yes  Hyperactivity: No  Confusion: No  Nervous or Anxious: Yes  Depression: Yes  Trouble  sleeping: Yes  Trouble thinking or concentrating: Yes  Mood changes: No  Panic attacks: No    Active Medications:     Current Outpatient Medications:      montelukast (SINGULAIR) 10 MG tablet, Take 1 tablet (10 mg) by mouth At Bedtime INDICATION: TO TREAT ALLERGIC RHINITIS, Disp: 30 tablet, Rfl: 11     norgestimate-ethinyl estradiol (ORTHO-CYCLEN, SPRINTEC) 0.25-35 MG-MCG per tablet, Take 1 tablet by mouth daily, Disp: 84 tablet, Rfl: 3     fluticasone (FLONASE) 50 MCG/ACT spray, Spray 2 sprays into both nostrils daily (Patient not taking: Reported on 3/19/2019), Disp: 1 Bottle, Rfl: 3     ISOtretinoin (ACCUTANE) 40 MG capsule, Take 2 capsules (80 mg) by mouth daily (Patient not taking: Reported on 3/19/2019), Disp: 60 capsule, Rfl: 0     sodium chloride (OCEAN) 0.65 % nasal spray, Spray 2 sprays into both nostrils 2 times daily (Patient not taking: Reported on 3/19/2019), Disp: 1 Bottle, Rfl: 3     triamcinolone (KENALOG) 0.1 % external ointment, Apply topically 2 times daily To rash on abdomen x 2 weeks. (Patient not taking: Reported on 3/19/2019), Disp: 80 g, Rfl: 1      Allergies:   Animal dander; Cats; Dogs; Rabbit protein; and Seasonal allergies      Past Medical History:  Allergic rhinitis  Sleep apnea  Heavy menses  Memory difficulty  Vitamin D deficiency   Vitamin B12 deficiency   Iron deficiency anemia  Depression  Hirsutism   Pyridoxine deficiency    Past Surgical History:  Urological surgery  Septoplasty     Family History:   Father: cerebrovascular disease, hypertension, seizure disorder  Paternal grandfather: diabetes mellitus, cancer  Paternal grandfather: vitamin B12 deficiency    Maternal grandmother: colon cancer  Maternal grandfather: cerebrovascular disease, heart disease  Sister: epilepsy     Social History:   Unmarried  Non smoker    Physical Exam:   /78 (BP Location: Right arm, Patient Position: Sitting, Cuff Size: Adult Large)   Pulse 83   Wt 116.1 kg (256 lb)   LMP 02/25/2019  (Approximate)   SpO2 96%   Breastfeeding? No   BMI 42.60 kg/m     Constitutional: Alert, oriented, pleasant, no acute distress  Head: Normocephalic, atraumatic  Eyes: Extra-ocular movements intact, pupils equally round and reactive bilaterally, no scleral icterus  ENT: Oropharynx clear, moist mucus membranes, good dentition  Neck: Supple, no lymphadenopathy, no thyromegaly  Cardiovascular: Regular rate and rhythm, no murmurs, rubs or gallops  Respiratory: Good air movement bilaterally, lungs clear, no wheezes/rales/rhonchi  GI: Abdomen soft, bowel sounds present, nondistended, nontender, no organomegaly or masses, exam limited d/t body habitus, no rebound/guarding  Skin: No rashes/lesions, no jaundice   Psychiatric: normal mentation, affect and mood      Labs  Component      Latest Ref Rng & Units 12/18/2018 1/17/2019 2/15/2019 3/1/2019   ALT      0 - 50 U/L 77 (H)  86 (H) 222 (H)   AST      0 - 45 U/L 45  93 (H) 252 (H)       Component      Latest Ref Rng & Units 3/19/2019   Bilirubin Direct      0.0 - 0.2 mg/dL 0.1   Bilirubin Total      0.2 - 1.3 mg/dL 0.2   Albumin      3.4 - 5.0 g/dL 3.4   Protein Total      6.8 - 8.8 g/dL 8.0   Alkaline Phosphatase      40 - 150 U/L 76   ALT      0 - 50 U/L 71 (H)   AST      0 - 45 U/L 58 (H)     Assessment and Plan:  Elevated LFTs  Elevated LFTs noted on labs in February, subsequently discontinued Accutane, however, LFTs were further elevated 2 weeks after stopping. She asked about possible causes to recent elevation of liver enzymes, discussed possibilities of YO or (rare) autoimmune hepatitis. She has completed both Hepatitis A and B vaccine series and has not traveled internationally recently. She will repeat labs including screenings for hepatitis infection and follow up with hepatology. She also agreed to complete and abdominal US for her hepatology visit.  LFTs improved on today's labs compared to previous but still elevated. Of note, she did have transient  transaminitis in May 2016 (, ).  - Hepatic panel  - US Abdomen complete  - GASTROENTEROLOGY ADULT REFERRAL  - HCV Screen with Reflex  - Hepatitis B Surface Antibody  - Hepatitis B surface antigen  - Hepatitis B core antibody  - Hepatitis Antibody A IgG  - Hepatitis A antibody IgM     Follow-up: as needed following hepatology visit        Scribe Disclosure:   I, Ole Figueroa, am serving as a scribe to document services personally performed by   LM Farr CNP at this visit, based upon the provider's statements to me. All documentation has been reviewed by the aforementioned provider prior to being entered into the official medical record.     Portions of this medical record were completed by a scribe. UPON MY REVIEW AND AUTHENTICATION BY ELECTRONIC SIGNATURE, this confirms (a) I performed the applicable clinical services, and (b) the record is accurate.     LM Farr CNP

## 2019-03-20 ENCOUNTER — ANCILLARY PROCEDURE (OUTPATIENT)
Dept: ULTRASOUND IMAGING | Facility: CLINIC | Age: 39
End: 2019-03-20
Attending: NURSE PRACTITIONER
Payer: COMMERCIAL

## 2019-03-20 DIAGNOSIS — R79.89 ELEVATED LFTS: ICD-10-CM

## 2019-03-20 LAB
HAV IGM SERPL QL IA: NONREACTIVE
HBV CORE AB SERPL QL IA: NONREACTIVE
HBV SURFACE AB SERPL IA-ACNC: 207.96 M[IU]/ML
HBV SURFACE AG SERPL QL IA: NONREACTIVE
HCV AB SERPL QL IA: NONREACTIVE

## 2019-04-01 ENCOUNTER — OFFICE VISIT (OUTPATIENT)
Dept: GASTROENTEROLOGY | Facility: CLINIC | Age: 39
End: 2019-04-01
Attending: INTERNAL MEDICINE
Payer: COMMERCIAL

## 2019-04-01 VITALS
BODY MASS INDEX: 42.52 KG/M2 | HEIGHT: 65 IN | DIASTOLIC BLOOD PRESSURE: 92 MMHG | WEIGHT: 255.2 LBS | HEART RATE: 88 BPM | SYSTOLIC BLOOD PRESSURE: 135 MMHG | OXYGEN SATURATION: 96 % | TEMPERATURE: 97.8 F

## 2019-04-01 DIAGNOSIS — R79.89 ELEVATED LFTS: ICD-10-CM

## 2019-04-01 DIAGNOSIS — G47.33 OBSTRUCTIVE SLEEP APNEA: ICD-10-CM

## 2019-04-01 DIAGNOSIS — K75.81 NASH (NONALCOHOLIC STEATOHEPATITIS): ICD-10-CM

## 2019-04-01 DIAGNOSIS — E66.01 CLASS 3 SEVERE OBESITY DUE TO EXCESS CALORIES WITH SERIOUS COMORBIDITY AND BODY MASS INDEX (BMI) OF 40.0 TO 44.9 IN ADULT (H): ICD-10-CM

## 2019-04-01 DIAGNOSIS — E78.5 DYSLIPIDEMIA: ICD-10-CM

## 2019-04-01 DIAGNOSIS — R79.89 ELEVATED LFTS: Primary | ICD-10-CM

## 2019-04-01 DIAGNOSIS — E66.813 CLASS 3 SEVERE OBESITY DUE TO EXCESS CALORIES WITH SERIOUS COMORBIDITY AND BODY MASS INDEX (BMI) OF 40.0 TO 44.9 IN ADULT (H): ICD-10-CM

## 2019-04-01 LAB
ALBUMIN SERPL-MCNC: 3.3 G/DL (ref 3.4–5)
ALP SERPL-CCNC: 81 U/L (ref 40–150)
ALT SERPL W P-5'-P-CCNC: 98 U/L (ref 0–50)
AST SERPL W P-5'-P-CCNC: 96 U/L (ref 0–45)
BILIRUB DIRECT SERPL-MCNC: <0.1 MG/DL (ref 0–0.2)
BILIRUB SERPL-MCNC: 0.3 MG/DL (ref 0.2–1.3)
PROT SERPL-MCNC: 7.9 G/DL (ref 6.8–8.8)

## 2019-04-01 PROCEDURE — 36415 COLL VENOUS BLD VENIPUNCTURE: CPT | Performed by: INTERNAL MEDICINE

## 2019-04-01 PROCEDURE — 82784 ASSAY IGA/IGD/IGG/IGM EACH: CPT | Performed by: INTERNAL MEDICINE

## 2019-04-01 PROCEDURE — 86256 FLUORESCENT ANTIBODY TITER: CPT | Performed by: INTERNAL MEDICINE

## 2019-04-01 PROCEDURE — 86038 ANTINUCLEAR ANTIBODIES: CPT | Performed by: INTERNAL MEDICINE

## 2019-04-01 PROCEDURE — 80076 HEPATIC FUNCTION PANEL: CPT | Performed by: INTERNAL MEDICINE

## 2019-04-01 PROCEDURE — 83516 IMMUNOASSAY NONANTIBODY: CPT | Performed by: INTERNAL MEDICINE

## 2019-04-01 PROCEDURE — G0463 HOSPITAL OUTPT CLINIC VISIT: HCPCS | Mod: ZF

## 2019-04-01 PROCEDURE — 86039 ANTINUCLEAR ANTIBODIES (ANA): CPT | Performed by: INTERNAL MEDICINE

## 2019-04-01 ASSESSMENT — MIFFLIN-ST. JEOR: SCORE: 1843.46

## 2019-04-01 ASSESSMENT — PAIN SCALES - GENERAL: PAINLEVEL: NO PAIN (0)

## 2019-04-01 NOTE — NURSING NOTE
"Chief Complaint   Patient presents with     Consult     elevated LFTs     BP (!) 135/92   Pulse 88   Temp 97.8  F (36.6  C) (Oral)   Ht 1.651 m (5' 5\")   Wt 115.8 kg (255 lb 3.2 oz)   SpO2 96%   BMI 42.47 kg/m    Tory Leggett MA    "

## 2019-04-01 NOTE — PATIENT INSTRUCTIONS
"Labs today    Labs in July    Return to see me in 6 months    Non-Alcoholic Fatty Liver Disease  - I had a long discussion with the patient about nonalcoholic fatty liver disease (NAFLD).  We discussed how fat deposits in the liver, how this leads to inflammation, and how chronic inflammation (YO) can ultimately lead to scarring and cirrhosis.  The long-term complications of fatty liver disease were discussed with the patient, including the increased risk of cardiovascular disease complications,the risk of developing diabetes (if not already), and variable progression to cirrhosis and end stage liver disease.  - At this time, the only way to differentiate between benign steatosis vs. nonalcoholic steatohepatitis (YO) is to perform a liver biopsy.  The liver biopsy would be important for diagnostic, as well as a prognostic and management perspective - If the patient only has benign steatosis, they will have low risk of progression and no treatment will be needed at that time except for lifestyle modifications.  - Up to 20-30% of patients can have YO with fibrosis despite having normal liver chemistries.   - Regarding the management of fatty liver disease, I did discuss with the patient about an appropriate diet, exercise and weight loss plan.  The patient should exercise regularly 4 times per week, with an average of about 45 minutes per day. The patient should be on low-carbohydrate, low-calorie diet (8625-0775 calories per day). The weight loss plan is to lose 7-10% of body weight in the next three to six months' time.  It has shown that patients who exercise regularly can have improvement of insulin resistance and resolution of fatty liver disease, even if they are not able to lose weight.   - Management of elevated cholesterol is also important in this population.  The use of \"statins\" (HMG-CoA reductase medications) are an effective means of therapy and are not contra-indicated in those with abnormal liver " tests OR those with cirrhosis.  The value of these medications in this population far outweigh the minor risks of abnormal liver tests.  Goal LDL in those with YO are < 100 mg/dL  - Consider the utility of liberalizing coffee consumption as some data that this may slow progression and reverse effects of YO-related fibrosis.  - We plan to order liver tests to be checked every 3 months to assess for dynamic changes, as a potential marker of another cause of chronic liver disease.

## 2019-04-01 NOTE — LETTER
4/1/2019      RE: Steffi Hernandez  [id number]  [place] [address]       Date of Service: 4/1/2019     Referring Provider: Ethan Brown    Subjective:            Steffi Hernandez is a 38 year old female presenting for evaluation of abnormal liver tests    History of Present Illness   Steffi Hernandez is a 38 year old female with past medical history of morbid obesity, obstructive sleep apnea, acne who presents with concerns of abnormal liver tests.    She reports that she has been made aware that she had abnormal liver tests recently.  In review of her medical records noted in September 2013 she had an AST 40, ALT 49.  Liver tests were elevated in May 2016 at the time that she underwent a sinus surgery, were normal in November 2018.  She was started on Accutane for treatment of her acne, being refractory to all other therapies, in December 2018.  Labs she notes that she drinks alcohol socially, noted on December 18, 2018 she had an AST 45 ALT 77 alkaline phosphatase 75, total bilirubin 0.5.  On March 1, 2019 she was noted to have ,  and her Accutane was discontinued.  An abdominal ultrasound was performed on March 20, 2019 which demonstrated increased echogenicity and coarsened echotexture consistent with hepatic steatosis.  Her spleen was also noted to be 11.6 cm.    1-2 drinks upwards of 3 times per week.  She denies a history of significant alcohol misuse.  Denies a history of IV or inhaled drugs of abuse.  Denies significant family history of liver disease, denies a history of blood transfusions or blood exposures.    Of note she has completed vaccination series for hepatitis A and B.    Past Medical History:  Past Medical History:   Diagnosis Date     Allergic rhinitis, cause unspecified      Obesity      Obstructive sleep apnea      Other acne      Sleep apnea     No treatment at this time.     Unspecified urinary incontinence        Surgical History:  Past Surgical  History:   Procedure Laterality Date     C NONSPECIFIC PROCEDURE      Urology surgery for night time bed wetting at age 6.     SEPTOPLASTY, TURBINOPLASTY, COMBINED N/A 5/18/2016    Procedure: COMBINED SEPTOPLASTY, TURBINOPLASTY;  Surgeon: Baldev Perez MD;  Location:  OR       Social History:  Social History     Socioeconomic History     Marital status: Single     Spouse name: Not on file     Number of children: Not on file     Years of education: Not on file     Highest education level: Not on file   Occupational History     Not on file   Social Needs     Financial resource strain: Not on file     Food insecurity:     Worry: Not on file     Inability: Not on file     Transportation needs:     Medical: Not on file     Non-medical: Not on file   Tobacco Use     Smoking status: Never Smoker     Smokeless tobacco: Never Used   Substance and Sexual Activity     Alcohol use: Yes     Comment: 6 drinks weekly     Drug use: No     Sexual activity: Yes     Partners: Male     Birth control/protection: None     Comment: LAST MALE PARTNER 1 YEAR AGO, Currently dating a female.   Lifestyle     Physical activity:     Days per week: Not on file     Minutes per session: Not on file     Stress: Not on file   Relationships     Social connections:     Talks on phone: Not on file     Gets together: Not on file     Attends [employer] service: Not on file     Active member of club or organization: Not on file     Attends meetings of clubs or organizations: Not on file     Relationship status: Not on file     Intimate partner violence:     Fear of current or ex partner: Not on file     Emotionally abused: Not on file     Physically abused: Not on file     Forced sexual activity: Not on file   Other Topics Concern     Parent/sibling w/ CABG, MI or angioplasty before 65F 55M? Not Asked   Social History Narrative     Not on file       Family History:  Family History   Problem Relation Age of Onset     Cerebrovascular Disease Father        "  x2     Hypertension Father      Seizure Disorder Father      Diabetes Paternal Grandfather      Cancer Paternal Grandfather           from throat cancer. Also had melanoma.     Blood Disease Paternal Grandfather         B12 DEFICIENCY - WAS ON B12 SHOTS     Cancer Maternal Grandmother          of colon cancer     Cerebrovascular Disease Maternal Grandfather      Heart Disease Maternal Grandfather               Neurologic Disorder Sister         Epilepsy diagnosed      Neurologic Disorder Sister         Epilepsy diagnosed      Melanoma No family hx of      Skin Cancer No family hx of      Liver Disease No family hx of        Medications:  Current Outpatient Medications   Medication     montelukast (SINGULAIR) 10 MG tablet     norgestimate-ethinyl estradiol (ORTHO-CYCLEN, SPRINTEC) 0.25-35 MG-MCG per tablet     fluticasone (FLONASE) 50 MCG/ACT spray     ISOtretinoin (ACCUTANE) 40 MG capsule     sodium chloride (OCEAN) 0.65 % nasal spray     triamcinolone (KENALOG) 0.1 % external ointment     No current facility-administered medications for this visit.        Review of Systems  A complete 10 point review of systems was asked and answered in the negative unless specifically commented upon in the HPI    Objective:         Vitals:    19 0939   BP: (!) 135/92   Pulse: 88   Temp: 97.8  F (36.6  C)   TempSrc: Oral   SpO2: 96%   Weight: 115.8 kg (255 lb 3.2 oz)   Height: 1.651 m (5' 5\")     Body mass index is 42.47 kg/m .     Physical Exam    Vitals reviewed.   Constitutional: Well-developed, well-nourished, in no apparent distress.    HEENT: Normocephalic. No scleral icterus. Moist oral mucosa. Dentition normal  Neck/Lymph: Normal ROM, supple. No thyromegaly.  No lymphadenopathy  Cardiac:  Regular rate and rhythm.  No overt murmurs  Respiratory: Clear to auscultation bilaterally.  No wheezes or rales  GI:  Abdomen soft, obese, non-tender. BS present. No shifting dullness. " No overt hepatosplenomegaly.     Skin:  Skin is warm and dry. No rash noted.  No jaundice. no spider nevi noted.  no palmar erythema  Peripheral Vascular: no lower extremity edema. 2+ pulses in all extremities  Musculoskeletal:  ROM intact, noraml muscle bulk    Psychiatric: Normal mood and affect. Behavior is normal.  Neuro:  no asterixis, no tremor    Labs and Diagnostic tests:  Lab Results   Component Value Date    BILITOTAL 0.3 04/01/2019    ALT 98 04/01/2019    AST 96 04/01/2019    ALKPHOS 81 04/01/2019     Lab Results   Component Value Date    ALBUMIN 3.3 04/01/2019    PROTTOTAL 7.9 04/01/2019          Imaging:  Abdominal US 3/20/2019  Liver shows increased echogenicity and coarsened echotexture  with obscuration of the intrahepatic architecture and vasculature and  attenuation of the ultrasound beam. Visualized portions of the  pancreas are normal. The spleen is normal in size at 11.6 cm. The  gallbladder is normal. Sonographic Escobar's sign is negative. There  are no gallstones. Common duct measures 6 mm. The aorta and IVC are  normal. The right kidney measures 11.4 cm. The left kidney measures  11.1 cm. There is no hydronephrosis.      Assessment and Plan:    Abnormal Liver Tests:    -Based on her history of morbid obesity, dyslipidemia, obstructive sleep apnea, and a questionable history of polycystic ovarian syndrome spelled the patient most likely has nonalcoholic fatty liver disease.  - Felt appropriate to rule out other common causes of abnormal liver tests including autoimmune liver disease; we will order these labs today  -While her liver tests have gone up slightly on today's labs compared to March I do feel it is appropriate to discontinue her Accutane indefinitely.  Her labs from March 1 with an ALT of 222 acutely is not consistent with a diagnosis of nonalcoholic steatohepatitis alone.  -We will plan to repeat labs again in 3 months  -If her liver tests continue to be abnormal at that time we  will plan for percutaneous liver biopsy    Non-Alcoholic Fatty Liver Disease  - I had a long discussion with the patient about nonalcoholic fatty liver disease (NAFLD).  We discussed how fat deposits in the liver, how this leads to inflammation, and how chronic inflammation (YO) can ultimately lead to scarring and cirrhosis.  The long-term complications of fatty liver disease were discussed with the patient, including the increased risk of cardiovascular disease complications,the risk of developing diabetes (if not already), and variable progression to cirrhosis and end stage liver disease.  - At this time, the only way to differentiate between benign steatosis vs. nonalcoholic steatohepatitis (YO) is to perform a liver biopsy.  The liver biopsy would be important for diagnostic, as well as a prognostic and management perspective - If the patient only has benign steatosis, they will have low risk of progression and no treatment will be needed at that time except for lifestyle modifications.  - It is important to note that liver tests alone as a screening test for YO are not sensitive, as up to 20-30% of patients can have YO with fibrosis despite having normal liver chemistries.   - Regarding the management of fatty liver disease, I did discuss with the patient about an appropriate diet, exercise and weight loss plan.  The patient should exercise regularly 3-4 times per week, with an average of about 30-45 minutes per day. The patient should be on low-carbohydrate, low-calorie diet (9730-4286 calories per day). The weight loss plan is to lose 7-10% of body weight in the next three to six months' time.  It has shown that patients who exercise regularly can have improvement of insulin resistance and resolution of fatty liver disease, even if they are not able to lose weight.   - Diabetes management is crucial with ideal goal Hgb A1c goal of =/< 7%. If possible addition of insulin sensitizing agents like  "metformin will be helpful.    - Management of elevated cholesterol is also important in this population.  The use of \"statins\" (HMG-CoA reductase medications) are an effective means of therapy and are not contra-indicated in those with abnormal liver tests OR those with cirrhosis.  The value of these medications in this population far outweigh the minor risks of abnormal liver tests.  Goal LDL in those with YO are < 100 mg/dL  - Consider the utility of liberalizing coffee consumption as some data that this may slow progression and reverse effects of YO-related fibrosis.  - We plan to order liver tests to be checked every 3 months to assess for dynamic changes, as a potential marker of another cause of chronic liver disease.  - If still abnormal in 3 months, will plan on a percutaneous liver biopsy    Routine Health Care in Patient with Chronic Liver Disease:  - All patients with liver disease, particularly those with cholestatic liver disease, are at an increased risk for osteoporosis.  We strongly recommend screening for Vitamin D deficiency at least twice yearly with aggressive supplementation/replacement as indicated.    - We also recommend a screening DEXA scan to evaluate for osteoporosis.  If present, should treat with calcium, Vitamin D supplementation, and recommend consideration of bisphosphonate therapy.  Also recommend follow up DEXA scans to evaluate for improvement of bone density on therapy.  - All patients with liver disease should avoid the use of Non-steroidal Anti-Inflammatory (NSAID) medications as they can cause significant injury to the kidneys in this population    Follow Up:  6 months     Thank you very much for the opportunity to participate in the care of this patient.  If you have any further questions, please don't hesitate to contact our office.    Thomas M. Leventhal, M.D.   of Medicine  Advanced & Transplant Hepatology  [facility]

## 2019-04-01 NOTE — LETTER
4/1/2019       RE: Steffi Hernandez  [address]  [place] [address]     Dear Colleague,    Thank you for referring your patient, Steffi Hernandez, to the [facility] HEPATOLOGY at [facility]. Please see a copy of my visit note below.    Date of Service: 4/1/2019     Referring Provider: Ethan Brown    Subjective:            Steffi Hernandez is a 38 year old female presenting for evaluation of abnormal liver tests    History of Present Illness   Steffi Hernandez is a 38 year old female with past medical history of morbid obesity, obstructive sleep apnea, acne who presents with concerns of abnormal liver tests.    She reports that she has been made aware that she had abnormal liver tests recently.  In review of her medical records noted in September 2013 she had an AST 40, ALT 49.  Liver tests were elevated in May 2016 at the time that she underwent a sinus surgery, were normal in November 2018.  She was started on Accutane for treatment of her acne, being refractory to all other therapies, in December 2018.  Labs she notes that she drinks alcohol socially, noted on December 18, 2018 she had an AST 45 ALT 77 alkaline phosphatase 75, total bilirubin 0.5.  On March 1, 2019 she was noted to have ,  and her Accutane was discontinued.  An abdominal ultrasound was performed on March 20, 2019 which demonstrated increased echogenicity and coarsened echotexture consistent with hepatic steatosis.  Her spleen was also noted to be 11.6 cm.    1-2 drinks upwards of 3 times per week.  She denies a history of significant alcohol misuse.  Denies a history of IV or inhaled drugs of abuse.  Denies significant family history of liver disease, denies a history of blood transfusions or blood exposures.    Of note she has completed vaccination series for hepatitis A and B.    Past Medical History:  Past Medical History:   Diagnosis Date     Allergic rhinitis, cause  unspecified      Obesity      Obstructive sleep apnea      Other acne      Sleep apnea     No treatment at this time.     Unspecified urinary incontinence        Surgical History:  Past Surgical History:   Procedure Laterality Date     C NONSPECIFIC PROCEDURE      Urology surgery for night time bed wetting at age 6.     SEPTOPLASTY, TURBINOPLASTY, COMBINED N/A 5/18/2016    Procedure: COMBINED SEPTOPLASTY, TURBINOPLASTY;  Surgeon: Baldev Perez MD;  Location:  OR       Social History:  Social History     Socioeconomic History     Marital status: Single     Spouse name: Not on file     Number of children: Not on file     Years of education: Not on file     Highest education level: Not on file   Occupational History     Not on file   Social Needs     Financial resource strain: Not on file     Food insecurity:     Worry: Not on file     Inability: Not on file     Transportation needs:     Medical: Not on file     Non-medical: Not on file   Tobacco Use     Smoking status: Never Smoker     Smokeless tobacco: Never Used   Substance and Sexual Activity     Alcohol use: Yes     Comment: 6 drinks weekly     Drug use: No     Sexual activity: Yes     Partners: Male     Birth control/protection: None     Comment: LAST MALE PARTNER 1 YEAR AGO, Currently dating a female.   Lifestyle     Physical activity:     Days per week: Not on file     Minutes per session: Not on file     Stress: Not on file   Relationships     Social connections:     Talks on phone: Not on file     Gets together: Not on file     Attends Worship service: Not on file     Active member of club or organization: Not on file     Attends meetings of clubs or organizations: Not on file     Relationship status: Not on file     Intimate partner violence:     Fear of current or ex partner: Not on file     Emotionally abused: Not on file     Physically abused: Not on file     Forced sexual activity: Not on file   Other Topics Concern     Parent/sibling w/ CABG, MI  "or angioplasty before 65F 55M? Not Asked   Social History Narrative     Not on file       Family History:  Family History   Problem Relation Age of Onset     Cerebrovascular Disease Father         x2     Hypertension Father      Seizure Disorder Father      Diabetes Paternal Grandfather      Cancer Paternal Grandfather           from throat cancer. Also had melanoma.     Blood Disease Paternal Grandfather         B12 DEFICIENCY - WAS ON B12 SHOTS     Cancer Maternal Grandmother          of colon cancer     Cerebrovascular Disease Maternal Grandfather      Heart Disease Maternal Grandfather               Neurologic Disorder Sister         Epilepsy diagnosed      Neurologic Disorder Sister         Epilepsy diagnosed      Melanoma No family hx of      Skin Cancer No family hx of      Liver Disease No family hx of        Medications:  Current Outpatient Medications   Medication     montelukast (SINGULAIR) 10 MG tablet     norgestimate-ethinyl estradiol (ORTHO-CYCLEN, SPRINTEC) 0.25-35 MG-MCG per tablet     fluticasone (FLONASE) 50 MCG/ACT spray     ISOtretinoin (ACCUTANE) 40 MG capsule     sodium chloride (OCEAN) 0.65 % nasal spray     triamcinolone (KENALOG) 0.1 % external ointment     No current facility-administered medications for this visit.        Review of Systems  A complete 10 point review of systems was asked and answered in the negative unless specifically commented upon in the HPI    Objective:         Vitals:    19 0939   BP: (!) 135/92   Pulse: 88   Temp: 97.8  F (36.6  C)   TempSrc: Oral   SpO2: 96%   Weight: 115.8 kg (255 lb 3.2 oz)   Height: 1.651 m (5' 5\")     Body mass index is 42.47 kg/m .     Physical Exam    Vitals reviewed.   Constitutional: Well-developed, well-nourished, in no apparent distress.    HEENT: Normocephalic. No scleral icterus. Moist oral mucosa. Dentition normal  Neck/Lymph: Normal ROM, supple. No thyromegaly.  No " lymphadenopathy  Cardiac:  Regular rate and rhythm.  No overt murmurs  Respiratory: Clear to auscultation bilaterally.  No wheezes or rales  GI:  Abdomen soft, obese, non-tender. BS present. No shifting dullness. No overt hepatosplenomegaly.     Skin:  Skin is warm and dry. No rash noted.  No jaundice. no spider nevi noted.  no palmar erythema  Peripheral Vascular: no lower extremity edema. 2+ pulses in all extremities  Musculoskeletal:  ROM intact, noraml muscle bulk    Psychiatric: Normal mood and affect. Behavior is normal.  Neuro:  no asterixis, no tremor    Labs and Diagnostic tests:  Lab Results   Component Value Date    BILITOTAL 0.3 04/01/2019    ALT 98 04/01/2019    AST 96 04/01/2019    ALKPHOS 81 04/01/2019     Lab Results   Component Value Date    ALBUMIN 3.3 04/01/2019    PROTTOTAL 7.9 04/01/2019          Imaging:  Abdominal US 3/20/2019  Liver shows increased echogenicity and coarsened echotexture  with obscuration of the intrahepatic architecture and vasculature and  attenuation of the ultrasound beam. Visualized portions of the  pancreas are normal. The spleen is normal in size at 11.6 cm. The  gallbladder is normal. Sonographic Escobar's sign is negative. There  are no gallstones. Common duct measures 6 mm. The aorta and IVC are  normal. The right kidney measures 11.4 cm. The left kidney measures  11.1 cm. There is no hydronephrosis.      Assessment and Plan:    Abnormal Liver Tests:    -Based on her history of morbid obesity, dyslipidemia, obstructive sleep apnea, and a questionable history of polycystic ovarian syndrome spelled the patient most likely has nonalcoholic fatty liver disease.  - Felt appropriate to rule out other common causes of abnormal liver tests including autoimmune liver disease; we will order these labs today  -While her liver tests have gone up slightly on today's labs compared to March I do feel it is appropriate to discontinue her Accutane indefinitely.  Her labs from March 1  with an ALT of 222 acutely is not consistent with a diagnosis of nonalcoholic steatohepatitis alone.  -We will plan to repeat labs again in 3 months  -If her liver tests continue to be abnormal at that time we will plan for percutaneous liver biopsy    Non-Alcoholic Fatty Liver Disease  - I had a long discussion with the patient about nonalcoholic fatty liver disease (NAFLD).  We discussed how fat deposits in the liver, how this leads to inflammation, and how chronic inflammation (YO) can ultimately lead to scarring and cirrhosis.  The long-term complications of fatty liver disease were discussed with the patient, including the increased risk of cardiovascular disease complications,the risk of developing diabetes (if not already), and variable progression to cirrhosis and end stage liver disease.  - At this time, the only way to differentiate between benign steatosis vs. nonalcoholic steatohepatitis (YO) is to perform a liver biopsy.  The liver biopsy would be important for diagnostic, as well as a prognostic and management perspective - If the patient only has benign steatosis, they will have low risk of progression and no treatment will be needed at that time except for lifestyle modifications.  - It is important to note that liver tests alone as a screening test for YO are not sensitive, as up to 20-30% of patients can have YO with fibrosis despite having normal liver chemistries.   - Regarding the management of fatty liver disease, I did discuss with the patient about an appropriate diet, exercise and weight loss plan.  The patient should exercise regularly 3-4 times per week, with an average of about 30-45 minutes per day. The patient should be on low-carbohydrate, low-calorie diet (5423-7727 calories per day). The weight loss plan is to lose 7-10% of body weight in the next three to six months' time.  It has shown that patients who exercise regularly can have improvement of insulin resistance and  "resolution of fatty liver disease, even if they are not able to lose weight.   - Diabetes management is crucial with ideal goal Hgb A1c goal of =/< 7%. If possible addition of insulin sensitizing agents like metformin will be helpful.    - Management of elevated cholesterol is also important in this population.  The use of \"statins\" (HMG-CoA reductase medications) are an effective means of therapy and are not contra-indicated in those with abnormal liver tests OR those with cirrhosis.  The value of these medications in this population far outweigh the minor risks of abnormal liver tests.  Goal LDL in those with YO are < 100 mg/dL  - Consider the utility of liberalizing coffee consumption as some data that this may slow progression and reverse effects of YO-related fibrosis.  - We plan to order liver tests to be checked every 3 months to assess for dynamic changes, as a potential marker of another cause of chronic liver disease.  - If still abnormal in 3 months, will plan on a percutaneous liver biopsy    Routine Health Care in Patient with Chronic Liver Disease:  - All patients with liver disease, particularly those with cholestatic liver disease, are at an increased risk for osteoporosis.  We strongly recommend screening for Vitamin D deficiency at least twice yearly with aggressive supplementation/replacement as indicated.    - We also recommend a screening DEXA scan to evaluate for osteoporosis.  If present, should treat with calcium, Vitamin D supplementation, and recommend consideration of bisphosphonate therapy.  Also recommend follow up DEXA scans to evaluate for improvement of bone density on therapy.  - All patients with liver disease should avoid the use of Non-steroidal Anti-Inflammatory (NSAID) medications as they can cause significant injury to the kidneys in this population    Follow Up:  6 months     Thank you very much for the opportunity to participate in the care of this patient.  If you have " any further questions, please don't hesitate to contact our office.    Thomas M. Leventhal, M.D.   of Medicine  Advanced & Transplant Hepatology  [facility]      Again, thank you for allowing me to participate in the care of your patient.      Sincerely,    Thomas M. Leventhal, MD

## 2019-04-01 NOTE — PROGRESS NOTES
Date of Service: 4/1/2019     Referring Provider: Ethan Brown    Subjective:            Steffi Hernandez is a 38 year old female presenting for evaluation of abnormal liver tests    History of Present Illness   Steffi Hernandez is a 38 year old female with past medical history of morbid obesity, obstructive sleep apnea, acne who presents with concerns of abnormal liver tests.    She reports that she has been made aware that she had abnormal liver tests recently.  In review of her medical records noted in September 2013 she had an AST 40, ALT 49.  Liver tests were elevated in May 2016 at the time that she underwent a sinus surgery, were normal in November 2018.  She was started on Accutane for treatment of her acne, being refractory to all other therapies, in December 2018.  Labs she notes that she drinks alcohol socially, noted on December 18, 2018 she had an AST 45 ALT 77 alkaline phosphatase 75, total bilirubin 0.5.  On March 1, 2019 she was noted to have ,  and her Accutane was discontinued.  An abdominal ultrasound was performed on March 20, 2019 which demonstrated increased echogenicity and coarsened echotexture consistent with hepatic steatosis.  Her spleen was also noted to be 11.6 cm.    1-2 drinks upwards of 3 times per week.  She denies a history of significant alcohol misuse.  Denies a history of IV or inhaled drugs of abuse.  Denies significant family history of liver disease, denies a history of blood transfusions or blood exposures.    Of note she has completed vaccination series for hepatitis A and B.    Past Medical History:  Past Medical History:   Diagnosis Date     Allergic rhinitis, cause unspecified      Obesity      Obstructive sleep apnea      Other acne      Sleep apnea     No treatment at this time.     Unspecified urinary incontinence        Surgical History:  Past Surgical History:   Procedure Laterality Date     C NONSPECIFIC PROCEDURE      Urology surgery for night  time bed wetting at age 6.     SEPTOPLASTY, TURBINOPLASTY, COMBINED N/A 5/18/2016    Procedure: COMBINED SEPTOPLASTY, TURBINOPLASTY;  Surgeon: Baldev Perez MD;  Location: [facility] OR       Social History:  Social History     Socioeconomic History     Marital status: Single     Spouse name: Not on file     Number of children: Not on file     Years of education: Not on file     Highest education level: Not on file   Occupational History     Not on file   Social Needs     Financial resource strain: Not on file     Food insecurity:     Worry: Not on file     Inability: Not on file     Transportation needs:     Medical: Not on file     Non-medical: Not on file   Tobacco Use     Smoking status: Never Smoker     Smokeless tobacco: Never Used   Substance and Sexual Activity     Alcohol use: Yes     Comment: 6 drinks weekly     Drug use: No     Sexual activity: Yes     Partners: Male     Birth control/protection: None     Comment: LAST MALE PARTNER 1 YEAR AGO, Currently dating a female.   Lifestyle     Physical activity:     Days per week: Not on file     Minutes per session: Not on file     Stress: Not on file   Relationships     Social connections:     Talks on phone: Not on file     Gets together: Not on file     Attends Restorationism service: Not on file     Active member of club or organization: Not on file     Attends meetings of clubs or organizations: Not on file     Relationship status: Not on file     Intimate partner violence:     Fear of current or ex partner: Not on file     Emotionally abused: Not on file     Physically abused: Not on file     Forced sexual activity: Not on file   Other Topics Concern     Parent/sibling w/ CABG, MI or angioplasty before 65F 55M? Not Asked   Social History Narrative     Not on file       Family History:  Family History   Problem Relation Age of Onset     Cerebrovascular Disease Father         x2     Hypertension Father      Seizure Disorder Father      Diabetes Paternal Grandfather   "    Cancer Paternal Grandfather           from throat cancer. Also had melanoma.     Blood Disease Paternal Grandfather         B12 DEFICIENCY - WAS ON B12 SHOTS     Cancer Maternal Grandmother          of colon cancer     Cerebrovascular Disease Maternal Grandfather      Heart Disease Maternal Grandfather               Neurologic Disorder Sister         Epilepsy diagnosed      Neurologic Disorder Sister         Epilepsy diagnosed      Melanoma No family hx of      Skin Cancer No family hx of      Liver Disease No family hx of        Medications:  Current Outpatient Medications   Medication     montelukast (SINGULAIR) 10 MG tablet     norgestimate-ethinyl estradiol (ORTHO-CYCLEN, SPRINTEC) 0.25-35 MG-MCG per tablet     fluticasone (FLONASE) 50 MCG/ACT spray     ISOtretinoin (ACCUTANE) 40 MG capsule     sodium chloride (OCEAN) 0.65 % nasal spray     triamcinolone (KENALOG) 0.1 % external ointment     No current facility-administered medications for this visit.        Review of Systems  A complete 10 point review of systems was asked and answered in the negative unless specifically commented upon in the HPI    Objective:         Vitals:    19 0939   BP: (!) 135/92   Pulse: 88   Temp: 97.8  F (36.6  C)   TempSrc: Oral   SpO2: 96%   Weight: 115.8 kg (255 lb 3.2 oz)   Height: 1.651 m (5' 5\")     Body mass index is 42.47 kg/m .     Physical Exam    Vitals reviewed.   Constitutional: Well-developed, well-nourished, in no apparent distress.    HEENT: Normocephalic. No scleral icterus. Moist oral mucosa. Dentition normal  Neck/Lymph: Normal ROM, supple. No thyromegaly.  No lymphadenopathy  Cardiac:  Regular rate and rhythm.  No overt murmurs  Respiratory: Clear to auscultation bilaterally.  No wheezes or rales  GI:  Abdomen soft, obese, non-tender. BS present. No shifting dullness. No overt hepatosplenomegaly.     Skin:  Skin is warm and dry. No rash noted.  No jaundice. no " spider nevi noted.  no palmar erythema  Peripheral Vascular: no lower extremity edema. 2+ pulses in all extremities  Musculoskeletal:  ROM intact, noraml muscle bulk    Psychiatric: Normal mood and affect. Behavior is normal.  Neuro:  no asterixis, no tremor    Labs and Diagnostic tests:  Lab Results   Component Value Date    BILITOTAL 0.3 04/01/2019    ALT 98 04/01/2019    AST 96 04/01/2019    ALKPHOS 81 04/01/2019     Lab Results   Component Value Date    ALBUMIN 3.3 04/01/2019    PROTTOTAL 7.9 04/01/2019          Imaging:  Abdominal US 3/20/2019  Liver shows increased echogenicity and coarsened echotexture  with obscuration of the intrahepatic architecture and vasculature and  attenuation of the ultrasound beam. Visualized portions of the  pancreas are normal. The spleen is normal in size at 11.6 cm. The  gallbladder is normal. Sonographic Escobar's sign is negative. There  are no gallstones. Common duct measures 6 mm. The aorta and IVC are  normal. The right kidney measures 11.4 cm. The left kidney measures  11.1 cm. There is no hydronephrosis.      Assessment and Plan:    Abnormal Liver Tests:    -Based on her history of morbid obesity, dyslipidemia, obstructive sleep apnea, and a questionable history of polycystic ovarian syndrome spelled the patient most likely has nonalcoholic fatty liver disease.  - Felt appropriate to rule out other common causes of abnormal liver tests including autoimmune liver disease; we will order these labs today  -While her liver tests have gone up slightly on today's labs compared to March I do feel it is appropriate to discontinue her Accutane indefinitely.  Her labs from March 1 with an ALT of 222 acutely is not consistent with a diagnosis of nonalcoholic steatohepatitis alone.  -We will plan to repeat labs again in 3 months  -If her liver tests continue to be abnormal at that time we will plan for percutaneous liver biopsy    Non-Alcoholic Fatty Liver Disease  - I had a long  discussion with the patient about nonalcoholic fatty liver disease (NAFLD).  We discussed how fat deposits in the liver, how this leads to inflammation, and how chronic inflammation (YO) can ultimately lead to scarring and cirrhosis.  The long-term complications of fatty liver disease were discussed with the patient, including the increased risk of cardiovascular disease complications,the risk of developing diabetes (if not already), and variable progression to cirrhosis and end stage liver disease.  - At this time, the only way to differentiate between benign steatosis vs. nonalcoholic steatohepatitis (YO) is to perform a liver biopsy.  The liver biopsy would be important for diagnostic, as well as a prognostic and management perspective - If the patient only has benign steatosis, they will have low risk of progression and no treatment will be needed at that time except for lifestyle modifications.  - It is important to note that liver tests alone as a screening test for YO are not sensitive, as up to 20-30% of patients can have YO with fibrosis despite having normal liver chemistries.   - Regarding the management of fatty liver disease, I did discuss with the patient about an appropriate diet, exercise and weight loss plan.  The patient should exercise regularly 3-4 times per week, with an average of about 30-45 minutes per day. The patient should be on low-carbohydrate, low-calorie diet (1030-0708 calories per day). The weight loss plan is to lose 7-10% of body weight in the next three to six months' time.  It has shown that patients who exercise regularly can have improvement of insulin resistance and resolution of fatty liver disease, even if they are not able to lose weight.   - Diabetes management is crucial with ideal goal Hgb A1c goal of =/< 7%. If possible addition of insulin sensitizing agents like metformin will be helpful.    - Management of elevated cholesterol is also important in this  "population.  The use of \"statins\" (HMG-CoA reductase medications) are an effective means of therapy and are not contra-indicated in those with abnormal liver tests OR those with cirrhosis.  The value of these medications in this population far outweigh the minor risks of abnormal liver tests.  Goal LDL in those with YO are < 100 mg/dL  - Consider the utility of liberalizing coffee consumption as some data that this may slow progression and reverse effects of YO-related fibrosis.  - We plan to order liver tests to be checked every 3 months to assess for dynamic changes, as a potential marker of another cause of chronic liver disease.  - If still abnormal in 3 months, will plan on a percutaneous liver biopsy    Routine Health Care in Patient with Chronic Liver Disease:  - All patients with liver disease, particularly those with cholestatic liver disease, are at an increased risk for osteoporosis.  We strongly recommend screening for Vitamin D deficiency at least twice yearly with aggressive supplementation/replacement as indicated.    - We also recommend a screening DEXA scan to evaluate for osteoporosis.  If present, should treat with calcium, Vitamin D supplementation, and recommend consideration of bisphosphonate therapy.  Also recommend follow up DEXA scans to evaluate for improvement of bone density on therapy.  - All patients with liver disease should avoid the use of Non-steroidal Anti-Inflammatory (NSAID) medications as they can cause significant injury to the kidneys in this population    Follow Up:  6 months     Thank you very much for the opportunity to participate in the care of this patient.  If you have any further questions, please don't hesitate to contact our office.    Thomas M. Leventhal, M.D.   of Medicine  Advanced & Transplant Hepatology  [facility]    "

## 2019-04-02 LAB
ANA PAT SER IF-IMP: ABNORMAL
ANA SER QL IF: ABNORMAL
ANA TITR SER IF: ABNORMAL {TITER}
IGG SERPL-MCNC: 1170 MG/DL (ref 695–1620)
IGM SERPL-MCNC: 106 MG/DL (ref 60–265)
MITOCHONDRIA M2 IGG SER-ACNC: 1 U/ML
SMA IGG SER-ACNC: 22 UNITS (ref 0–19)

## 2019-04-03 ENCOUNTER — MYC MEDICAL ADVICE (OUTPATIENT)
Dept: GASTROENTEROLOGY | Facility: CLINIC | Age: 39
End: 2019-04-03

## 2019-04-03 ENCOUNTER — TELEPHONE (OUTPATIENT)
Dept: GASTROENTEROLOGY | Facility: CLINIC | Age: 39
End: 2019-04-03

## 2019-04-03 DIAGNOSIS — R79.89 ELEVATED LFTS: Primary | ICD-10-CM

## 2019-04-03 LAB — SMOOTH MUSCLE ABY IGG TITER: ABNORMAL

## 2019-04-03 NOTE — TELEPHONE ENCOUNTER
Orders entered. Patient updated.    Lulu Jaffe LPN  Hepatology Clinic      ----- Message from Thomas Michael Leventhal, MD sent at 4/3/2019  3:48 PM CDT -----  Can we please order the following in 1 month:    Hepatic Panel  IgG  F-Actin      Thanks!

## 2019-05-13 DIAGNOSIS — Z79.899 ON ISOTRETINOIN THERAPY: ICD-10-CM

## 2019-05-13 DIAGNOSIS — Z79.899 ENCOUNTER FOR LONG-TERM (CURRENT) USE OF HIGH-RISK MEDICATION: ICD-10-CM

## 2019-05-13 DIAGNOSIS — R79.89 ELEVATED LFTS: ICD-10-CM

## 2019-05-13 DIAGNOSIS — L70.0 ACNE VULGARIS: ICD-10-CM

## 2019-05-13 LAB
ALBUMIN SERPL-MCNC: 3.1 G/DL (ref 3.4–5)
ALP SERPL-CCNC: 62 U/L (ref 40–150)
ALT SERPL W P-5'-P-CCNC: 36 U/L (ref 0–50)
AST SERPL W P-5'-P-CCNC: 27 U/L (ref 0–45)
BILIRUB DIRECT SERPL-MCNC: 0.2 MG/DL (ref 0–0.2)
BILIRUB SERPL-MCNC: 0.5 MG/DL (ref 0.2–1.3)
IGG SERPL-MCNC: 1050 MG/DL (ref 695–1620)
PROT SERPL-MCNC: 7.1 G/DL (ref 6.8–8.8)

## 2019-05-14 LAB — SMA IGG SER-ACNC: 12 UNITS (ref 0–19)

## 2019-07-09 ENCOUNTER — OFFICE VISIT (OUTPATIENT)
Dept: OBGYN | Facility: CLINIC | Age: 39
End: 2019-07-09
Attending: NURSE PRACTITIONER
Payer: COMMERCIAL

## 2019-07-09 VITALS
HEART RATE: 77 BPM | BODY MASS INDEX: 44.1 KG/M2 | SYSTOLIC BLOOD PRESSURE: 133 MMHG | WEIGHT: 265 LBS | DIASTOLIC BLOOD PRESSURE: 96 MMHG

## 2019-07-09 DIAGNOSIS — L98.9 SKIN LESION: Primary | ICD-10-CM

## 2019-07-09 PROCEDURE — G0463 HOSPITAL OUTPT CLINIC VISIT: HCPCS | Mod: ZF

## 2019-07-09 ASSESSMENT — PAIN SCALES - GENERAL: PAINLEVEL: NO PAIN (0)

## 2019-07-09 NOTE — LETTER
2019       RE: Steffi Hernandez  [address]  [place] [address]     Dear Colleague,    Thank you for referring your patient, Steffi Hernandez, to the [facility] at [facility]. Please see a copy of my visit note below.    Subjective:   Steffi Hernandez is a 39 yo female, , who presents to clinic today with a left breast concern.   Reports that 1 week ago she noted a pea sized red area with a pea sized lump under the skin under the left breast. The lump has completely resolved, but the skin is still red. Denies pain, fever or chills. She denies change in nipple position or nipple discharge. Hx of fatty tumor on upper left breast 7 years ago per pt report. Follows with dermatology for hx of folliculitis, suspected early stage hidradenitis suppurativa and severe papulopustular acne with scaring. Had previously been on Accutane earlier this year.   LMP: 6/15/19.   Contraception: COCs  Fam hx: No family hx of breast, ovarian, or uterine cancer.     Objective:  BP (!) 133/96   Pulse 77   Wt 120.2 kg (265 lb)   LMP 06/15/2019   Breastfeeding? No   BMI 44.10 kg/m     General: pleasant female, in no acute distress   Respiratory: non-labored breathing  Breasts: normal without suspicious masses, skin changes or axillary nodes. Skin tissue soft bilaterally. Nipples everted, symmetrical, without discharge.  5mm x 1cm pink area at 6 o'clock position midway between nipple and inframammary ridge on left breast without elevation or palpable mass. Pustularpapular acne noted under axilla bilaterally.       Assessment:  Encounter Diagnosis   Name Primary?     Skin lesion Yes       Plan:  Breast lesion appears very superficial and to be resolving. Most likely a lesion related to hidradenitis suppurativa. No evidence of breast mass or any signs/ symptoms of breast malignancy.    RTC if skin color is not returning to her normal within 2 weeks, or if  symptoms worsen.     Patient expressed understanding and agreement with the plan for care.    I, Naomy Tello, completed the PFSH and ROS. I then acted as a scribe for FARTUN Jimenez, for the remainder of the visit.  Naomy Tello, RN, BSN, DNP Student, NP Specialty    I agree with the PFSH and ROS as completed by the FARTUN Student, except for changes made by me.  The remainder of the encounter was performed by me and scribed by the FARTUN Student.  The scribed note accurately reflects my personal services and decisions made by me.  Es Harrison DNP, APRN, FARTUN

## 2019-07-09 NOTE — PROGRESS NOTES
Subjective:   Steffi Hernandez is a 37 yo female, , who presents to clinic today with a left breast concern.   Reports that 1 week ago she noted a pea sized red area with a pea sized lump under the skin under the left breast. The lump has completely resolved, but the skin is still red. Denies pain, fever or chills. She denies change in nipple position or nipple discharge. Hx of fatty tumor on upper left breast 7 years ago per pt report. Follows with dermatology for hx of folliculitis, suspected early stage hidradenitis suppurativa and severe papulopustular acne with scaring. Had previously been on Accutane earlier this year.   LMP: 6/15/19.   Contraception: COCs  Fam hx: No family hx of breast, ovarian, or uterine cancer.     Objective:  BP (!) 133/96   Pulse 77   Wt 120.2 kg (265 lb)   LMP 06/15/2019   Breastfeeding? No   BMI 44.10 kg/m    General: pleasant female, in no acute distress   Respiratory: non-labored breathing  Breasts: normal without suspicious masses, skin changes or axillary nodes. Skin tissue soft bilaterally. Nipples everted, symmetrical, without discharge.  5mm x 1cm pink area at 6 o'clock position midway between nipple and inframammary ridge on left breast without elevation or palpable mass. Pustularpapular acne noted under axilla bilaterally.       Assessment:  Encounter Diagnosis   Name Primary?     Skin lesion Yes       Plan:  Breast lesion appears very superficial and to be resolving. Most likely a lesion related to hidradenitis suppurativa. No evidence of breast mass or any signs/ symptoms of breast malignancy.    RTC if skin color is not returning to her normal within 2 weeks, or if symptoms worsen.     Patient expressed understanding and agreement with the plan for care.    I, Naomy Tello, completed the PFSH and ROS. I then acted as a scribe for FARTUN Jimenez, for the remainder of the visit.  Naomy Tello, RN, BSN, DNP Student, FARTUN Specialty    I agree with the PFSH and  ROS as completed by the WHNP Student, except for changes made by me.  The remainder of the encounter was performed by me and scribed by the WHNP Student.  The scribed note accurately reflects my personal services and decisions made by me.  Es Harrison, DNP, APRN, WHNP

## 2019-07-09 NOTE — NURSING NOTE
Chief Complaint   Patient presents with     Follow Up     C/O Left breastlump/pimple   Jesica Blount LPN

## 2019-09-06 DIAGNOSIS — J30.89 CHRONIC NON-SEASONAL ALLERGIC RHINITIS: ICD-10-CM

## 2019-09-06 RX ORDER — MONTELUKAST SODIUM 10 MG/1
10 TABLET ORAL AT BEDTIME
Qty: 30 TABLET | Refills: 5 | Status: SHIPPED | OUTPATIENT
Start: 2019-09-06 | End: 2020-09-12

## 2019-09-10 DIAGNOSIS — Z30.011 ENCOUNTER FOR INITIAL PRESCRIPTION OF CONTRACEPTIVE PILLS: ICD-10-CM

## 2019-09-10 RX ORDER — NORGESTIMATE AND ETHINYL ESTRADIOL 0.25-0.035
1 KIT ORAL DAILY
Qty: 84 TABLET | Refills: 3 | Status: SHIPPED | OUTPATIENT
Start: 2019-09-10 | End: 2020-02-10

## 2019-09-10 NOTE — TELEPHONE ENCOUNTER
Received refill request for OCP.  Last in clinic 10/2018 where med was ordered, plan to return in one year for annual. Sent refill, called patient and left message that she is due for annual and can call to schedule.

## 2019-09-18 DIAGNOSIS — K75.81 NASH (NONALCOHOLIC STEATOHEPATITIS): Primary | ICD-10-CM

## 2019-10-02 ENCOUNTER — HEALTH MAINTENANCE LETTER (OUTPATIENT)
Age: 39
End: 2019-10-02

## 2019-10-22 ASSESSMENT — ENCOUNTER SYMPTOMS
STIFFNESS: 0
BACK PAIN: 1
NAIL CHANGES: 0
TROUBLE SWALLOWING: 0
SPUTUM PRODUCTION: 0
DIZZINESS: 1
COUGH: 1
NECK MASS: 0
HEMATURIA: 0
DISTURBANCES IN COORDINATION: 0
SEIZURES: 0
TREMORS: 0
EYE IRRITATION: 0
PANIC: 0
MEMORY LOSS: 0
SPEECH CHANGE: 0
ARTHRALGIAS: 0
HEADACHES: 1
WEAKNESS: 0
SHORTNESS OF BREATH: 0
DEPRESSION: 1
DYSURIA: 0
DYSPNEA ON EXERTION: 0
POSTURAL DYSPNEA: 0
TASTE DISTURBANCE: 0
SWOLLEN GLANDS: 0
NERVOUS/ANXIOUS: 1
PARALYSIS: 0
EYE WATERING: 0
WHEEZING: 0
POOR WOUND HEALING: 0
JOINT SWELLING: 0
NUMBNESS: 1
DECREASED CONCENTRATION: 1
TINGLING: 1
SINUS PAIN: 1
DIFFICULTY URINATING: 0
COUGH DISTURBING SLEEP: 0
MUSCLE CRAMPS: 1
SMELL DISTURBANCE: 0
SINUS CONGESTION: 1
HOARSE VOICE: 0
MYALGIAS: 1
NECK PAIN: 1
SORE THROAT: 1
SNORES LOUDLY: 1
EYE PAIN: 0
SKIN CHANGES: 0
FLANK PAIN: 0
DOUBLE VISION: 0
EYE REDNESS: 0
BRUISES/BLEEDS EASILY: 1
MUSCLE WEAKNESS: 0
LOSS OF CONSCIOUSNESS: 0
INSOMNIA: 1
HEMOPTYSIS: 0

## 2019-10-22 ASSESSMENT — ANXIETY QUESTIONNAIRES
GAD7 TOTAL SCORE: 2
7. FEELING AFRAID AS IF SOMETHING AWFUL MIGHT HAPPEN: NOT AT ALL
6. BECOMING EASILY ANNOYED OR IRRITABLE: SEVERAL DAYS
GAD7 TOTAL SCORE: 2
7. FEELING AFRAID AS IF SOMETHING AWFUL MIGHT HAPPEN: NOT AT ALL
2. NOT BEING ABLE TO STOP OR CONTROL WORRYING: NOT AT ALL
3. WORRYING TOO MUCH ABOUT DIFFERENT THINGS: NOT AT ALL
5. BEING SO RESTLESS THAT IT IS HARD TO SIT STILL: NOT AT ALL
4. TROUBLE RELAXING: SEVERAL DAYS
1. FEELING NERVOUS, ANXIOUS, OR ON EDGE: NOT AT ALL

## 2019-10-24 ENCOUNTER — OFFICE VISIT (OUTPATIENT)
Dept: OBGYN | Facility: CLINIC | Age: 39
End: 2019-10-24
Attending: OBSTETRICS & GYNECOLOGY
Payer: COMMERCIAL

## 2019-10-24 VITALS
HEIGHT: 65 IN | DIASTOLIC BLOOD PRESSURE: 89 MMHG | HEART RATE: 73 BPM | SYSTOLIC BLOOD PRESSURE: 129 MMHG | BODY MASS INDEX: 43.28 KG/M2 | WEIGHT: 259.8 LBS

## 2019-10-24 DIAGNOSIS — Z12.4 SCREENING FOR MALIGNANT NEOPLASM OF CERVIX: ICD-10-CM

## 2019-10-24 DIAGNOSIS — Z01.419 ENCOUNTER FOR GYNECOLOGICAL EXAMINATION WITHOUT ABNORMAL FINDING: Primary | ICD-10-CM

## 2019-10-24 PROCEDURE — 87624 HPV HI-RISK TYP POOLED RSLT: CPT | Performed by: OBSTETRICS & GYNECOLOGY

## 2019-10-24 PROCEDURE — G0463 HOSPITAL OUTPT CLINIC VISIT: HCPCS | Mod: ZF

## 2019-10-24 PROCEDURE — G0145 SCR C/V CYTO,THINLAYER,RESCR: HCPCS | Performed by: OBSTETRICS & GYNECOLOGY

## 2019-10-24 ASSESSMENT — ANXIETY QUESTIONNAIRES
6. BECOMING EASILY ANNOYED OR IRRITABLE: SEVERAL DAYS
7. FEELING AFRAID AS IF SOMETHING AWFUL MIGHT HAPPEN: NOT AT ALL
GAD7 TOTAL SCORE: 3
3. WORRYING TOO MUCH ABOUT DIFFERENT THINGS: SEVERAL DAYS
1. FEELING NERVOUS, ANXIOUS, OR ON EDGE: SEVERAL DAYS
2. NOT BEING ABLE TO STOP OR CONTROL WORRYING: NOT AT ALL
5. BEING SO RESTLESS THAT IT IS HARD TO SIT STILL: NOT AT ALL

## 2019-10-24 ASSESSMENT — PATIENT HEALTH QUESTIONNAIRE - PHQ9
SUM OF ALL RESPONSES TO PHQ QUESTIONS 1-9: 4
5. POOR APPETITE OR OVEREATING: NOT AT ALL

## 2019-10-24 ASSESSMENT — MIFFLIN-ST. JEOR: SCORE: 1859.33

## 2019-10-24 NOTE — LETTER
10/24/2019       RE: Steffi Hernandez  [address] [facility] [address]     Dear Colleague,    Thank you for referring your patient, Steffi Hernandez, to the [facility] at [facility] Please see a copy of my visit note below.    Annual Well Woman Exam  10/24/19    Reason for visit: Annual exam    HPI: Patient is a 38 yo nulligravid female who presents today for annual exam.  Patient has no concerns today.  Last year patient was started on OCP for contraception as plans to initiate Accutane.  Patient did go on Accutane but then had to go off of it because of concerns of liver damage.  Her partner Tim, also went through with a vasectomy in March this year and had her post-vasectomy semen analysis which she states was effective.  Patient states she has not noticed much change to her cycles or cramping with the OCP and now that Tim has had a vasectomy and she is no longer using Accutane, she is wondering if ok to go off of OCP now.  Otherwise, no other specific OB/GYN concerns today.    Past OB/GYN History:  Nulligravid, no desires for future childbearing  Menses: Regular every month.  Lasts 4-6 days, fairly heavy flow, does get some pretty bad cramping assisted with Midol, especially on day 2.  Denies intermenstrual bleeding.  No STI history  History of female and male partners, currently with male partner Tim, they just moved in together in a house they bought in [place]  Contraception: OCP and Vasectomy  Pap smear history: 10/2018 had NILM, other HR HPV positive pap, repeat cotesting today  No concerning discharge, No dyspareunia  Declines STI Screening today    Past Medical History:   Diagnosis Date     Abnormal Pap smear     Don't remember when it was and follow up clear     Allergic rhinitis, cause unspecified      Obesity      Obstructive sleep apnea      Other acne      Sleep apnea     No treatment at this time.     Uncomplicated asthma      Borderline - exercise induced     Unspecified urinary incontinence      Past Surgical History:   Procedure Laterality Date     BIOPSY      Lump in breast - diagnosis was fatty tumor     C NONSPECIFIC PROCEDURE      Urology surgery for night time bed wetting at age 6.     GENITOURINARY SURGERY      Urethrotomy     SEPTOPLASTY, TURBINOPLASTY, COMBINED N/A 2016    Procedure: COMBINED SEPTOPLASTY, TURBINOPLASTY;  Surgeon: Baldev Perez MD;  Location:  OR     montelukast (SINGULAIR) 10 MG tablet, Take 1 tablet (10 mg) by mouth At Bedtime INDICATION: TO TREAT ALLERGIC RHINITIS  norgestimate-ethinyl estradiol (ORTHO-CYCLEN/SPRINTEC) 0.25-35 MG-MCG tablet, Take 1 tablet by mouth daily  [DISCONTINUED] Ferrous Sulfate (SPATONE PUR-ABSORB IRON) 5 MG/20ML LIQD, Take 1 packet by mouth 2 times daily (before meals). INDICATION: TO CORRECT IRON DEFICIENCY - PLEASE TAKE WITH EMERGEN-C MIXED IN 8  8 OZ OF WATER    No current facility-administered medications on file prior to visit.     Allergies   Allergen Reactions     Animal Dander      Cats      Dogs      Rabbit Protein      Seasonal Allergies      Social History     Tobacco Use     Smoking status: Never Smoker     Smokeless tobacco: Never Used   Substance Use Topics     Alcohol use: Yes     Comment: 6 drinks weekly     Drug use: No     Family History   Problem Relation Age of Onset     Cerebrovascular Disease Father         x2     Hypertension Father      Seizure Disorder Father      Substance Abuse Father      Diabetes Paternal Grandfather      Cancer Paternal Grandfather           from throat cancer. Also had melanoma.     Blood Disease Paternal Grandfather         B12 DEFICIENCY - WAS ON B12 SHOTS     Substance Abuse Paternal Grandfather      Obesity Paternal Grandfather      Cancer Maternal Grandmother          of colon cancer     Colon Cancer Maternal Grandmother      Substance Abuse Maternal Grandmother      Cerebrovascular Disease  Maternal Grandfather      Heart Disease Maternal Grandfather               Substance Abuse Maternal Grandfather      Neurologic Disorder Sister         Epilepsy diagnosed      Neurologic Disorder Sister         Epilepsy diagnosed      Mental Illness Sister      Substance Abuse Sister      Substance Abuse Paternal Grandmother      Melanoma No family hx of      Skin Cancer No family hx of      Liver Disease No family hx of      ROS:  Answers for HPI/ROS submitted by the patient on 10/22/2019, reviewed today and unchanged  TRISHA 7 TOTAL SCORE: 2  General Symptoms: No  Skin Symptoms: Yes  HENT Symptoms: Yes  EYE SYMPTOMS: Yes  HEART SYMPTOMS: No  LUNG SYMPTOMS: Yes  INTESTINAL SYMPTOMS: No  URINARY SYMPTOMS: Yes  GYNECOLOGIC SYMPTOMS: No  BREAST SYMPTOMS: No  SKELETAL SYMPTOMS: Yes  BLOOD SYMPTOMS: Yes  NERVOUS SYSTEM SYMPTOMS: Yes  MENTAL HEALTH SYMPTOMS: Yes  Changes in hair: No  Changes in moles/birth marks: No  Itching: No  Rashes: No  Changes in nails: No  Acne: Yes  Hair in places you don't want it: No  Change in facial hair: No  Warts: No  Non-healing sores: No  Scarring: No  Flaking of skin: No  Color changes of hands/feet in cold : No  Sun sensitivity: No  Skin thickening: No  Ear pain: No  Ear discharge: No  Hearing loss: No  Tinnitus: No  Nosebleeds: No  Congestion: Yes  Sinus pain: Yes  Trouble swallowing: No   Voice hoarseness: No  Mouth sores: No  Sore throat: Yes  Tooth pain: No  Gum tenderness: No  Bleeding gums: No  Change in taste: No  Change in sense of smell: No  Dry mouth: No  Hearing aid used: No  Neck lump: No  Eye pain: No  Vision loss: No  Dry eyes: No  Watery eyes: No  Eye bulging: No  Double vision: No  Flashing of lights: No  Spots: Yes  Floaters: Yes  Redness: No  Crossed eyes: No  Tunnel Vision: No  Yellowing of eyes: No  Eye irritation: No  Cough: Yes  Sputum or phlegm: No  Coughing up blood: No  Difficulty breating or shortness of breath: No  Snoring: Yes  Wheezing:  "No  Difficulty breathing on exertion: No  Nighttime Cough: No  Difficulty breathing when lying flat: No  Trouble holding urine or incontinence: Yes  Pain or burning: No  Trouble starting or stopping: No  Increased frequency of urination: No  Blood in urine: No  Decreased frequency of urination: No  Frequent nighttime urination: No  Flank pain: No  Difficulty emptying bladder: No  Back pain: Yes  Muscle aches: Yes  Neck pain: Yes  Swollen joints: No  Joint pain: No  Bone pain: No  Muscle cramps: Yes  Muscle weakness: No  Joint stiffness: No  Bone fracture: No  Anemia: No  Swollen glands: No  Easy bleeding or bruising: Yes  Edema or swelling: No  Trouble with coordination: No  Dizziness or trouble with balance: Yes  Fainting or black-out spells: No  Memory loss: No  Headache: Yes  Seizures: No  Speech problems: No  Tingling: Yes  Tremor: No  Weakness: No  Difficulty walking: No  Paralysis: No  Numbness: Yes  Nervous or Anxious: Yes  Depression: Yes  Trouble sleeping: Yes  Trouble thinking or concentrating: Yes  Mood changes: Yes  Panic attacks: No    O:  /89   Pulse 73   Ht 1.651 m (5' 5\")   Wt 117.8 kg (259 lb 12.8 oz)   LMP 10/07/2019   BMI 43.23 kg/m        General: NAD, A&Ox3  Neck: No thyromegaly, No thyroid nodules appreciated  Lungs: CTA B/L  CV: RRR  Breasts: Symmetrical, No lymphadenopathy, skin changes, nipple discharge or nodules appreciated bilaterally  Abdomen: Soft, NT, ND  Genitourinary:   External Genitalia:  General appearance; normal, Hair distribution; normal, Lesions absent  Urethral Meatus:  Size normal, Location normal, Lesions absent  Urethra:  Fullness absent, Masses absent  Bladder:  Fullness absent, Masses absent, Tenderness absent  Vagina:  General appearance normal, Estrogen effect normal, Discharge absent  Cervix:  General appearance normal, Lesions absent, Discharge absent, Tenderness absent, Enlargement absent  Uterus:  Size normal, Position normal, Masses absent, Tenderness " absent  Adenexa:  Masses absent, Tenderness absent, Enlargement absent     A/P: 38 yo nulligravid female presents for annual well woman exam  1) Normal breast and pelvic exam  2) Screening for malignant neoplasm of cervix: Pap with cotesting today.  If any abnormalities will need colposcopy.  Discussed with patient today.  Will notify patient of results and plan for follow-up as appropriate.  3) Contraception: Patient with monogamous partner with vasectomy.  Discussed if not feeling benefit from menstrual perspective with current OCP, ok to stop from a contraception standpoint.  Discussed monitoring her cycles when she stops OCP and if any abnormalities to contact us.  She understands and is agreeable with this plan.    Anisha Diana MD

## 2019-10-24 NOTE — PROGRESS NOTES
Annual Well Woman Exam  10/24/19    Reason for visit: Annual exam    HPI: Patient is a 38 yo nulligravid female who presents today for annual exam.  Patient has no concerns today.  Last year patient was started on OCP for contraception as plans to initiate Accutane.  Patient did go on Accutane but then had to go off of it because of concerns of liver damage.  Her partner Tim, also went through with a vasectomy in March this year and had her post-vasectomy semen analysis which she states was effective.  Patient states she has not noticed much change to her cycles or cramping with the OCP and now that Tim has had a vasectomy and she is no longer using Accutane, she is wondering if ok to go off of OCP now.  Otherwise, no other specific OB/GYN concerns today.    Past OB/GYN History:  Nulligravid, no desires for future childbearing  Menses: Regular every month.  Lasts 4-6 days, fairly heavy flow, does get some pretty bad cramping assisted with Midol, especially on day 2.  Denies intermenstrual bleeding.  No STI history  History of female and male partners, currently with male partner Tim, they just moved in together in a house they bought in [place]  Contraception: OCP and Vasectomy  Pap smear history: 10/2018 had NILM, other HR HPV positive pap, repeat cotesting today  No concerning discharge, No dyspareunia  Declines STI Screening today    Past Medical History:   Diagnosis Date     Abnormal Pap smear     Don't remember when it was and follow up clear     Allergic rhinitis, cause unspecified      Obesity      Obstructive sleep apnea      Other acne      Sleep apnea     No treatment at this time.     Uncomplicated asthma     Borderline - exercise induced     Unspecified urinary incontinence      Past Surgical History:   Procedure Laterality Date     BIOPSY  2011    Lump in breast - diagnosis was fatty tumor     C NONSPECIFIC PROCEDURE      Urology surgery for night time bed wetting at age 6.     GENITOURINARY  SURGERY  1986    Urethrotomy     SEPTOPLASTY, TURBINOPLASTY, COMBINED N/A 2016    Procedure: COMBINED SEPTOPLASTY, TURBINOPLASTY;  Surgeon: Baldev Perez MD;  Location:  OR     montelukast (SINGULAIR) 10 MG tablet, Take 1 tablet (10 mg) by mouth At Bedtime INDICATION: TO TREAT ALLERGIC RHINITIS  norgestimate-ethinyl estradiol (ORTHO-CYCLEN/SPRINTEC) 0.25-35 MG-MCG tablet, Take 1 tablet by mouth daily  [DISCONTINUED] Ferrous Sulfate (SPATONE PUR-ABSORB IRON) 5 MG/20ML LIQD, Take 1 packet by mouth 2 times daily (before meals). INDICATION: TO CORRECT IRON DEFICIENCY - PLEASE TAKE WITH EMERGEN-C MIXED IN 8  8 OZ OF WATER    No current facility-administered medications on file prior to visit.     Allergies   Allergen Reactions     Animal Dander      Cats      Dogs      Rabbit Protein      Seasonal Allergies      Social History     Tobacco Use     Smoking status: Never Smoker     Smokeless tobacco: Never Used   Substance Use Topics     Alcohol use: Yes     Comment: 6 drinks weekly     Drug use: No     Family History   Problem Relation Age of Onset     Cerebrovascular Disease Father         x2     Hypertension Father      Seizure Disorder Father      Substance Abuse Father      Diabetes Paternal Grandfather      Cancer Paternal Grandfather           from throat cancer. Also had melanoma.     Blood Disease Paternal Grandfather         B12 DEFICIENCY - WAS ON B12 SHOTS     Substance Abuse Paternal Grandfather      Obesity Paternal Grandfather      Cancer Maternal Grandmother          of colon cancer     Colon Cancer Maternal Grandmother      Substance Abuse Maternal Grandmother      Cerebrovascular Disease Maternal Grandfather      Heart Disease Maternal Grandfather               Substance Abuse Maternal Grandfather      Neurologic Disorder Sister         Epilepsy diagnosed      Neurologic Disorder Sister         Epilepsy diagnosed      Mental Illness Sister       Substance Abuse Sister      Substance Abuse Paternal Grandmother      Melanoma No family hx of      Skin Cancer No family hx of      Liver Disease No family hx of      ROS:  Answers for HPI/ROS submitted by the patient on 10/22/2019, reviewed today and unchanged  TRISHA 7 TOTAL SCORE: 2  General Symptoms: No  Skin Symptoms: Yes  HENT Symptoms: Yes  EYE SYMPTOMS: Yes  HEART SYMPTOMS: No  LUNG SYMPTOMS: Yes  INTESTINAL SYMPTOMS: No  URINARY SYMPTOMS: Yes  GYNECOLOGIC SYMPTOMS: No  BREAST SYMPTOMS: No  SKELETAL SYMPTOMS: Yes  BLOOD SYMPTOMS: Yes  NERVOUS SYSTEM SYMPTOMS: Yes  MENTAL HEALTH SYMPTOMS: Yes  Changes in hair: No  Changes in moles/birth marks: No  Itching: No  Rashes: No  Changes in nails: No  Acne: Yes  Hair in places you don't want it: No  Change in facial hair: No  Warts: No  Non-healing sores: No  Scarring: No  Flaking of skin: No  Color changes of hands/feet in cold : No  Sun sensitivity: No  Skin thickening: No  Ear pain: No  Ear discharge: No  Hearing loss: No  Tinnitus: No  Nosebleeds: No  Congestion: Yes  Sinus pain: Yes  Trouble swallowing: No   Voice hoarseness: No  Mouth sores: No  Sore throat: Yes  Tooth pain: No  Gum tenderness: No  Bleeding gums: No  Change in taste: No  Change in sense of smell: No  Dry mouth: No  Hearing aid used: No  Neck lump: No  Eye pain: No  Vision loss: No  Dry eyes: No  Watery eyes: No  Eye bulging: No  Double vision: No  Flashing of lights: No  Spots: Yes  Floaters: Yes  Redness: No  Crossed eyes: No  Tunnel Vision: No  Yellowing of eyes: No  Eye irritation: No  Cough: Yes  Sputum or phlegm: No  Coughing up blood: No  Difficulty breating or shortness of breath: No  Snoring: Yes  Wheezing: No  Difficulty breathing on exertion: No  Nighttime Cough: No  Difficulty breathing when lying flat: No  Trouble holding urine or incontinence: Yes  Pain or burning: No  Trouble starting or stopping: No  Increased frequency of urination: No  Blood in urine: No  Decreased frequency of  "urination: No  Frequent nighttime urination: No  Flank pain: No  Difficulty emptying bladder: No  Back pain: Yes  Muscle aches: Yes  Neck pain: Yes  Swollen joints: No  Joint pain: No  Bone pain: No  Muscle cramps: Yes  Muscle weakness: No  Joint stiffness: No  Bone fracture: No  Anemia: No  Swollen glands: No  Easy bleeding or bruising: Yes  Edema or swelling: No  Trouble with coordination: No  Dizziness or trouble with balance: Yes  Fainting or black-out spells: No  Memory loss: No  Headache: Yes  Seizures: No  Speech problems: No  Tingling: Yes  Tremor: No  Weakness: No  Difficulty walking: No  Paralysis: No  Numbness: Yes  Nervous or Anxious: Yes  Depression: Yes  Trouble sleeping: Yes  Trouble thinking or concentrating: Yes  Mood changes: Yes  Panic attacks: No    O:  /89   Pulse 73   Ht 1.651 m (5' 5\")   Wt 117.8 kg (259 lb 12.8 oz)   LMP 10/07/2019   BMI 43.23 kg/m       General: NAD, A&Ox3  Neck: No thyromegaly, No thyroid nodules appreciated  Lungs: CTA B/L  CV: RRR  Breasts: Symmetrical, No lymphadenopathy, skin changes, nipple discharge or nodules appreciated bilaterally  Abdomen: Soft, NT, ND  Genitourinary:   External Genitalia:  General appearance; normal, Hair distribution; normal, Lesions absent  Urethral Meatus:  Size normal, Location normal, Lesions absent  Urethra:  Fullness absent, Masses absent  Bladder:  Fullness absent, Masses absent, Tenderness absent  Vagina:  General appearance normal, Estrogen effect normal, Discharge absent  Cervix:  General appearance normal, Lesions absent, Discharge absent, Tenderness absent, Enlargement absent  Uterus:  Size normal, Position normal, Masses absent, Tenderness absent  Adenexa:  Masses absent, Tenderness absent, Enlargement absent     A/P: 40 yo nulligravid female presents for annual well woman exam  1) Normal breast and pelvic exam  2) Screening for malignant neoplasm of cervix: Pap with cotesting today.  If any abnormalities will need " colposcopy.  Discussed with patient today.  Will notify patient of results and plan for follow-up as appropriate.  3) Contraception: Patient with monogamous partner with vasectomy.  Discussed if not feeling benefit from menstrual perspective with current OCP, ok to stop from a contraception standpoint.  Discussed monitoring her cycles when she stops OCP and if any abnormalities to contact us.  She understands and is agreeable with this plan.    Anisha Diana MD

## 2019-10-29 LAB
COPATH REPORT: NORMAL
PAP: NORMAL

## 2019-12-09 ENCOUNTER — OFFICE VISIT (OUTPATIENT)
Dept: DERMATOLOGY | Facility: CLINIC | Age: 39
End: 2019-12-09
Payer: COMMERCIAL

## 2019-12-09 DIAGNOSIS — L73.2 HIDRADENITIS SUPPURATIVA: Primary | ICD-10-CM

## 2019-12-09 RX ORDER — CLINDAMYCIN PHOSPHATE 10 MG/G
GEL TOPICAL 2 TIMES DAILY
Qty: 60 G | Refills: 11 | Status: SHIPPED | OUTPATIENT
Start: 2019-12-09 | End: 2021-03-16

## 2019-12-09 ASSESSMENT — PAIN SCALES - GENERAL: PAINLEVEL: NO PAIN (0)

## 2019-12-09 NOTE — LETTER
12/9/2019      RE: Steffi Hernandez  [address] [facility] [address]       [facility] Dermatology Note      Dermatology Problem List:  1. Folliculitis, suspect early stage of Hidradenitis Suppurativa   - Clindamycin 1% gel BID, BPO 5% wash in the shower  s/p isotretinoin 80 mg started 11/13/18 ended 2/15/19  Prev treatment: Doxycycline 100mg PO BID, Keflex 500mg po BID  -Bacterial culture on the central abdomen - positive for heavy growth of staph lugdunensis  2. Acne vulgaris   -s/p isotretinoin 80 mg started 11/13/18, completed 3 months  -Previous tx: various topical medications, numerous oral antibiotics, spironolactone, OCPs  3. Retinoid dermatitis  - triamcinolone 0.1% ointment     Encounter Date: Dec 9, 2019    CC:  Chief Complaint   Patient presents with     Acne     Steffi is here today for Acne follow up. Steffi notes she had to to stop Accutane because of her Liver health, she would like to discuss treatment plan.      History of Present Illness:  Ms. Steffi Hernandez is a 39 year old female who presents as a follow-up for acne. The patient was last seen in the dermatology clinic on 2/15/19 when she discontinued Accutane after three months due to abnormal labs, AST 93 and ALT 86. At that visit, she also started triamcinolone 0.1% ointment for retinoid dermatitis of the abdomen. She subsequently saw primary care when she was referred to a hepatologist. Her labs were retested on 3/1/19 and were further elevated  and . Upon follow up on 4/1/19 with Dr. David, he recommended to discontinue Accutane indefinitely. Since then she has had normal liver enzymes on repeat testing.     Today she reports that she has not been using any medication since her last visit in the dermatology clinic. Today is a good day in terms of the severity of her acne. Denies deep, painful cystic acne. She notes that topicals have been helpful at speeding up the resolution of her acne but has  not been able to prevent acne from emerging in the first place. She experienced similar results with antibiotics as a treatement for her acne. She has mild breakouts that coincide with her menstrual cycle. She uses Cetaphil as a cleanser.     Otherwise she is feeling well, without additional skin concerns at this time.      Past Medical History:   Patient Active Problem List   Diagnosis     Fatigue     Heavy menses     Memory difficulty     Allergic rhinosinusitis     CARDIOVASCULAR SCREENING; LDL GOAL LESS THAN 160     Acne     Acute maxillary sinusitis     Vitamin B12 deficiency without anemia     Vitamin D deficiency     Ascorbic acid deficiency     Iron deficiency     Moderate major depression (H)     Hirsutism     Pyridoxine deficiency     Dysmenorrhea     Family history of diabetes mellitus     Major depressive disorder, recurrent episode, moderate (HCC)     Morbid obesity, unspecified obesity type (H)     Chronic fatigue     Menorrhagia with regular cycle     Allergic rhinitis, unspecified allergic rhinitis type     LANDRY (obstructive sleep apnea)     Morbid obesity due to excess calories (H)     Sleep apnea, obstructive     Deviated nasal septum     Encounter for long-term (current) use of high-risk medication     Obesity     Obstructive sleep apnea     Past Medical History:   Diagnosis Date     Abnormal Pap smear     Don't remember when it was and follow up clear     Allergic rhinitis, cause unspecified      Obesity      Obstructive sleep apnea      Other acne      Sleep apnea     No treatment at this time.     Uncomplicated asthma     Borderline - exercise induced     Unspecified urinary incontinence      Past Surgical History:   Procedure Laterality Date     BIOPSY  2011    Lump in breast - diagnosis was fatty tumor     C NONSPECIFIC PROCEDURE      Urology surgery for night time bed wetting at age 6.     GENITOURINARY SURGERY  1986    Urethrotomy     SEPTOPLASTY, TURBINOPLASTY, COMBINED N/A 5/18/2016     Procedure: COMBINED SEPTOPLASTY, TURBINOPLASTY;  Surgeon: Baldev Perez MD;  Location: [facility] OR       Social History:   reports that she has never smoked. She has never used smokeless tobacco. She reports current alcohol use. She reports that she does not use drugs.    Family History:  Family History   Problem Relation Age of Onset     Cerebrovascular Disease Father         x2     Hypertension Father      Seizure Disorder Father      Substance Abuse Father      Diabetes Paternal Grandfather      Cancer Paternal Grandfather           from throat cancer. Also had melanoma.     Blood Disease Paternal Grandfather         B12 DEFICIENCY - WAS ON B12 SHOTS     Substance Abuse Paternal Grandfather      Obesity Paternal Grandfather      Cancer Maternal Grandmother          of colon cancer     Colon Cancer Maternal Grandmother      Substance Abuse Maternal Grandmother      Cerebrovascular Disease Maternal Grandfather      Heart Disease Maternal Grandfather               Substance Abuse Maternal Grandfather      Neurologic Disorder Sister         Epilepsy diagnosed      Neurologic Disorder Sister         Epilepsy diagnosed      Mental Illness Sister      Substance Abuse Sister      Substance Abuse Paternal Grandmother      Melanoma No family hx of      Skin Cancer No family hx of      Liver Disease No family hx of        Medications:  Current Outpatient Medications   Medication Sig Dispense Refill     montelukast (SINGULAIR) 10 MG tablet Take 1 tablet (10 mg) by mouth At Bedtime INDICATION: TO TREAT ALLERGIC RHINITIS 30 tablet 5     norgestimate-ethinyl estradiol (ORTHO-CYCLEN/SPRINTEC) 0.25-35 MG-MCG tablet Take 1 tablet by mouth daily (Patient not taking: Reported on 2019) 84 tablet 3       Allergies   Allergen Reactions     Animal Dander      Cats      Dogs      Rabbit Protein      Seasonal Allergies        Review of Systems:  -Constitutional: The patient denies fatigue,  fevers, chills, unintended weight loss, and night sweats.  -Skin: As above in HPI. No additional skin concerns.  -Neuro: no HA or vision changes  -GI: No nausea, blood in stool, diarrhea, hx of IBD  -Psych: no depression/anxiety, mood changes, or sleep problems   -Musculoskeletal: no joint or muscle pain or swelling   -Heme/Lymph: no concerning bumps, no bleeding problems    Physical exam:  Vitals: There were no vitals taken for this visit.  GEN: This is a well developed, well-nourished female in no acute distress, in a pleasant mood.    SKIN: Waist-up skin excluding the back, which includes the head/face, neck, both arms, chest, abdomen, digits and/or nails was examined.    - In bilateral axilla few small pustules  - Open comedone on the lower portion of right axilla  - Hyperemic macules on the central waist,  No inflamatory papules or pustules noted  - No acne noted to the face.   - No other lesions of concern on areas examined.       Impression/Plan:  1. Hidradenitis Suppurativa, s/p 3 months of isotretinoin, stopped due to abnormal liver function. Mild presentation today.   Start Clindamycin 1% gel BID to the affected area  Start BPO 5% wash daily in the shower to the affected area  Photodocumentation was obtained today  Pt has used oral antibiotics without resolution but control. Discussed briefly other options including a repeat course of oral antibiotics, spironolactone or Humira.       Follow-up in 3 months, earlier for new or changing lesions.       Staff Involved:  Scribe/Staff    Scribe Disclosure:   I, Christopher Patton, am serving as a scribe to document services personally performed by Judy Brown PA-C, based on data collection and the provider's statements to me.    Provider Disclosure:   The documentation recorded by the scribe accurately reflects the services I personally performed and the decisions made by me.    All risks, benefits and alternatives were discussed with patient.  Patient is in  agreement and understands the assessment and plan.  All questions were answered.  Sun Screen Education was given.   Return to Clinic in 3 months or sooner as needed.   Judy Brown PA-C   [facility]           Judy Brown PA-C

## 2019-12-09 NOTE — PROGRESS NOTES
[facility] Dermatology Note      Dermatology Problem List:  1. Folliculitis, suspect early stage of Hidradenitis Suppurativa   - Clindamycin 1% gel BID, BPO 5% wash in the shower  s/p isotretinoin 80 mg started 11/13/18 ended 2/15/19  Prev treatment: Doxycycline 100mg PO BID, Keflex 500mg po BID  -Bacterial culture on the central abdomen - positive for heavy growth of staph lugdunensis  2. Acne vulgaris   -s/p isotretinoin 80 mg started 11/13/18, completed 3 months  -Previous tx: various topical medications, numerous oral antibiotics, spironolactone, OCPs  3. Retinoid dermatitis  - triamcinolone 0.1% ointment     Encounter Date: Dec 9, 2019    CC:  Chief Complaint   Patient presents with     Acne     Steffi is here today for Acne follow up. Steffi notes she had to to stop Accutane because of her Liver health, she would like to discuss treatment plan.      History of Present Illness:  Ms. Steffi Hernandez is a 39 year old female who presents as a follow-up for acne. The patient was last seen in the dermatology clinic on 2/15/19 when she discontinued Accutane after three months due to abnormal labs, AST 93 and ALT 86. At that visit, she also started triamcinolone 0.1% ointment for retinoid dermatitis of the abdomen. She subsequently saw primary care when she was referred to a hepatologist. Her labs were retested on 3/1/19 and were further elevated  and . Upon follow up on 4/1/19 with Dr. David, he recommended to discontinue Accutane indefinitely. Since then she has had normal liver enzymes on repeat testing.     Today she reports that she has not been using any medication since her last visit in the dermatology clinic. Today is a good day in terms of the severity of her acne. Denies deep, painful cystic acne. She notes that topicals have been helpful at speeding up the resolution of her acne but has not been able to prevent acne from emerging in the first place. She experienced  similar results with antibiotics as a treatement for her acne. She has mild breakouts that coincide with her menstrual cycle. She uses Cetaphil as a cleanser.     Otherwise she is feeling well, without additional skin concerns at this time.      Past Medical History:   Patient Active Problem List   Diagnosis     Fatigue     Heavy menses     Memory difficulty     Allergic rhinosinusitis     CARDIOVASCULAR SCREENING; LDL GOAL LESS THAN 160     Acne     Acute maxillary sinusitis     Vitamin B12 deficiency without anemia     Vitamin D deficiency     Ascorbic acid deficiency     Iron deficiency     Moderate major depression (H)     Hirsutism     Pyridoxine deficiency     Dysmenorrhea     Family history of diabetes mellitus     Major depressive disorder, recurrent episode, moderate (HCC)     Morbid obesity, unspecified obesity type (H)     Chronic fatigue     Menorrhagia with regular cycle     Allergic rhinitis, unspecified allergic rhinitis type     LANDRY (obstructive sleep apnea)     Morbid obesity due to excess calories (H)     Sleep apnea, obstructive     Deviated nasal septum     Encounter for long-term (current) use of high-risk medication     Obesity     Obstructive sleep apnea     Past Medical History:   Diagnosis Date     Abnormal Pap smear     Don't remember when it was and follow up clear     Allergic rhinitis, cause unspecified      Obesity      Obstructive sleep apnea      Other acne      Sleep apnea     No treatment at this time.     Uncomplicated asthma     Borderline - exercise induced     Unspecified urinary incontinence      Past Surgical History:   Procedure Laterality Date     BIOPSY  2011    Lump in breast - diagnosis was fatty tumor     C NONSPECIFIC PROCEDURE      Urology surgery for night time bed wetting at age 6.     GENITOURINARY SURGERY  1986    Urethrotomy     SEPTOPLASTY, TURBINOPLASTY, COMBINED N/A 5/18/2016    Procedure: COMBINED SEPTOPLASTY, TURBINOPLASTY;  Surgeon: Baldev Perez MD;   Location: [facility] OR       Social History:   reports that she has never smoked. She has never used smokeless tobacco. She reports current alcohol use. She reports that she does not use drugs.    Family History:  Family History   Problem Relation Age of Onset     Cerebrovascular Disease Father         x2     Hypertension Father      Seizure Disorder Father      Substance Abuse Father      Diabetes Paternal Grandfather      Cancer Paternal Grandfather           from throat cancer. Also had melanoma.     Blood Disease Paternal Grandfather         B12 DEFICIENCY - WAS ON B12 SHOTS     Substance Abuse Paternal Grandfather      Obesity Paternal Grandfather      Cancer Maternal Grandmother          of colon cancer     Colon Cancer Maternal Grandmother      Substance Abuse Maternal Grandmother      Cerebrovascular Disease Maternal Grandfather      Heart Disease Maternal Grandfather               Substance Abuse Maternal Grandfather      Neurologic Disorder Sister         Epilepsy diagnosed      Neurologic Disorder Sister         Epilepsy diagnosed      Mental Illness Sister      Substance Abuse Sister      Substance Abuse Paternal Grandmother      Melanoma No family hx of      Skin Cancer No family hx of      Liver Disease No family hx of        Medications:  Current Outpatient Medications   Medication Sig Dispense Refill     montelukast (SINGULAIR) 10 MG tablet Take 1 tablet (10 mg) by mouth At Bedtime INDICATION: TO TREAT ALLERGIC RHINITIS 30 tablet 5     norgestimate-ethinyl estradiol (ORTHO-CYCLEN/SPRINTEC) 0.25-35 MG-MCG tablet Take 1 tablet by mouth daily (Patient not taking: Reported on 2019) 84 tablet 3       Allergies   Allergen Reactions     Animal Dander      Cats      Dogs      Rabbit Protein      Seasonal Allergies        Review of Systems:  -Constitutional: The patient denies fatigue, fevers, chills, unintended weight loss, and night sweats.  -Skin: As above in HPI.  No additional skin concerns.  -Neuro: no HA or vision changes  -GI: No nausea, blood in stool, diarrhea, hx of IBD  -Psych: no depression/anxiety, mood changes, or sleep problems   -Musculoskeletal: no joint or muscle pain or swelling   -Heme/Lymph: no concerning bumps, no bleeding problems    Physical exam:  Vitals: There were no vitals taken for this visit.  GEN: This is a well developed, well-nourished female in no acute distress, in a pleasant mood.    SKIN: Waist-up skin excluding the back, which includes the head/face, neck, both arms, chest, abdomen, digits and/or nails was examined.    - In bilateral axilla few small pustules  - Open comedone on the lower portion of right axilla  - Hyperemic macules on the central waist,  No inflamatory papules or pustules noted  - No acne noted to the face.   - No other lesions of concern on areas examined.       Impression/Plan:  1. Hidradenitis Suppurativa, s/p 3 months of isotretinoin, stopped due to abnormal liver function. Mild presentation today.   Start Clindamycin 1% gel BID to the affected area  Start BPO 5% wash daily in the shower to the affected area  Photodocumentation was obtained today  Pt has used oral antibiotics without resolution but control. Discussed briefly other options including a repeat course of oral antibiotics, spironolactone or Humira.       Follow-up in 3 months, earlier for new or changing lesions.       Staff Involved:  Scribe/Staff    Scribe Disclosure:   I, Christopher Patton, am serving as a scribe to document services personally performed by Judy Brown PA-C, based on data collection and the provider's statements to me.    Provider Disclosure:   The documentation recorded by the scribe accurately reflects the services I personally performed and the decisions made by me.    All risks, benefits and alternatives were discussed with patient.  Patient is in agreement and understands the assessment and plan.  All questions were answered.  Sun  Screen Education was given.   Return to Clinic in 3 months or sooner as needed.   Judy Brown PA-C   [facility]

## 2019-12-09 NOTE — LETTER
12/9/2019       RE: Steffi Hernandez  [address] [facility] [address]     Dear Colleague,    Thank you for referring your patient, Steffi Hernandez, to the [facility] DERMATOLOGY at [facility]. Please see a copy of my visit note below.    [facility] Dermatology Note      Dermatology Problem List:  1. Folliculitis, suspect early stage of Hidradenitis Suppurativa   - Clindamycin 1% gel BID, BPO 5% wash in the shower  s/p isotretinoin 80 mg started 11/13/18 ended 2/15/19  Prev treatment: Doxycycline 100mg PO BID, Keflex 500mg po BID  -Bacterial culture on the central abdomen - positive for heavy growth of staph lugdunensis  2. Acne vulgaris   -s/p isotretinoin 80 mg started 11/13/18, completed 3 months  -Previous tx: various topical medications, numerous oral antibiotics, spironolactone, OCPs  3. Retinoid dermatitis  - triamcinolone 0.1% ointment     Encounter Date: Dec 9, 2019    CC:  Chief Complaint   Patient presents with     Acne     Steffi is here today for Acne follow up. Steffi notes she had to to stop Accutane because of her Liver health, she would like to discuss treatment plan.      History of Present Illness:  Ms. Steffi Hernandez is a 39 year old female who presents as a follow-up for acne. The patient was last seen in the dermatology clinic on 2/15/19 when she discontinued Accutane after three months due to abnormal labs, AST 93 and ALT 86. At that visit, she also started triamcinolone 0.1% ointment for retinoid dermatitis of the abdomen. She subsequently saw primary care when she was referred to a hepatologist. Her labs were retested on 3/1/19 and were further elevated  and . Upon follow up on 4/1/19 with Dr. David, he recommended to discontinue Accutane indefinitely. Since then she has had normal liver enzymes on repeat testing.     Today she reports that she has not been using any medication since her last visit in the  dermatology clinic. Today is a good day in terms of the severity of her acne. Denies deep, painful cystic acne. She notes that topicals have been helpful at speeding up the resolution of her acne but has not been able to prevent acne from emerging in the first place. She experienced similar results with antibiotics as a treatement for her acne. She has mild breakouts that coincide with her menstrual cycle. She uses Cetaphil as a cleanser.     Otherwise she is feeling well, without additional skin concerns at this time.      Past Medical History:   Patient Active Problem List   Diagnosis     Fatigue     Heavy menses     Memory difficulty     Allergic rhinosinusitis     CARDIOVASCULAR SCREENING; LDL GOAL LESS THAN 160     Acne     Acute maxillary sinusitis     Vitamin B12 deficiency without anemia     Vitamin D deficiency     Ascorbic acid deficiency     Iron deficiency     Moderate major depression (H)     Hirsutism     Pyridoxine deficiency     Dysmenorrhea     Family history of diabetes mellitus     Major depressive disorder, recurrent episode, moderate (HCC)     Morbid obesity, unspecified obesity type (H)     Chronic fatigue     Menorrhagia with regular cycle     Allergic rhinitis, unspecified allergic rhinitis type     LANDRY (obstructive sleep apnea)     Morbid obesity due to excess calories (H)     Sleep apnea, obstructive     Deviated nasal septum     Encounter for long-term (current) use of high-risk medication     Obesity     Obstructive sleep apnea     Past Medical History:   Diagnosis Date     Abnormal Pap smear     Don't remember when it was and follow up clear     Allergic rhinitis, cause unspecified      Obesity      Obstructive sleep apnea      Other acne      Sleep apnea     No treatment at this time.     Uncomplicated asthma     Borderline - exercise induced     Unspecified urinary incontinence      Past Surgical History:   Procedure Laterality Date     BIOPSY  2011    Lump in breast - diagnosis was  fatty tumor     C NONSPECIFIC PROCEDURE      Urology surgery for night time bed wetting at age 6.     GENITOURINARY SURGERY  1986    Urethrotomy     SEPTOPLASTY, TURBINOPLASTY, COMBINED N/A 2016    Procedure: COMBINED SEPTOPLASTY, TURBINOPLASTY;  Surgeon: Baldev Perez MD;  Location: [facility] OR       Social History:   reports that she has never smoked. She has never used smokeless tobacco. She reports current alcohol use. She reports that she does not use drugs.    Family History:  Family History   Problem Relation Age of Onset     Cerebrovascular Disease Father         x2     Hypertension Father      Seizure Disorder Father      Substance Abuse Father      Diabetes Paternal Grandfather      Cancer Paternal Grandfather           from throat cancer. Also had melanoma.     Blood Disease Paternal Grandfather         B12 DEFICIENCY - WAS ON B12 SHOTS     Substance Abuse Paternal Grandfather      Obesity Paternal Grandfather      Cancer Maternal Grandmother          of colon cancer     Colon Cancer Maternal Grandmother      Substance Abuse Maternal Grandmother      Cerebrovascular Disease Maternal Grandfather      Heart Disease Maternal Grandfather               Substance Abuse Maternal Grandfather      Neurologic Disorder Sister         Epilepsy diagnosed      Neurologic Disorder Sister         Epilepsy diagnosed      Mental Illness Sister      Substance Abuse Sister      Substance Abuse Paternal Grandmother      Melanoma No family hx of      Skin Cancer No family hx of      Liver Disease No family hx of        Medications:  Current Outpatient Medications   Medication Sig Dispense Refill     montelukast (SINGULAIR) 10 MG tablet Take 1 tablet (10 mg) by mouth At Bedtime INDICATION: TO TREAT ALLERGIC RHINITIS 30 tablet 5     norgestimate-ethinyl estradiol (ORTHO-CYCLEN/SPRINTEC) 0.25-35 MG-MCG tablet Take 1 tablet by mouth daily (Patient not taking: Reported on 2019) 84  tablet 3       Allergies   Allergen Reactions     Animal Dander      Cats      Dogs      Rabbit Protein      Seasonal Allergies        Review of Systems:  -Constitutional: The patient denies fatigue, fevers, chills, unintended weight loss, and night sweats.  -Skin: As above in HPI. No additional skin concerns.  -Neuro: no HA or vision changes  -GI: No nausea, blood in stool, diarrhea, hx of IBD  -Psych: no depression/anxiety, mood changes, or sleep problems   -Musculoskeletal: no joint or muscle pain or swelling   -Heme/Lymph: no concerning bumps, no bleeding problems    Physical exam:  Vitals: There were no vitals taken for this visit.  GEN: This is a well developed, well-nourished female in no acute distress, in a pleasant mood.    SKIN: Waist-up skin excluding the back, which includes the head/face, neck, both arms, chest, abdomen, digits and/or nails was examined.    - In bilateral axilla few small pustules  - Open comedone on the lower portion of right axilla  - Hyperemic macules on the central waist,  No inflamatory papules or pustules noted  - No acne noted to the face.   - No other lesions of concern on areas examined.       Impression/Plan:  1. Hidradenitis Suppurativa, s/p 3 months of isotretinoin, stopped due to abnormal liver function. Mild presentation today.   Start Clindamycin 1% gel BID to the affected area  Start BPO 5% wash daily in the shower to the affected area  Photodocumentation was obtained today  Pt has used oral antibiotics without resolution but control. Discussed briefly other options including a repeat course of oral antibiotics, spironolactone or Humira.       Follow-up in 3 months, earlier for new or changing lesions.       Staff Involved:  Scribe/Staff    Scribe Disclosure:   Christohper WALSH, am serving as a scribe to document services personally performed by Judy Brown PA-C, based on data collection and the provider's statements to me.    Provider Disclosure:   The  documentation recorded by the scribe accurately reflects the services I personally performed and the decisions made by me.    All risks, benefits and alternatives were discussed with patient.  Patient is in agreement and understands the assessment and plan.  All questions were answered.  Sun Screen Education was given.   Return to Clinic in 3 months or sooner as needed.   Judy Brown PA-C   [facility]           Again, thank you for allowing me to participate in the care of your patient.      Sincerely,    Judy Brown PA-C

## 2019-12-09 NOTE — NURSING NOTE
Chief Complaint   Patient presents with     Acne     Steffi is here today for Acne follow up. Steffi notes she had to to stop Accutane because of her Liver health, she would like to discuss treatment plan.      Bibiana Rubio LPN

## 2019-12-12 ENCOUNTER — TELEPHONE (OUTPATIENT)
Dept: DERMATOLOGY | Facility: CLINIC | Age: 39
End: 2019-12-12

## 2019-12-12 NOTE — TELEPHONE ENCOUNTER
Left message for pharmacy to call back with insurance info to start PA. Insurance in chart is not an active pharmacy benefit.

## 2019-12-12 NOTE — TELEPHONE ENCOUNTER
Prior Authorization Retail Medication Request    Medication/Dose: benzoyl peroxide 5 % external liquid  ICD code (if different than what is on RX):  Hidradenitis suppurativa [L73.2]  Previously Tried and Failed:  None  Rationale:      Insurance Name:  [employer]  Insurance ID:  [id number]

## 2019-12-16 NOTE — TELEPHONE ENCOUNTER
Central Prior Authorization Team   Phone: [phone]      PA Initiation    Medication: benzoyl peroxide 5 % external liquid  Insurance Company:    Pharmacy Filling the Rx: [id number] [address] AT [facility]  Filling Pharmacy Phone: [phone]  Filling Pharmacy Fax:    Start Date: 12/16/2019

## 2019-12-17 NOTE — TELEPHONE ENCOUNTER
PRIOR AUTHORIZATION DENIED    Medication: benzoyl peroxide 5 % external liquid-DENIED    Denial Date: 12/17/2019    Denial Rational: Excluded from plan          Appeal Information:

## 2019-12-18 NOTE — TELEPHONE ENCOUNTER
Called and left a message with tSeffi and let her know that a PA was done and BPO wash was not covered. It is available over the counter.

## 2020-02-10 ENCOUNTER — OFFICE VISIT (OUTPATIENT)
Dept: OBGYN | Facility: CLINIC | Age: 40
End: 2020-02-10
Attending: OBSTETRICS & GYNECOLOGY
Payer: COMMERCIAL

## 2020-02-10 VITALS
BODY MASS INDEX: 43.9 KG/M2 | WEIGHT: 263.5 LBS | DIASTOLIC BLOOD PRESSURE: 101 MMHG | SYSTOLIC BLOOD PRESSURE: 145 MMHG | HEART RATE: 91 BPM | HEIGHT: 65 IN

## 2020-02-10 DIAGNOSIS — R87.810 CERVICAL HIGH RISK HUMAN PAPILLOMAVIRUS (HPV) DNA TEST POSITIVE: Primary | ICD-10-CM

## 2020-02-10 PROCEDURE — 88305 TISSUE EXAM BY PATHOLOGIST: CPT | Performed by: OBSTETRICS & GYNECOLOGY

## 2020-02-10 PROCEDURE — 57454 BX/CURETT OF CERVIX W/SCOPE: CPT | Mod: ZF | Performed by: OBSTETRICS & GYNECOLOGY

## 2020-02-10 ASSESSMENT — ANXIETY QUESTIONNAIRES
GAD7 TOTAL SCORE: 2
7. FEELING AFRAID AS IF SOMETHING AWFUL MIGHT HAPPEN: NOT AT ALL
6. BECOMING EASILY ANNOYED OR IRRITABLE: SEVERAL DAYS
1. FEELING NERVOUS, ANXIOUS, OR ON EDGE: NOT AT ALL
5. BEING SO RESTLESS THAT IT IS HARD TO SIT STILL: SEVERAL DAYS
2. NOT BEING ABLE TO STOP OR CONTROL WORRYING: NOT AT ALL
3. WORRYING TOO MUCH ABOUT DIFFERENT THINGS: NOT AT ALL

## 2020-02-10 ASSESSMENT — MIFFLIN-ST. JEOR: SCORE: 1876.11

## 2020-02-10 ASSESSMENT — PATIENT HEALTH QUESTIONNAIRE - PHQ9
SUM OF ALL RESPONSES TO PHQ QUESTIONS 1-9: 3
5. POOR APPETITE OR OVEREATING: NOT AT ALL

## 2020-02-10 NOTE — PATIENT INSTRUCTIONS

## 2020-02-10 NOTE — PROGRESS NOTES
"Colposcopy Visit/Procedure Note:    Steffi Hernandez is an 39 year old, , who comes in for diagnosis of abnormal pap screen.  Patient's pap history as follow:    10/2018: NILM, other HR HPV Positive  10/2019: NILM, other HR HPV Positive    Lab Results   Component Value Date    PAP NIL 10/24/2019    PAP NIL 10/11/2018    PAP NIL 2013     Last   Lab Results   Component Value Date    HPV16 Negative 10/24/2019     Last   Lab Results   Component Value Date    HPV18 Negative 10/24/2019     Last   Lab Results   Component Value Date    HRHPV Positive 10/24/2019       History   Smoking Status     Never Smoker   Smokeless Tobacco     Never Used     Allergies as of 02/10/2020 - Reviewed 02/10/2020   Allergen Reaction Noted     Animal dander  2015     Cats  2015     Dogs  2015     Rabbit protein  2015     Seasonal allergies  2007        Colposcopy Procedure:    Consent:  Details of the colposcopic procedure were reviewed. Risks, benefits of treatment, and alternate forms of evaluation were discussed.  Patient's questions were elicited and answered.   Written consent was obtained and scanned into medical record.     Verification of Procedure  Just before the procedure begins, through verbal and active participation of team members, I verified:   Initials   Patient Name [facility]   Patient  [facility]   Procedure to be performed [facility]       OBJECTIVE: BP (!) 145/101   Pulse 91   Ht 1.651 m (5' 5\")   Wt 119.5 kg (263 lb 8 oz)   LMP 2020   BMI 43.85 kg/m      Pelvic Exam:  EG/BUS: Normal genital architecture without lesions, erythema or abnormal secretions. Bartholin's, Urethra, [place]'s glands are normal.   Vagina: moist, pink, rugae with creamy, white and odorless secretions  Cervix: Nulliparous,, no lesions and pink, moist, closed, without lesion or CMT  Rectum:anus normal    PROCEDURE:  Acetic acid applied to vagina.  Colposcopic exam of the vagina and apex of the vagina was conducted in " the usual fashion.     Findings:  SCJ was not seen entirely and the exam unsatisfactory.    There were acetowhite area from 8:00 to 4:00 and a single punctate lesion at 5:00.    Biopsies were obtained at 5:00 and placed in labeled Formalin Jar.    ECC: was obtained and placed in labeled Formalin Jar.    A/P: 40 yo nulligravid female presents for colposcopy after 2 subsequent NILM, other HR HPV positive pap smears  1) Colposcopy: Completed today with JOANNA-1 impression at most.  Biopsies sent to pathology.  Will contact patient with results and recommended follow-up plan.  Patient advised to contact clinic with heavy vaginal bleeding, fever over 101 degrees F, or any other concerns.  Verbalized understanding and agreement with plan.  Will contact patient with results and plan for follow-up as appropriate given results.    Anisha Diana MD

## 2020-02-10 NOTE — LETTER
"2/10/2020     RE: Steffi Hernandez  [address] [facility] [address]     Dear Colleague,    Thank you for referring your patient, Steffi Hernandez, to the [facility] at [facility]. Please see a copy of my visit note below.    Colposcopy Visit/Procedure Note:    Steffi Hernandez is an 39 year old, , who comes in for diagnosis of abnormal pap screen.  Patient's pap history as follow:    10/2018: NILM, other HR HPV Positive  10/2019: NILM, other HR HPV Positive    Lab Results   Component Value Date    PAP NIL 10/24/2019    PAP NIL 10/11/2018    PAP NIL 2013     Last   Lab Results   Component Value Date    HPV16 Negative 10/24/2019     Last   Lab Results   Component Value Date    HPV18 Negative 10/24/2019     Last   Lab Results   Component Value Date    HRHPV Positive 10/24/2019       History   Smoking Status     Never Smoker   Smokeless Tobacco     Never Used     Allergies as of 02/10/2020 - Reviewed 02/10/2020   Allergen Reaction Noted     Animal dander  2015     Cats  2015     Dogs  2015     Rabbit protein  2015     Seasonal allergies  2007        Colposcopy Procedure:    Consent:  Details of the colposcopic procedure were reviewed. Risks, benefits of treatment, and alternate forms of evaluation were discussed.  Patient's questions were elicited and answered.   Written consent was obtained and scanned into medical record.     Verification of Procedure  Just before the procedure begins, through verbal and active participation of team members, I verified:   Initials   Patient Name [facility]   Patient  [facility]   Procedure to be performed [facility]       OBJECTIVE: BP (!) 145/101   Pulse 91   Ht 1.651 m (5' 5\")   Wt 119.5 kg (263 lb 8 oz)   LMP 2020   BMI 43.85 kg/m       Pelvic Exam:  EG/BUS: Normal genital architecture without lesions, erythema or abnormal secretions. Bartholin's, Urethra, [place]'s glands are " normal.   Vagina: moist, pink, rugae with creamy, white and odorless secretions  Cervix: Nulliparous,, no lesions and pink, moist, closed, without lesion or CMT  Rectum:anus normal    PROCEDURE:  Acetic acid applied to vagina.  Colposcopic exam of the vagina and apex of the vagina was conducted in the usual fashion.     Findings:  SCJ was not seen entirely and the exam unsatisfactory.    There were acetowhite area from 8:00 to 4:00 and a single punctate lesion at 5:00.    Biopsies were obtained at 5:00 and placed in labeled Formalin Jar.    ECC: was obtained and placed in labeled Formalin Jar.    A/P: 40 yo nulligravid female presents for colposcopy after 2 subsequent NILM, other HR HPV positive pap smears  1) Colposcopy: Completed today with JOANNA-1 impression at most.  Biopsies sent to pathology.  Will contact patient with results and recommended follow-up plan.  Patient advised to contact clinic with heavy vaginal bleeding, fever over 101 degrees F, or any other concerns.  Verbalized understanding and agreement with plan.  Will contact patient with results and plan for follow-up as appropriate given results.    Anisha Diana MD

## 2020-02-11 ASSESSMENT — ANXIETY QUESTIONNAIRES: GAD7 TOTAL SCORE: 2

## 2020-02-12 LAB — COPATH REPORT: NORMAL

## 2020-09-08 DIAGNOSIS — J30.89 CHRONIC NON-SEASONAL ALLERGIC RHINITIS: Primary | ICD-10-CM

## 2020-09-12 RX ORDER — MONTELUKAST SODIUM 10 MG/1
1 TABLET ORAL AT BEDTIME
Qty: 30 TABLET | Refills: 3 | Status: SHIPPED | OUTPATIENT
Start: 2020-09-12 | End: 2021-03-16

## 2021-01-15 ENCOUNTER — HEALTH MAINTENANCE LETTER (OUTPATIENT)
Age: 41
End: 2021-01-15

## 2021-03-10 ENCOUNTER — TELEPHONE (OUTPATIENT)
Dept: INTERNAL MEDICINE | Facility: CLINIC | Age: 41
End: 2021-03-10

## 2021-03-10 DIAGNOSIS — J30.89 CHRONIC NON-SEASONAL ALLERGIC RHINITIS: ICD-10-CM

## 2021-03-10 NOTE — TELEPHONE ENCOUNTER
montelukast (SINGULAIR) 10 MG tablet      Last Written Prescription Date:  9-12-20  Last Fill Quantity: 30,   # refills: 3  Last Office Visit : 3-19-19  Future Office visit:  none    Routing refill request to provider for review/approval because:  Last appt > 18 M  Overdue ACT-- FYI to clinic CMA    Scheduling has been notified to contact the pt for appointment.

## 2021-03-11 RX ORDER — MONTELUKAST SODIUM 10 MG/1
1 TABLET ORAL AT BEDTIME
Qty: 30 TABLET | Refills: 0 | OUTPATIENT
Start: 2021-03-11

## 2021-03-11 NOTE — TELEPHONE ENCOUNTER
Patient schedule virtual visit with PCP Corina for Tuesday March 16 at 230 PM confirmed by patient

## 2021-03-11 NOTE — TELEPHONE ENCOUNTER
Last appointment 3/19/19. Medication refill refused per clinic protocol. Patient needs an appointment for medication refills.    rCistina Jefferson RN ([place])

## 2021-03-16 ENCOUNTER — VIRTUAL VISIT (OUTPATIENT)
Dept: INTERNAL MEDICINE | Facility: CLINIC | Age: 41
End: 2021-03-16
Payer: COMMERCIAL

## 2021-03-16 DIAGNOSIS — J30.89 CHRONIC NON-SEASONAL ALLERGIC RHINITIS: ICD-10-CM

## 2021-03-16 DIAGNOSIS — K76.0 NAFLD (NONALCOHOLIC FATTY LIVER DISEASE): Primary | ICD-10-CM

## 2021-03-16 DIAGNOSIS — Z13.220 SCREENING FOR HYPERLIPIDEMIA: ICD-10-CM

## 2021-03-16 DIAGNOSIS — J45.20 MILD INTERMITTENT ASTHMA WITHOUT COMPLICATION: ICD-10-CM

## 2021-03-16 PROCEDURE — 99213 OFFICE O/P EST LOW 20 MIN: CPT | Mod: 95 | Performed by: NURSE PRACTITIONER

## 2021-03-16 RX ORDER — ALBUTEROL SULFATE 90 UG/1
2 AEROSOL, METERED RESPIRATORY (INHALATION) EVERY 6 HOURS
Qty: 8.5 G | Refills: 2 | Status: SHIPPED | OUTPATIENT
Start: 2021-03-16 | End: 2023-07-12

## 2021-03-16 RX ORDER — MONTELUKAST SODIUM 10 MG/1
1 TABLET ORAL AT BEDTIME
Qty: 90 TABLET | Refills: 3 | Status: SHIPPED | OUTPATIENT
Start: 2021-03-16 | End: 2022-03-29

## 2021-03-16 NOTE — NURSING NOTE
Chief Complaint   Patient presents with     Recheck Medication     follow up     Kimberly Nissen, EMT at 2:21 PM on 3/16/2021    Video Visit Technology for this patient: Jonh Video Visit- Patient was left in waiting room

## 2021-03-16 NOTE — PATIENT INSTRUCTIONS
Use the Fluticasone nasal spray --one spray in each nostril daily.    Fasting labs to re-check your cholesterol, as well as liver function, kidney function, blood counts and clotting factor, & electrolytes.    Schedule a follow-up with Hepatology Clinic after labs are done.

## 2021-03-16 NOTE — PROGRESS NOTES
Steffi is a 40 year old who is being evaluated via a billable video visit.      How would you like to obtain your AVS? MyChart  If the video visit is dropped, the invitation should be resent by: Send to e-mail at: [email or web address]  Will anyone else be joining your video visit? No      Video Start Time: 2:33 PM    Assessment & Plan     Chronic non-seasonal allergic rhinitis  Refill provided. Encouraged using Fluticasone nasal spray on daily basis to help reduce inflammation through the sinuses. If no improvement should see ENT again.  - montelukast (SINGULAIR) 10 MG tablet; Take 1 tablet (10 mg) by mouth At Bedtime    NAFLD (nonalcoholic fatty liver disease)  Recheck labs and follow-up with Hepatology. She agrees with the plan.  - CBC with platelets; Future  - Basic metabolic panel; Future  - Hepatic panel; Future  - INR; Future  - GASTROENTEROLOGY ADULT REF CONSULT ONLY; Future    Mild intermittent asthma without complication  Refill provided.   - albuterol (PROAIR HFA/PROVENTIL HFA/VENTOLIN HFA) 108 (90 Base) MCG/ACT inhaler; Inhale 2 puffs into the lungs every 6 hours    Screening for hyperlipidemia  Reviewed goal LDL <100, will need to start statin therapy to help reduce further complications from YO.  - Lipid panel reflex to direct LDL Fasting; Future      29 minutes spent on the date of the encounter doing chart review, history and exam, documentation and further activities as noted above      Return in about 6 months (around 9/16/2021). or sooner prn for any changes or concerns. Due for pap, this is already scheduled with gyn next month.    [facility] INTERNAL MEDICINE [facility]   Steffi is a 40 year old who presents for the following health issues     HPI     I last saw Steffi in 2019 for elevated LFTs; hepatitis work up was negative, Accutane was discontinued, presumed NAFLD. Last met with Dr. Leventhal in hepatology in April 2019, with  recommendations for repeat labs/follow-up in 3 months.     Chronic sinus congestion. Using Montelukast--not really helping. Feels sinuses plugged up and sniffling a lot during the day. Has done everything over the counter for allergies (Cetirizine, Claritin, Allegra), nothing helps. Had nasal surgery in 2016. Has Fluticasone nasal spray, not using regularly.    Asthma has been fine, occasional SOB, thought elevation related. Does not have an albuterol inhaler currently but would like to have one on hand.      Review of Systems   Constitutional, HEENT, cardiovascular, pulmonary, gi and gu systems are negative, except as otherwise noted.      Objective           Vitals:  No vitals were obtained today due to virtual visit.    Physical Exam   GENERAL: Healthy, alert and no distress  EYES: Eyes grossly normal to inspection.  No discharge or erythema, or obvious scleral/conjunctival abnormalities.  RESP: No audible wheeze, cough, or visible cyanosis.  No visible retractions or increased work of breathing.    SKIN: Visible skin clear. No significant rash, abnormal pigmentation or lesions.  NEURO: Cranial nerves grossly intact.  Mentation and speech appropriate for age.  PSYCH: Mentation appears normal, affect normal/bright, judgement and insight intact, normal speech and appearance well-groomed.                Video-Visit Details    Type of service:  Video Visit    Video End Time:2:43 PM    Originating Location (pt. Location): Home    Distant Location (provider location):  [facility] INTERNAL MEDICINE [place]     Platform used for Video Visit: PingMD

## 2021-03-17 ENCOUNTER — TELEPHONE (OUTPATIENT)
Dept: INTERNAL MEDICINE | Facility: CLINIC | Age: 41
End: 2021-03-17

## 2021-03-17 NOTE — TELEPHONE ENCOUNTER
Left voice message to help patient schedule fasting labs in the next 2 weeks per Corina Long virtual visit 3/16/21. No answered. Give lab phone number to call to schedule a lab only appointment.    Heather Bustos, Clinic Coordinator, March 17, 2021 at 10:18 AM

## 2021-03-23 ENCOUNTER — PRE VISIT (OUTPATIENT)
Dept: GASTROENTEROLOGY | Facility: CLINIC | Age: 41
End: 2021-03-23

## 2021-04-01 ASSESSMENT — ENCOUNTER SYMPTOMS
HEMOPTYSIS: 0
BRUISES/BLEEDS EASILY: 1
HOT FLASHES: 0
DEPRESSION: 1
DECREASED CONCENTRATION: 0
ARTHRALGIAS: 0
TREMORS: 0
DISTURBANCES IN COORDINATION: 0
MUSCLE CRAMPS: 1
PANIC: 0
HEMATURIA: 0
DIFFICULTY URINATING: 0
COUGH DISTURBING SLEEP: 0
NECK PAIN: 1
DIZZINESS: 0
SEIZURES: 0
WEAKNESS: 0
STIFFNESS: 0
SHORTNESS OF BREATH: 0
TINGLING: 1
HEADACHES: 0
MEMORY LOSS: 0
MYALGIAS: 1
SPUTUM PRODUCTION: 0
NERVOUS/ANXIOUS: 1
SNORES LOUDLY: 1
WHEEZING: 0
DYSURIA: 0
INSOMNIA: 1
POSTURAL DYSPNEA: 0
PARALYSIS: 0
JOINT SWELLING: 0
SWOLLEN GLANDS: 0
DYSPNEA ON EXERTION: 0
DECREASED LIBIDO: 1
FLANK PAIN: 0
SPEECH CHANGE: 0
COUGH: 0
LOSS OF CONSCIOUSNESS: 0
BACK PAIN: 1
NUMBNESS: 1
MUSCLE WEAKNESS: 0

## 2021-04-01 ASSESSMENT — ANXIETY QUESTIONNAIRES
4. TROUBLE RELAXING: NOT AT ALL
6. BECOMING EASILY ANNOYED OR IRRITABLE: SEVERAL DAYS
2. NOT BEING ABLE TO STOP OR CONTROL WORRYING: NOT AT ALL
1. FEELING NERVOUS, ANXIOUS, OR ON EDGE: NOT AT ALL
3. WORRYING TOO MUCH ABOUT DIFFERENT THINGS: SEVERAL DAYS
GAD7 TOTAL SCORE: 2
GAD7 TOTAL SCORE: 2
5. BEING SO RESTLESS THAT IT IS HARD TO SIT STILL: NOT AT ALL
7. FEELING AFRAID AS IF SOMETHING AWFUL MIGHT HAPPEN: NOT AT ALL
7. FEELING AFRAID AS IF SOMETHING AWFUL MIGHT HAPPEN: NOT AT ALL

## 2021-04-02 ASSESSMENT — ANXIETY QUESTIONNAIRES: GAD7 TOTAL SCORE: 2

## 2021-04-06 ENCOUNTER — VIRTUAL VISIT (OUTPATIENT)
Dept: INTERNAL MEDICINE | Facility: CLINIC | Age: 41
End: 2021-04-06
Payer: COMMERCIAL

## 2021-04-06 DIAGNOSIS — E66.01 CLASS 3 SEVERE OBESITY DUE TO EXCESS CALORIES WITH SERIOUS COMORBIDITY AND BODY MASS INDEX (BMI) OF 40.0 TO 44.9 IN ADULT (H): ICD-10-CM

## 2021-04-06 DIAGNOSIS — R20.2 NUMBNESS AND TINGLING IN BOTH HANDS: ICD-10-CM

## 2021-04-06 DIAGNOSIS — F33.1 MAJOR DEPRESSIVE DISORDER, RECURRENT EPISODE, MODERATE (H): ICD-10-CM

## 2021-04-06 DIAGNOSIS — R20.0 NUMBNESS AND TINGLING IN BOTH HANDS: ICD-10-CM

## 2021-04-06 DIAGNOSIS — E66.813 CLASS 3 SEVERE OBESITY DUE TO EXCESS CALORIES WITH SERIOUS COMORBIDITY AND BODY MASS INDEX (BMI) OF 40.0 TO 44.9 IN ADULT (H): ICD-10-CM

## 2021-04-06 DIAGNOSIS — F51.04 PSYCHOPHYSIOLOGICAL INSOMNIA: Primary | ICD-10-CM

## 2021-04-06 PROCEDURE — 99213 OFFICE O/P EST LOW 20 MIN: CPT | Mod: 95 | Performed by: NURSE PRACTITIONER

## 2021-04-06 ASSESSMENT — PAIN SCALES - GENERAL: PAINLEVEL: NO PAIN (0)

## 2021-04-06 NOTE — PROGRESS NOTES
Steffi is a 40 year old who is being evaluated via a billable video visit.      How would you like to obtain your AVS? Norman  If the video visit is dropped, the invitation should be resent by: Other e-mail: Norman  Will anyone else be joining your video visit? No      Video Start Time: 3:52 PM    Assessment & Plan     Psychophysiological insomnia  Reviewed sleep hygiene with keeping dark/quiet room, same sleep/wake times, avoiding naps, regular exercise, and don't lay in bed longer than ~20 minutes without falling asleep, (see AVS) and avoiding screen time 1-2 hours before bed. Discussed medication to treat, such as Trazodone, she prefers to avoid mediation at this point. Can continue with melatonin, okay to increase to 10 mg, or try alternative supplements, such as sleepy time tea. Work on relaxation exercises, such as deep breathing, mindfulness/meditation.     Major depressive disorder, recurrent episode, moderate (H)  Continue working with therapist, can also discuss insomnia with her therapist.    Class 3 severe obesity due to excess calories with serious comorbidity and body mass index (BMI) of 40.0 to 44.9 in adult (H)  Continue working on healthy lifestyle, has been walking more.    Numbness and tingling in both hands  Exam limited today d/t video visit. Labs scheduled on 4/8/21. Will add on B12 and Vit D. Encouraged using her wrist brace to see if this helps, can order a left wrist brace, or if she needs me to place an order for this, she can let us know. She is already seeing a chiropractor and can discuss this with him as well. There are no signs of cervical radiculopathy based on her history.   - Vitamin B12; Future  - Vitamin D Deficiency; Future    29 minutes spent on the date of the encounter doing chart review, history and exam, documentation and further activities per the note         Return if symptoms worsen or fail to improve. Is scheduled with Dr. Diana in Gyn for annual wellness with  pap/pelvic next week.    LM Farr CNP  [facility] INTER[facility] MEDIC[facility] YAIMA Kapadia is a 40 year old who presents for the following health issues     HPI     Sleep problems--difficulty falling asleep and staying asleep. Has tried melatonin 5 mg, but this hasn't seemed to help--could fall asleep but then would wake up. Issue since last fall. In January, found out that contract wasn't renewed, so is searching for a new job. Sometimes can't fall asleep until 1-2 am, or wakes up at 2 am and can't fall asleep, goes to the bathroom and can't fall back asleep. Since early March, isn't working. Tries to keep busy during the day, then has dinner with her partner, goes for walks daily for exercise. She usually isn't tired when he goes to bed, so stays up past 10-11 pm, up by 8-9 am.    Still doing therapy for depression and anxiety. Didn't want to continue with medication to treat.     Tingling/numbness in the hands--bilateral. Has been present for a few years intermittently, seems to be worsening. Previously took supplements/vitamins and saw chiropractor for it, which was helpful at that time. Notices most when driving. Usually one or the other, not at the same time, needs to shake it out. Not in the fingers as much as in the palm. Sometimes notices when she's relaxing on the couch, or sleeping position. Feels normal  strength. No weakness in the arms. Has some tension in the right forearm. No discoloration in the hands. No recent treatment. Has a R wrist brace that she's worn in the past, but that was for a tendonitis in 2007.     Review of Systems   Constitutional, HEENT, cardiovascular, pulmonary, gi and gu systems are negative, except as otherwise noted.      Objective    Vitals - Patient Reported  Pain Score: No Pain (0)        Physical Exam   GENERAL: Healthy, alert and no distress  EYES: Eyes grossly normal to inspection.  No discharge or erythema, or obvious  scleral/conjunctival abnormalities.  RESP: No audible wheeze, cough, or visible cyanosis.  No visible retractions or increased work of breathing.    SKIN: Visible skin clear. No significant rash, abnormal pigmentation or lesions.  NEURO: Cranial nerves grossly intact.  Mentation and speech appropriate for age.  PSYCH: Mentation appears normal, affect normal/bright, judgement and insight intact, normal speech and appearance well-groomed.                Video-Visit Details    Type of service:  Video Visit    Video End Time:4:10 PM    Originating Location (pt. Location): Home    Distant Location (provider location):  [facility] INTERNAL MEDICINE [place]     Platform used for Video Visit: [employer]

## 2021-04-06 NOTE — NURSING NOTE
Chief Complaint   Patient presents with     Numbness     Discuss numbness and tingling on both hands.     CHRISTOPHER Erazo 11:44 AM  4/6/2021

## 2021-04-06 NOTE — PATIENT INSTRUCTIONS
Primary Care Center Phone Number [phone]  [facility] Medication Refill Request Information:  * Please contact your pharmacy regarding ANY request for medication refills.  ** [facility] Prescription Fax = [phone]  * Please allow 3 business days for routine medication refills.  * Please allow 5 business days for controlled substance medication refills.     Primary Care Center Test Result notification information:  *You will be notified with in 7-10 days of your appointment day regarding the results of your test.  If you are on MyChart you will be notified as soon as the provider has reviewed the results and signed off on them.        If you have questions regarding Covid-19 and the Covid-19 vaccine, please visit this website.    [email or web address]    SPECIAL THERAPY MEASURES FOR INSOMNIA  Relaxation Exercises:  Progressive Muscle Relaxation (PMR) and Deep Breathing Exercises  Stress, tension, and anxiety can be big factors contributing to insomnia.  If you can t relax your mind and body, then you won t be able to sleep.  You would likely benefit from trying some of the relaxation exercises that we ve assembled below.  These are all internet based links.  If you don t have or use a computer, you may benefit from one of the stress management books listed below that you can get at your public library or at a local bookstore for a relatively low price.    You may have to pay for some of these resources.    http://www.Beanup.[employer]/progressive-muscle-relaxation-exercise.html     http://studentsupport.[facility]/counseling/resources/self-help/relaxation-and-stress-management/     http://www.Bean[employer]/breathing-awareness.html   Meditation and Guided Imagery    www.YOGITECH    www.Letguided-meditation-site.com    http://www.nivio/guided-imagery-scripts.html    http://Ixchelsis/library/dcifhuomrj-sesxum-fxpblsg/  Sleep  Restriction Therapy:  Limit time in bed for 15 minutes more than sleep   Do not set limit under 4 hours   Increase time by 15 minutes per effective sleep trial   Effective sleep suggests >90% of bedtime asleep   Stimulus Control:  Do not lie awake for more than 30 minutes   Get out of bed and perform a quiet activity   Return to bed when sleepy   Repeat as many times per night as needed   No Napping during day   Suggested Resources:   Insomnia Treatment Books     Overcoming Insomnia by Elton Lockhart and Melonie Ham (2008)    No More Sleepless Nights by Nain Iniguez and Veronica Madison (1996)    Say Fuad to Insomnia by Arnol Helm (2009)    The Insomnia Workbook by Es Omalley and Stephen Montanez (2009)    The Insomnia Answer by Brown Juarez and Joe Dennis (2006)    Therapy for Sleep Problems                                                                                   Cognitive behavioral treatment for insomnia (CBT-I) is a very effective technique that involves changing behaviors and thoughts associated with sleep.

## 2021-04-08 ENCOUNTER — TELEPHONE (OUTPATIENT)
Dept: INTERNAL MEDICINE | Facility: CLINIC | Age: 41
End: 2021-04-08

## 2021-04-08 DIAGNOSIS — K76.0 NAFLD (NONALCOHOLIC FATTY LIVER DISEASE): ICD-10-CM

## 2021-04-08 DIAGNOSIS — R20.2 NUMBNESS AND TINGLING IN BOTH HANDS: ICD-10-CM

## 2021-04-08 DIAGNOSIS — R20.0 NUMBNESS AND TINGLING IN BOTH HANDS: ICD-10-CM

## 2021-04-08 DIAGNOSIS — Z13.220 SCREENING FOR HYPERLIPIDEMIA: ICD-10-CM

## 2021-04-08 LAB
ALBUMIN SERPL-MCNC: 3.3 G/DL (ref 3.4–5)
ALP SERPL-CCNC: 82 U/L (ref 40–150)
ALT SERPL W P-5'-P-CCNC: 95 U/L (ref 0–50)
ANION GAP SERPL CALCULATED.3IONS-SCNC: 6 MMOL/L (ref 3–14)
AST SERPL W P-5'-P-CCNC: 72 U/L (ref 0–45)
BILIRUB DIRECT SERPL-MCNC: 0.1 MG/DL (ref 0–0.2)
BILIRUB SERPL-MCNC: 0.7 MG/DL (ref 0.2–1.3)
BUN SERPL-MCNC: 14 MG/DL (ref 7–30)
CALCIUM SERPL-MCNC: 8.5 MG/DL (ref 8.5–10.1)
CHLORIDE SERPL-SCNC: 107 MMOL/L (ref 94–109)
CHOLEST SERPL-MCNC: 209 MG/DL
CO2 SERPL-SCNC: 26 MMOL/L (ref 20–32)
CREAT SERPL-MCNC: 0.8 MG/DL (ref 0.52–1.04)
DEPRECATED CALCIDIOL+CALCIFEROL SERPL-MC: 14 UG/L (ref 20–75)
ERYTHROCYTE [DISTWIDTH] IN BLOOD BY AUTOMATED COUNT: 13.1 % (ref 10–15)
GFR SERPL CREATININE-BSD FRML MDRD: >90 ML/MIN/{1.73_M2}
GLUCOSE SERPL-MCNC: 116 MG/DL (ref 70–99)
HCT VFR BLD AUTO: 37.5 % (ref 35–47)
HDLC SERPL-MCNC: 81 MG/DL
HGB BLD-MCNC: 12.1 G/DL (ref 11.7–15.7)
INR PPP: 0.97 (ref 0.86–1.14)
LDLC SERPL CALC-MCNC: 110 MG/DL
MCH RBC QN AUTO: 27.9 PG (ref 26.5–33)
MCHC RBC AUTO-ENTMCNC: 32.3 G/DL (ref 31.5–36.5)
MCV RBC AUTO: 87 FL (ref 78–100)
NONHDLC SERPL-MCNC: 128 MG/DL
PLATELET # BLD AUTO: 229 10E9/L (ref 150–450)
POTASSIUM SERPL-SCNC: 3.9 MMOL/L (ref 3.4–5.3)
PROT SERPL-MCNC: 7.3 G/DL (ref 6.8–8.8)
RBC # BLD AUTO: 4.33 10E12/L (ref 3.8–5.2)
SODIUM SERPL-SCNC: 139 MMOL/L (ref 133–144)
TRIGL SERPL-MCNC: 90 MG/DL
VIT B12 SERPL-MCNC: 346 PG/ML (ref 193–986)
WBC # BLD AUTO: 7.2 10E9/L (ref 4–11)

## 2021-04-08 PROCEDURE — 80048 BASIC METABOLIC PNL TOTAL CA: CPT | Performed by: NURSE PRACTITIONER

## 2021-04-08 PROCEDURE — 82306 VITAMIN D 25 HYDROXY: CPT | Performed by: NURSE PRACTITIONER

## 2021-04-08 PROCEDURE — 36415 COLL VENOUS BLD VENIPUNCTURE: CPT | Performed by: NURSE PRACTITIONER

## 2021-04-08 PROCEDURE — 80061 LIPID PANEL: CPT | Performed by: NURSE PRACTITIONER

## 2021-04-08 PROCEDURE — 85610 PROTHROMBIN TIME: CPT | Performed by: NURSE PRACTITIONER

## 2021-04-08 PROCEDURE — 80076 HEPATIC FUNCTION PANEL: CPT | Performed by: NURSE PRACTITIONER

## 2021-04-08 PROCEDURE — 85027 COMPLETE CBC AUTOMATED: CPT | Performed by: NURSE PRACTITIONER

## 2021-04-08 PROCEDURE — 82607 VITAMIN B-12: CPT | Performed by: NURSE PRACTITIONER

## 2021-04-08 NOTE — TELEPHONE ENCOUNTER
Called Amish.  He would like to confirm order for CBC with Platelets and not CBC with Platelets Diff.  Gave verbal order to run CBC with platelets as ordered by provider.  Amish gave verbal confirmation.      Stan Pineda CMA ([facility]) at 1:03 PM on 4/8/2021

## 2021-04-08 NOTE — TELEPHONE ENCOUNTER
[facility]    Phone Message    May a detailed message be left on voicemail: no     Reason for Call: Other: Amish from the [facility] lab is calling asking if Corina Long is going to order the pt a CBS with Disk for the pt.  Amish is requesting a call back today to discuss     Action Taken: Message routed to:  [facility] ([facility] P[facility]    Travel Screening: Not Applicable

## 2021-04-12 ENCOUNTER — OFFICE VISIT (OUTPATIENT)
Dept: OBGYN | Facility: CLINIC | Age: 41
End: 2021-04-12
Attending: OBSTETRICS & GYNECOLOGY
Payer: COMMERCIAL

## 2021-04-12 VITALS
SYSTOLIC BLOOD PRESSURE: 149 MMHG | BODY MASS INDEX: 45.22 KG/M2 | HEIGHT: 65 IN | WEIGHT: 271.4 LBS | DIASTOLIC BLOOD PRESSURE: 102 MMHG | HEART RATE: 78 BPM

## 2021-04-12 DIAGNOSIS — Z12.4 SCREENING FOR MALIGNANT NEOPLASM OF CERVIX: ICD-10-CM

## 2021-04-12 DIAGNOSIS — N92.6 IRREGULAR MENSES: ICD-10-CM

## 2021-04-12 DIAGNOSIS — N92.6 MISSED MENSES: ICD-10-CM

## 2021-04-12 DIAGNOSIS — Z01.419 ENCOUNTER FOR GYNECOLOGICAL EXAMINATION WITHOUT ABNORMAL FINDING: Primary | ICD-10-CM

## 2021-04-12 LAB
HCG UR QL: NEGATIVE
INTERNAL QC OK POCT: YES

## 2021-04-12 PROCEDURE — 81025 URINE PREGNANCY TEST: CPT | Performed by: OBSTETRICS & GYNECOLOGY

## 2021-04-12 PROCEDURE — 87624 HPV HI-RISK TYP POOLED RSLT: CPT | Performed by: OBSTETRICS & GYNECOLOGY

## 2021-04-12 PROCEDURE — G0463 HOSPITAL OUTPT CLINIC VISIT: HCPCS

## 2021-04-12 PROCEDURE — 99395 PREV VISIT EST AGE 18-39: CPT | Performed by: OBSTETRICS & GYNECOLOGY

## 2021-04-12 PROCEDURE — G0145 SCR C/V CYTO,THINLAYER,RESCR: HCPCS | Performed by: OBSTETRICS & GYNECOLOGY

## 2021-04-12 RX ORDER — MEDROXYPROGESTERONE ACETATE 10 MG
TABLET ORAL
Qty: 10 TABLET | Refills: 3 | Status: SHIPPED | OUTPATIENT
Start: 2021-04-12 | End: 2022-06-12

## 2021-04-12 ASSESSMENT — ANXIETY QUESTIONNAIRES
2. NOT BEING ABLE TO STOP OR CONTROL WORRYING: NOT AT ALL
1. FEELING NERVOUS, ANXIOUS, OR ON EDGE: SEVERAL DAYS
GAD7 TOTAL SCORE: 2
3. WORRYING TOO MUCH ABOUT DIFFERENT THINGS: NOT AT ALL
5. BEING SO RESTLESS THAT IT IS HARD TO SIT STILL: NOT AT ALL
6. BECOMING EASILY ANNOYED OR IRRITABLE: SEVERAL DAYS
7. FEELING AFRAID AS IF SOMETHING AWFUL MIGHT HAPPEN: NOT AT ALL

## 2021-04-12 ASSESSMENT — PATIENT HEALTH QUESTIONNAIRE - PHQ9
SUM OF ALL RESPONSES TO PHQ QUESTIONS 1-9: 4
5. POOR APPETITE OR OVEREATING: NOT AT ALL

## 2021-04-12 ASSESSMENT — MIFFLIN-ST. JEOR: SCORE: 1906.94

## 2021-04-12 NOTE — LETTER
4/12/2021       RE: Steffi Hernandez  [address] [facility] [address]     Dear Colleague,    Thank you for referring your patient, Steffi Hernandez, to the [facility] [place] at [facility]. Please see a copy of my visit note below.    Annual Well Woman Exam  4/12/2021    Reason for visit: Annual well woman exam    HPI: Patient is a 41 yo nulligravid female who presents today for annual well woman exam.  Last year patient had discussion of going off OCP due to no longer using Accutane due to liver concerns as well as her partner, Tim, having had a vasectomy and not needing OCP for contraception.  Today, patient states overall doing well.  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  Hoping her insurance will still cover us here moving forward.    Has not had menses since January.  Unsure if related to job loss and stress of this, but not back yet.  Has not taken UPT as wasn't worried about it due to vasectomy of partner, but guess may as well check.  Not interested in long term hormonal options as has not done well on this before.  Has had recent preventive exam with PCP and had fasting glucose in diabetes range, lipids slightly elevation.      Patient does note mood changes around everything in the last year.  Does have counselor she has good relationship with and feels supported in this way.  Declines any further intervention at this time from that perspective.  Has been on medications in the past and did not do well with them.  Has had some associated sleep disturbances as well, unsure what exactly could be attributed to, possible with all of this.    Past OB/GYN History:  Nulligravid, no desires for future childbearing  Menses: Per HPI  No STI history  History of female and male partners,  currently with male partner Tim, live in [place] together in their house  Contraception: Vasectomy  Pap smear history:   10/2018 had NILM, other HR HPV positive pap  10/2019 had NILM, other HR HPV positive pap  2/2020: Colposcopy negative for dysplasia on cervical biopsy and ECC  Due for cotesting today  No concerning discharge, No dyspareunia  Declines STI Screening today    Past Medical History:   Diagnosis Date     Abnormal Pap smear     Don't remember when it was and follow up clear     Abnormal Pap smear of cervix 2019     Allergic rhinitis, cause unspecified      Depressive disorder 2008     Migraines 1998     Obesity      Obstructive sleep apnea      Other acne      Polycystic ovary syndrome 2013     Sleep apnea     No treatment at this time.     Uncomplicated asthma     Borderline - exercise induced     Unspecified urinary incontinence      Varicella 1985     Past Surgical History:   Procedure Laterality Date     BIOPSY  2011    Lump in breast - diagnosis was fatty tumor     GENITOURINARY SURGERY  1986    Urethrotomy     SEPTOPLASTY, TURBINOPLASTY, COMBINED N/A 5/18/2016    Procedure: COMBINED SEPTOPLASTY, TURBINOPLASTY;  Surgeon: Baldev Perez MD;  Location:  OR     [facility] NONSPECIFIC PROCEDURE      Urology surgery for night time bed wetting at age 6.     Current Outpatient Medications   Medication     medroxyPROGESTERone (PROVERA) 10 MG tablet     albuterol (PROAIR HFA/PROVENTIL HFA/VENTOLIN HFA) 108 (90 Base) MCG/ACT inhaler     montelukast (SINGULAIR) 10 MG tablet     No current facility-administered medications for this visit.      Allergies   Allergen Reactions     Animal Dander      Cats      Dogs      Rabbit Protein      Seasonal Allergies      Social History     Tobacco Use     Smoking status: Never Smoker     Smokeless tobacco: Never Used   Substance Use Topics     Alcohol use: Yes     Comment: 6 drinks weekly     Drug use: No     Family History   Problem Relation Age of Onset      Cerebrovascular Disease Father         x2     Hypertension Father      Seizure Disorder Father      Substance Abuse Father      Diabetes Paternal Grandfather      Cancer Paternal Grandfather           from throat cancer. Also had melanoma.     Blood Disease Paternal Grandfather         B12 DEFICIENCY - WAS ON B12 SHOTS     Substance Abuse Paternal Grandfather      Obesity Paternal Grandfather      Cancer Maternal Grandmother          of colon cancer     Colon Cancer Maternal Grandmother      Substance Abuse Maternal Grandmother      Cerebrovascular Disease Maternal Grandfather      Heart Disease Maternal Grandfather               Substance Abuse Maternal Grandfather      Neurologic Disorder Sister         Epilepsy diagnosed      Neurologic Disorder Sister         Epilepsy diagnosed      Mental Illness Sister      Substance Abuse Sister      Substance Abuse Paternal Grandmother      Melanoma No family hx of      Skin Cancer No family hx of      Liver Disease No family hx of      ROS:  Answers for HPI/ROS submitted by the patient on 2021, reviewed and unchanged today   TRISHA 7 TOTAL SCORE: 2  General Symptoms: No  Skin Symptoms: No  HENT Symptoms: No  EYE SYMPTOMS: No  HEART SYMPTOMS: No  LUNG SYMPTOMS: Yes  INTESTINAL SYMPTOMS: No  URINARY SYMPTOMS: Yes  GYNECOLOGIC SYMPTOMS: Yes  BREAST SYMPTOMS: No  SKELETAL SYMPTOMS: Yes  BLOOD SYMPTOMS: Yes  NERVOUS SYSTEM SYMPTOMS: Yes  MENTAL HEALTH SYMPTOMS: Yes  Cough: No  Sputum or phlegm: No  Coughing up blood: No  Difficulty breating or shortness of breath: No  Snoring: Yes  Wheezing: No  Difficulty breathing on exertion: No  Nighttime Cough: No  Difficulty breathing when lying flat: No  Trouble holding urine or incontinence: Yes  Pain or burning: No  Trouble starting or stopping: No  Increased frequency of urination: Yes  Blood in urine: No  Decreased frequency of urination: No  Frequent nighttime urination: No  Flank pain:  "No  Difficulty emptying bladder: No  Back pain: Yes  Muscle aches: Yes  Neck pain: Yes  Swollen joints: No  Joint pain: No  Bone pain: No  Muscle cramps: Yes  Muscle weakness: No  Joint stiffness: No  Bone fracture: No  Anemia: No  Swollen glands: No  Easy bleeding or bruising: Yes  Edema or swelling: No  Trouble with coordination: No  Dizziness or trouble with balance: No  Fainting or black-out spells: No  Memory loss: No  Headache: No  Seizures: No  Speech problems: No  Tingling: Yes  Tremor: No  Weakness: No  Difficulty walking: No  Paralysis: No  Numbness: Yes  Bleeding or spotting between periods: No  Heavy or painful periods: No  Irregular periods: Yes  Vaginal discharge: No  Hot flashes: No  Vaginal dryness: No  Genital ulcers: No  Reduced libido: Yes  Painful intercourse: No  Difficulty with sexual arousal: No  Post-menopausal bleeding: No  Nervous or Anxious: Yes  Depression: Yes  Trouble sleeping: Yes  Trouble thinking or concentrating: No  Mood changes: Yes  Panic attacks: No    O:  BP (!) 149/102   Pulse 78   Ht 1.651 m (5' 5\")   Wt 123.1 kg (271 lb 6.4 oz)   BMI 45.16 kg/m     General: NAD, A&Ox3  Neck: No thyromegaly, No thyroid nodules appreciated  Lungs: CTA B/L  CV: RRR  Breasts: Symmetrical, No lymphadenopathy, skin changes, nipple discharge or nodules appreciated bilaterally  Abdomen: Soft, NT, ND  Genitourinary:   External Genitalia:  General appearance; normal, Hair distribution; normal, Lesions absent  Urethral Meatus:  Size normal, Location normal, Lesions absent  Urethra:  Fullness absent, Masses absent  Bladder:  Fullness absent, Masses absent, Tenderness absent  Vagina:  General appearance normal, Estrogen effect normal, Discharge absent, Lesions absent  Cervix:  General appearance normal, Lesions absent, Discharge absent, Tenderness absent, Enlargement absent  Uterus:  Size normal, Masses absent, Tenderness absent  Adenexa:  No masses appreciated  Anus/Perineum:  Lesions absent "     A/P: 41 yo nulligravid female presents for annual well woman exam  1) Normal breast and pelvic exam  2) Screening for malignant neoplasm of cervix: Due for cotesting today, collected  3) Screening for breast cancer: Turns 41 in July, discussed pros and cons of early mammogram screening, desires to proceed however given insurance change will plan for awaiting to see if we are covered and will ask for order if can still get here moving forward.  4) Irregular menses: UPT today to ensure negative.  Did discuss importance of withdrawal bleeds every 3-4 months if not happening spontaneously from endometrial protections standpoint.  Discussed provera versus Mirena, desires provera.  Given Rx today.  5) Mood changes: Has counselor she works with and it beneficial, plans to continue with this intervention, declines any further intervention at this time, aware of options and to reach out if desire further intervention.  6) UTD on diabetes and lipid screening with PCP, fasting glucose pre-diabetes level and patient aware  7) Sleep disturbance: Did discuss consideration of sleep study to examine for LANDRY and see if related, will consider  8) RTC in 1 year for annual, earlier if abnormal pap or with any other concerns    Anisha Diana MD

## 2021-04-15 LAB
COPATH REPORT: NORMAL
PAP: NORMAL

## 2021-04-16 LAB
FINAL DIAGNOSIS: NORMAL
HPV HR 12 DNA CVX QL NAA+PROBE: NEGATIVE
HPV16 DNA SPEC QL NAA+PROBE: NEGATIVE
HPV18 DNA SPEC QL NAA+PROBE: NEGATIVE
SPECIMEN DESCRIPTION: NORMAL
SPECIMEN SOURCE CVX/VAG CYTO: NORMAL

## 2021-04-20 ENCOUNTER — VIRTUAL VISIT (OUTPATIENT)
Dept: GASTROENTEROLOGY | Facility: CLINIC | Age: 41
End: 2021-04-20
Attending: INTERNAL MEDICINE
Payer: COMMERCIAL

## 2021-04-20 DIAGNOSIS — K75.81 NASH (NONALCOHOLIC STEATOHEPATITIS): ICD-10-CM

## 2021-04-20 DIAGNOSIS — E66.01 MORBID OBESITY, UNSPECIFIED OBESITY TYPE (H): Primary | ICD-10-CM

## 2021-04-20 PROCEDURE — 99213 OFFICE O/P EST LOW 20 MIN: CPT | Mod: 95 | Performed by: INTERNAL MEDICINE

## 2021-04-20 ASSESSMENT — PAIN SCALES - GENERAL: PAINLEVEL: NO PAIN (0)

## 2021-04-20 NOTE — PROGRESS NOTES
Steffi is a 40 year old who is being evaluated via a billable video visit.      How would you like to obtain your AVS? MyChart  If the video visit is dropped, the invitation should be resent by: Text to cell phone: [phone]  Will anyone else be joining your video visit? No    Video Start Time: 1130  Video-Visit Details    Type of service:  Video Visit    Video End Time:1140    Originating Location (pt. Location): Home    Distant Location (provider location):  [facility]PATOLOG[facility] [place]     Platform used for Video Visit: twidox    Date of Service: April 20, 2021     Referring Provider: Ethan    Subjective:            Steffi Hernandez is a 40 year old female presenting for evaluation of abnormal liver tests    History of Present Illness   Steffi Hernandez is a 40 year old female with past medical history of morbid obesity, obstructive sleep apnea, acne who presents in follow up for fatty liver disease.  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     She reports that she started to see a therapist in October and feels she is in a good place from a mental health perspective.     She also has noted that she has gained weight over the past few years.  Objectively, she weighed 255lbs in 4/2019 and 271 lbs in 4/2021.    Past Medical History:  Past Medical History:   Diagnosis Date     Abnormal Pap smear     Don't remember when it was and follow up clear     Abnormal Pap smear of cervix 2019     Allergic rhinitis, cause unspecified      Depressive disorder 2008     Migraines 1998     Obesity      Obstructive sleep apnea      Other acne      Polycystic ovary syndrome 2013     Sleep apnea     No treatment at this time.      Uncomplicated asthma     Borderline - exercise induced     Unspecified urinary incontinence      Varicella        Surgical History:  Past Surgical History:   Procedure Laterality Date     BIOPSY      Lump in breast - diagnosis was fatty tumor     GENITOURINARY SURGERY      Urethrotomy     SEPTOPLASTY, TURBINOPLASTY, COMBINED N/A 2016    Procedure: COMBINED SEPTOPLASTY, TURBINOPLASTY;  Surgeon: Baldev Perez MD;  Location:  OR     [facility] NONSPECIFIC PROCEDURE      Urology surgery for night time bed wetting at age 6.       Social History:  Social History     Tobacco Use     Smoking status: Never Smoker     Smokeless tobacco: Never Used   Substance Use Topics     Alcohol use: Yes     Comment: 6 drinks weekly     Drug use: No       Family History:  Family History   Problem Relation Age of Onset     Cerebrovascular Disease Father         x2     Hypertension Father      Seizure Disorder Father      Substance Abuse Father      Diabetes Paternal Grandfather      Cancer Paternal Grandfather           from throat cancer. Also had melanoma.     Blood Disease Paternal Grandfather         B12 DEFICIENCY - WAS ON B12 SHOTS     Substance Abuse Paternal Grandfather      Obesity Paternal Grandfather      Cancer Maternal Grandmother          of colon cancer     Colon Cancer Maternal Grandmother      Substance Abuse Maternal Grandmother      Cerebrovascular Disease Maternal Grandfather      Heart Disease Maternal Grandfather               Substance Abuse Maternal Grandfather      Neurologic Disorder Sister         Epilepsy diagnosed      Neurologic Disorder Sister         Epilepsy diagnosed      Mental Illness Sister      Substance Abuse Sister      Substance Abuse Paternal Grandmother      Melanoma No family hx of      Skin Cancer No family hx of      Liver Disease No family hx of        Medications:  Current Outpatient Medications   Medication     albuterol (PROAIR  HFA/PROVENTIL HFA/VENTOLIN HFA) 108 (90 Base) MCG/ACT inhaler     medroxyPROGESTERone (PROVERA) 10 MG tablet     montelukast (SINGULAIR) 10 MG tablet     No current facility-administered medications for this visit.        Review of Systems  A complete 10 point review of systems was asked and answered in the negative unless specifically commented upon in the HPI    Objective:         There were no vitals filed for this visit.  There is no height or weight on file to calculate BMI.     Physical Exam    Vitals reviewed.   Constitutional: Well-developed, well-nourished, in no apparent distress.    HEENT: Normocephalic. No scleral icterus.   Respiratory: Normal respiratory excursion   Skin:  No jaundice.  Musculoskeletal:  ROM intact  Psychiatric: Normal mood and affect. Behavior is normal.    Labs and Diagnostic tests:  Reviewed labs in EPIC  AST 72, ALT 95    Imaging:  Abdominal US 3/20/2019  Liver shows increased echogenicity and coarsened echotexture  with obscuration of the intrahepatic architecture and vasculature and  attenuation of the ultrasound beam. Visualized portions of the  pancreas are normal. The spleen is normal in size at 11.6 cm. The  gallbladder is normal. Sonographic Escobar's sign is negative. There  are no gallstones. Common duct measures 6 mm. The aorta and IVC are  normal. The right kidney measures 11.4 cm. The left kidney measures  11.1 cm. There is no hydronephrosis.      Assessment and Plan:    Abnormal Liver Tests:    -Based on her history of morbid obesity, dyslipidemia, obstructive sleep apnea, and a questionable history of polycystic ovarian syndrome: she most likely has nonalcoholic fatty liver disease.  - Lab evaluation from 5/2019 was not overtly suggestive of other causes of liver disease  - Will continue to attempt lifestyle modifications as per below  - If despite assertive weight loss the liver tests are still elevated, will consider liver biopsy    Non-Alcoholic Fatty Liver  "Disease  - I had a long discussion with the patient about nonalcoholic fatty liver disease (NAFLD).  We discussed how fat deposits in the liver, how this leads to inflammation, and how chronic inflammation (YO) can ultimately lead to scarring and cirrhosis.  The long-term complications of fatty liver disease were discussed with the patient, including the increased risk of cardiovascular disease complications,the risk of developing diabetes (if not already), and variable progression to cirrhosis and end stage liver disease.  - Regarding the management of fatty liver disease, I did discuss with the patient about an appropriate diet, exercise and weight loss plan.    - We recommend exercise regularly 3-4 times per week, with an average of about 30-45 minutes per day.   - We recommend a low-carbohydrate, low-calorie diet (9559-1810 calories per day). The weight loss plan is to lose 7-10% of body weight in the next three to six months' time.  It has shown that patients who exercise regularly can have improvement of insulin resistance and resolution of fatty liver disease, even if they are not able to lose weight.   - Diabetes management is crucial with ideal goal Hgb A1c goal of =/< 7%. If possible addition of insulin sensitizing agents like metformin will be helpful.    - Management of elevated cholesterol is also important in this population.  The use of \"statins\" (HMG-CoA reductase medications) are an effective means of therapy and are not contra-indicated in those with abnormal liver tests OR those with cirrhosis.  The value of these medications in this population far outweigh the minor risks of abnormal liver tests.  Goal LDL in those with YO are < 100 mg/dL  - We will place a referral to the [place] [facility] [facility]    Routine Health Care in Patient with Chronic Liver Disease:  - All patients with liver disease, particularly those with cholestatic liver disease, are at an increased risk for osteoporosis.  " We strongly recommend screening for Vitamin D deficiency at least twice yearly with aggressive supplementation/replacement as indicated.    - We also recommend a screening DEXA scan to evaluate for osteoporosis.  If present, should treat with calcium, Vitamin D supplementation, and recommend consideration of bisphosphonate therapy.  Also recommend follow up DEXA scans to evaluate for improvement of bone density on therapy.  - All patients with liver disease should avoid the use of Non-steroidal Anti-Inflammatory (NSAID) medications as they can cause significant injury to the kidneys in this population    Follow Up:  6 months     Thank you very much for the opportunity to participate in the care of this patient.  If you have any further questions, please don't hesitate to contact our office.    Thomas M. Leventhal, M.D.   of Medicine  Advanced & Transplant Hepatology  [facility]

## 2021-04-20 NOTE — LETTER
4/20/2021         RE: Steffi Hernandez  [address] [facility] [address]        Dear Colleague,    Thank you for referring your patient, Steffi Hernandez, to the [facility] [place]. Please see a copy of my visit note below.    Steffi is a 40 year old who is being evaluated via a billable video visit.      How would you like to obtain your AVS? MyChart  If the video visit is dropped, the invitation should be resent by: Text to cell phone: [phone]  Will anyone else be joining your video visit? No    Video Start Time: 1130  Video-Visit Details    Type of service:  Video Visit    Video End Time:1140    Originating Location (pt. Location): Home    Distant Location (provider location):  [facility] [place]     Platform used for Video Visit: Agitar    Date of Service: April 20, 2021     Referring Provider: Ethan    Subjective:            Steffi Hernandez is a 40 year old female presenting for evaluation of abnormal liver tests    History of Present Illness   Steffi Hernandez is a 40 year old female with past medical history of morbid obesity, obstructive sleep apnea, acne who presents in follow up for fatty liver disease.  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     She reports that she started to see a therapist in October and feels she is in a good place from a mental health perspective.     She also has noted that she has gained weight over the past few years.  Objectively, she weighed 255lbs in 4/2019 and 271 lbs in 4/2021.    Past Medical History:  Past Medical History:   Diagnosis Date     Abnormal Pap smear     Don't remember when it was and follow up clear     Abnormal Pap  smear of cervix      Allergic rhinitis, cause unspecified      Depressive disorder      Migraines      Obesity      Obstructive sleep apnea      Other acne      Polycystic ovary syndrome      Sleep apnea     No treatment at this time.     Uncomplicated asthma     Borderline - exercise induced     Unspecified urinary incontinence      Varicella        Surgical History:  Past Surgical History:   Procedure Laterality Date     BIOPSY      Lump in breast - diagnosis was fatty tumor     GENITOURINARY SURGERY      Urethrotomy     SEPTOPLASTY, TURBINOPLASTY, COMBINED N/A 2016    Procedure: COMBINED SEPTOPLASTY, TURBINOPLASTY;  Surgeon: Baldev Perez MD;  Location:  OR     [facility] NONSPECIFIC PROCEDURE      Urology surgery for night time bed wetting at age 6.       Social History:  Social History     Tobacco Use     Smoking status: Never Smoker     Smokeless tobacco: Never Used   Substance Use Topics     Alcohol use: Yes     Comment: 6 drinks weekly     Drug use: No       Family History:  Family History   Problem Relation Age of Onset     Cerebrovascular Disease Father         x2     Hypertension Father      Seizure Disorder Father      Substance Abuse Father      Diabetes Paternal Grandfather      Cancer Paternal Grandfather           from throat cancer. Also had melanoma.     Blood Disease Paternal Grandfather         B12 DEFICIENCY - WAS ON B12 SHOTS     Substance Abuse Paternal Grandfather      Obesity Paternal Grandfather      Cancer Maternal Grandmother          of colon cancer     Colon Cancer Maternal Grandmother      Substance Abuse Maternal Grandmother      Cerebrovascular Disease Maternal Grandfather      Heart Disease Maternal Grandfather               Substance Abuse Maternal Grandfather      Neurologic Disorder Sister         Epilepsy diagnosed      Neurologic Disorder Sister         Epilepsy diagnosed      Mental Illness Sister       Substance Abuse Sister      Substance Abuse Paternal Grandmother      Melanoma No family hx of      Skin Cancer No family hx of      Liver Disease No family hx of        Medications:  Current Outpatient Medications   Medication     albuterol (PROAIR HFA/PROVENTIL HFA/VENTOLIN HFA) 108 (90 Base) MCG/ACT inhaler     medroxyPROGESTERone (PROVERA) 10 MG tablet     montelukast (SINGULAIR) 10 MG tablet     No current facility-administered medications for this visit.        Review of Systems  A complete 10 point review of systems was asked and answered in the negative unless specifically commented upon in the HPI    Objective:         There were no vitals filed for this visit.  There is no height or weight on file to calculate BMI.     Physical Exam    Vitals reviewed.   Constitutional: Well-developed, well-nourished, in no apparent distress.    HEENT: Normocephalic. No scleral icterus.   Respiratory: Normal respiratory excursion   Skin:  No jaundice.  Musculoskeletal:  ROM intact  Psychiatric: Normal mood and affect. Behavior is normal.    Labs and Diagnostic tests:  Reviewed labs in EPIC  AST 72, ALT 95    Imaging:  Abdominal US 3/20/2019  Liver shows increased echogenicity and coarsened echotexture  with obscuration of the intrahepatic architecture and vasculature and  attenuation of the ultrasound beam. Visualized portions of the  pancreas are normal. The spleen is normal in size at 11.6 cm. The  gallbladder is normal. Sonographic Escobar's sign is negative. There  are no gallstones. Common duct measures 6 mm. The aorta and IVC are  normal. The right kidney measures 11.4 cm. The left kidney measures  11.1 cm. There is no hydronephrosis.      Assessment and Plan:    Abnormal Liver Tests:    -Based on her history of morbid obesity, dyslipidemia, obstructive sleep apnea, and a questionable history of polycystic ovarian syndrome: she most likely has nonalcoholic fatty liver disease.  - Lab evaluation from 5/2019 was not  "overtly suggestive of other causes of liver disease  - Will continue to attempt lifestyle modifications as per below  - If despite assertive weight loss the liver tests are still elevated, will consider liver biopsy    Non-Alcoholic Fatty Liver Disease  - I had a long discussion with the patient about nonalcoholic fatty liver disease (NAFLD).  We discussed how fat deposits in the liver, how this leads to inflammation, and how chronic inflammation (YO) can ultimately lead to scarring and cirrhosis.  The long-term complications of fatty liver disease were discussed with the patient, including the increased risk of cardiovascular disease complications,the risk of developing diabetes (if not already), and variable progression to cirrhosis and end stage liver disease.  - Regarding the management of fatty liver disease, I did discuss with the patient about an appropriate diet, exercise and weight loss plan.    - We recommend exercise regularly 3-4 times per week, with an average of about 30-45 minutes per day.   - We recommend a low-carbohydrate, low-calorie diet (4368-2927 calories per day). The weight loss plan is to lose 7-10% of body weight in the next three to six months' time.  It has shown that patients who exercise regularly can have improvement of insulin resistance and resolution of fatty liver disease, even if they are not able to lose weight.   - Diabetes management is crucial with ideal goal Hgb A1c goal of =/< 7%. If possible addition of insulin sensitizing agents like metformin will be helpful.    - Management of elevated cholesterol is also important in this population.  The use of \"statins\" (HMG-CoA reductase medications) are an effective means of therapy and are not contra-indicated in those with abnormal liver tests OR those with cirrhosis.  The value of these medications in this population far outweigh the minor risks of abnormal liver tests.  Goal LDL in those with YO are < 100 mg/dL  - We will " place a referral to the [place] [facility]    Routine Health Care in Patient with Chronic Liver Disease:  - All patients with liver disease, particularly those with cholestatic liver disease, are at an increased risk for osteoporosis.  We strongly recommend screening for Vitamin D deficiency at least twice yearly with aggressive supplementation/replacement as indicated.    - We also recommend a screening DEXA scan to evaluate for osteoporosis.  If present, should treat with calcium, Vitamin D supplementation, and recommend consideration of bisphosphonate therapy.  Also recommend follow up DEXA scans to evaluate for improvement of bone density on therapy.  - All patients with liver disease should avoid the use of Non-steroidal Anti-Inflammatory (NSAID) medications as they can cause significant injury to the kidneys in this population    Follow Up:  6 months     Thank you very much for the opportunity to participate in the care of this patient.  If you have any further questions, please don't hesitate to contact our office.    Thomas M. Leventhal, M.D.   of Medicine  Advanced & Transplant Hepatology  [facility]

## 2021-08-11 ENCOUNTER — MYC MEDICAL ADVICE (OUTPATIENT)
Dept: INTERNAL MEDICINE | Facility: CLINIC | Age: 41
End: 2021-08-11

## 2021-08-17 ENCOUNTER — VIRTUAL VISIT (OUTPATIENT)
Dept: INTERNAL MEDICINE | Facility: CLINIC | Age: 41
End: 2021-08-17
Payer: COMMERCIAL

## 2021-08-17 DIAGNOSIS — G56.02 CARPAL TUNNEL SYNDROME OF LEFT WRIST: Primary | ICD-10-CM

## 2021-08-17 PROCEDURE — 99212 OFFICE O/P EST SF 10 MIN: CPT | Mod: 95 | Performed by: NURSE PRACTITIONER

## 2021-08-17 NOTE — PROGRESS NOTES
Steffi is a 41 year old who is being evaluated via a billable video visit.      How would you like to obtain your AVS? MyChart  If the video visit is dropped, the invitation should be resent by: Send to e-mail at: [email or web address]  Will anyone else be joining your video visit? No      Video Start Time: 10:56 AM    Assessment & Plan     Carpal tunnel syndrome of left wrist  Recommend Aleve/Naproxen BID to help reduce inflammation, and wear wrist brace consistently at night and when able during the daytime. Will refer to Ortho hand/wrist for further management. She agrees with the plan.   - Orthopedic  Referral; Future    19 minutes spent on the date of the encounter doing chart review, history and exam, documentation and further activities per the note       Return if symptoms worsen or fail to improve.    LM Farr CNP  [facility] INTERNAL MEDICINE [facility]   Steffi is a 41 year old who presents for the following health issues     HPI       L hand tingling has worsened over the last week, primarily in L ring finger and L pinky finger, extends from fingers to the wrist, not up the arm. Constant tingling more than numbness. Is a stomach-sleeper, when puts hand under pillow, can worsen. Denies weakness of hand, no reduced  strength.  Wore wrist brace overnight a few times, but doesn't wear constantly, due to working and has to take off to type on the keyboard. The brace didn't seem to help when she did wear it.  Is R-hand dominant, no symptoms on the right.   Didn't take any OTC meds to treat, wasn't sure what to try.  Vitamin B12 normal in April; A1c prediabetic range in the past.  Otherwise no complaints/concerns today.    Review of Systems   Constitutional, HEENT, cardiovascular, pulmonary, gi and gu systems are negative, except as otherwise noted.      Objective           Vitals:  No vitals were obtained today due to virtual visit.    Physical Exam    GENERAL: Healthy, alert and no distress  EYES: Eyes grossly normal to inspection.  No discharge or erythema, or obvious scleral/conjunctival abnormalities.  RESP: No audible wheeze, cough, or visible cyanosis.  No visible retractions or increased work of breathing.    MS: normal range of motion of wrists, negative Phalen's test  SKIN: Visible skin clear. No significant rash, abnormal pigmentation or lesions.  NEURO: Cranial nerves grossly intact.  Mentation and speech appropriate for age.  PSYCH: Mentation appears normal, affect normal/bright, judgement and insight intact, normal speech and appearance well-groomed.  Unable to complete remainder of exam d/t virtual visit                Video-Visit Details    Type of service:  Video Visit    Video End Time:11:01 AM    Originating Location (pt. Location): Home    Distant Location (provider location):  [facility] INTERNAL MEDICINE [place]     Platform used for Video Visit: [employer]

## 2021-08-19 ENCOUNTER — ANCILLARY PROCEDURE (OUTPATIENT)
Dept: MAMMOGRAPHY | Facility: CLINIC | Age: 41
End: 2021-08-19
Attending: OBSTETRICS & GYNECOLOGY
Payer: COMMERCIAL

## 2021-08-19 DIAGNOSIS — Z12.31 VISIT FOR SCREENING MAMMOGRAM: ICD-10-CM

## 2021-08-19 PROCEDURE — 77067 SCR MAMMO BI INCL CAD: CPT

## 2021-08-19 PROCEDURE — 77067 SCR MAMMO BI INCL CAD: CPT | Mod: 26

## 2021-09-03 ENCOUNTER — OFFICE VISIT (OUTPATIENT)
Dept: ORTHOPEDICS | Facility: CLINIC | Age: 41
End: 2021-09-03
Attending: NURSE PRACTITIONER
Payer: COMMERCIAL

## 2021-09-03 VITALS
DIASTOLIC BLOOD PRESSURE: 85 MMHG | SYSTOLIC BLOOD PRESSURE: 126 MMHG | BODY MASS INDEX: 45.15 KG/M2 | WEIGHT: 271 LBS | HEIGHT: 65 IN

## 2021-09-03 DIAGNOSIS — E66.01 MORBID OBESITY (H): ICD-10-CM

## 2021-09-03 DIAGNOSIS — G56.22 ULNAR NEUROPATHY OF LEFT UPPER EXTREMITY: Primary | ICD-10-CM

## 2021-09-03 PROCEDURE — 99203 OFFICE O/P NEW LOW 30 MIN: CPT | Performed by: ORTHOPAEDIC SURGERY

## 2021-09-03 ASSESSMENT — MIFFLIN-ST. JEOR: SCORE: 1900.13

## 2021-09-03 NOTE — LETTER
9/3/2021         RE: Steffi Hernandez  [address] [facility] [address]        Dear Colleague,    Thank you for referring your patient, Steffi Hernandez, to the [facility] [place]. Please see a copy of my visit note below.    HISTORY OF PRESENT ILLNESS:    Steffi Hernandez is a 41 year old female who is seen in consultation at the request of Dr. Long for left wrist carpal tunnel syndrome.    Present symptoms: Patient is right hand dominant and presents today due to left hand numbness and tingling that has been going on since August 10th. It is predominately in the 4th and 5th finger. She started having pain in the last week that travels down the lateral arm. It is worsened by sleeping and if she sets her arm down in weird position. 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  Treatments tried to this point: Wrist brace - doesn't help, Aleve  Orthopedic PMH: none     Past Medical History:   Diagnosis Date     Abnormal Pap smear     Don't remember when it was and follow up clear     Abnormal Pap smear of cervix 2019     Allergic rhinitis, cause unspecified      Depressive disorder 2008     Migraines 1998     Obesity      Obstructive sleep apnea      Other acne      Polycystic ovary syndrome 2013     Sleep apnea     No treatment at this time.     Uncomplicated asthma     Borderline - exercise induced     Unspecified urinary incontinence      Varicella 1985       Past Surgical History:   Procedure Laterality Date     BIOPSY  2011    Lump in breast - diagnosis was fatty tumor     GENITOURINARY SURGERY  1986    Urethrotomy     SEPTOPLASTY, TURBINOPLASTY, COMBINED N/A 5/18/2016    Procedure: COMBINED SEPTOPLASTY, TURBINOPLASTY;  Surgeon: Baldev Perez MD;  Location:  OR     [facility] NONSPECIFIC PROCEDURE      Urology surgery for night time bed wetting at age 6.       Family History   Problem Relation Age of Onset     Cerebrovascular Disease Father         x2     Hypertension  Father      Seizure Disorder Father      Substance Abuse Father      Diabetes Paternal Grandfather      Cancer Paternal Grandfather           from throat cancer. Also had melanoma.     Blood Disease Paternal Grandfather         B12 DEFICIENCY - WAS ON B12 SHOTS     Substance Abuse Paternal Grandfather      Obesity Paternal Grandfather      Cancer Maternal Grandmother          of colon cancer     Colon Cancer Maternal Grandmother      Substance Abuse Maternal Grandmother      Cerebrovascular Disease Maternal Grandfather      Heart Disease Maternal Grandfather               Substance Abuse Maternal Grandfather      Neurologic Disorder Sister         Epilepsy diagnosed      Neurologic Disorder Sister         Epilepsy diagnosed      Mental Illness Sister      Substance Abuse Sister      Substance Abuse Paternal Grandmother      Melanoma No family hx of      Skin Cancer No family hx of      Liver Disease No family hx of        Social History     Socioeconomic History     Marital status: Single     Spouse name: Not on file     Number of children: Not on file     Years of education: Not on file     Highest education level: Not on file   Occupational History     Not on file   Tobacco Use     Smoking status: Never Smoker     Smokeless tobacco: Never Used   Substance and Sexual Activity     Alcohol use: Yes     Comment: 6 drinks weekly     Drug use: No     Sexual activity: Yes     Partners: Female, Male     Birth control/protection: Male Surgical     Comment: LAST MALE PARTNER 1 YEAR AGO, Currently dating a female.   Other Topics Concern     Parent/sibling w/ CABG, MI or angioplasty before 65F 55M? Not Asked   Social History Narrative     Not on file     Social Determinants of Health     Financial Resource Strain:      Difficulty of Paying Living Expenses:    Food Insecurity:      Worried About Running Out of Food in the Last Year:      Ran Out of Food in the Last Year:     Transportation Needs:      Lack of Transportation (Medical):      Lack of Transportation (Non-Medical):    Physical Activity:      Days of Exercise per Week:      Minutes of Exercise per Session:    Stress:      Feeling of Stress :    Social Connections:      Frequency of Communication with Friends and Family:      Frequency of Social Gatherings with Friends and Family:      Attends Sabianist Services:      Active Member of Clubs or Organizations:      Attends Club or Organization Meetings:      Marital Status:    Intimate Partner Violence:      Fear of Current or Ex-Partner:      Emotionally Abused:      Physically Abused:      Sexually Abused:        Current Outpatient Medications   Medication Sig Dispense Refill     albuterol (PROAIR HFA/PROVENTIL HFA/VENTOLIN HFA) 108 (90 Base) MCG/ACT inhaler Inhale 2 puffs into the lungs every 6 hours 8.5 g 2     medroxyPROGESTERone (PROVERA) 10 MG tablet Take 1 tablet every day for 10 days every 3-4 months for withdrawal bleed 10 tablet 3     montelukast (SINGULAIR) 10 MG tablet Take 1 tablet (10 mg) by mouth At Bedtime 90 tablet 3       Allergies   Allergen Reactions     Animal Dander      Cats      Dogs      Rabbit Protein      Seasonal Allergies        REVIEW OF SYSTEMS:  CONSTITUTIONAL:  NEGATIVE for fever, chills, change in weight  INTEGUMENTARY/SKIN:  NEGATIVE for worrisome rashes, moles or lesions  EYES:  NEGATIVE for vision changes or irritation  ENT/MOUTH:  NEGATIVE for ear, mouth and throat problems  RESP:  NEGATIVE for significant cough or SOB  BREAST:  NEGATIVE for masses, tenderness or discharge  CV:  NEGATIVE for chest pain, palpitations or peripheral edema  GI:  NEGATIVE for nausea, abdominal pain, heartburn, or change in bowel habits  :  Negative   MUSCULOSKELETAL:  See HPI above  NEURO:  NEGATIVE for weakness, dizziness or paresthesias  ENDOCRINE:  NEGATIVE for temperature intolerance, skin/hair changes  HEME/ALLERGY/IMMUNE:  NEGATIVE for bleeding  "problems  PSYCHIATRIC:  NEGATIVE for changes in mood or affect      PHYSICAL EXAM:  Ht 1.651 m (5' 5\")   Wt 122.9 kg (271 lb)   BMI 45.10 kg/m    Body mass index is 45.1 kg/m .   GENERAL APPEARANCE: healthy, alert and no distress   HEENT: No apparent thyroid megaly. Clear sclera with normal ocular movement  RESPIRATORY: No labored breathing  SKIN: no suspicious lesions or rashes  NEURO: Normal strength and tone, mentation intact and speech normal  VASCULAR: Good pulses, and capillary refill   LYMPH: no lymphadenopathy   PSYCH:  mentation appears normal and affect normal/bright    MUSCULOSKELETAL:  Not in acute distress  No difficulty of getting up from sitting  Normal gait    Full range of motion of neck without any pain or radiculopathy symptoms  Full range of motion of shoulders  Full strength of shoulders bilaterally  Full range of motion of the elbow with a full strength    Full range of motion of the wrist and strength  No swelling of the wrist  No swelling of the fingers    Positive Tinel's sign along the ulnar groove, left  Negative Tinel's sign at the palm    No thenar muscle atrophy both radial and ulnar  Pinching and  strength is full  Abduction and adduction strength of the fingers full, bilateral    Intact sensation to light touch but tingling sensation reported for the little and ring fingers         ASSESSMENT:  Acute ulnar nerve neuropathy most likely sleeping position related  Morbid obesity      PLAN:  We went over the pathophysiology of ulnar neuropathy. It appears that her carrying angle is not excessive but definitely she does have localized pathology at the ulnar groove with a positive Tinel's sign. Fortunately, her motor function is intact at this point and there is no atrophy.    Given the fact that he has been only 3 weeks in terms of the duration of the symptoms, further observation with the bracing of the elbow was felt to be the most reasonable and over-the-counter medications, " either Aleve or ibuprofen. R recommended.    If the problem continues or becomes much more significant it, we will recommend further evaluation with EMG and possible consideration of the surgery.    At this point, no invasive intervention was felt to be necessary.  All the questions were answered.   Follow-up if the problem persists.      Imaging Interpretation:   None taken today      José Luis Zapata MD  Department of Orthopedic Surgery        Disclaimer: This note consists of symbols derived from keyboarding, dictation and/or voice recognition software. As a result, there may be errors in the script that have gone undetected. Please consider this when interpreting information found in this chart.        Again, thank you for allowing me to participate in the care of your patient.        Sincerely,        José Luis Zapata MD

## 2021-09-03 NOTE — PROGRESS NOTES
HISTORY OF PRESENT ILLNESS:    Steffi Hernandez is a 41 year old female who is seen in consultation at the request of Dr. Long for left wrist carpal tunnel syndrome.    Present symptoms: Patient is right hand dominant and presents today due to left hand numbness and tingling that has been going on since . It is predominately in the 4th and 5th finger. She started having pain in the last week that travels down the lateral arm. It is worsened by sleeping and if she sets her arm down in weird position. 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  Treatments tried to this point: Wrist brace - doesn't help, Aleve  Orthopedic PMH: none     Past Medical History:   Diagnosis Date     Abnormal Pap smear     Don't remember when it was and follow up clear     Abnormal Pap smear of cervix      Allergic rhinitis, cause unspecified      Depressive disorder      Migraines      Obesity      Obstructive sleep apnea      Other acne      Polycystic ovary syndrome      Sleep apnea     No treatment at this time.     Uncomplicated asthma     Borderline - exercise induced     Unspecified urinary incontinence      Varicella        Past Surgical History:   Procedure Laterality Date     BIOPSY      Lump in breast - diagnosis was fatty tumor     GENITOURINARY SURGERY      Urethrotomy     SEPTOPLASTY, TURBINOPLASTY, COMBINED N/A 2016    Procedure: COMBINED SEPTOPLASTY, TURBINOPLASTY;  Surgeon: Baldev Perez MD;  Location:  OR     [facility] NONSPECIFIC PROCEDURE      Urology surgery for night time bed wetting at age 6.       Family History   Problem Relation Age of Onset     Cerebrovascular Disease Father         x2     Hypertension Father      Seizure Disorder Father      Substance Abuse Father      Diabetes Paternal Grandfather      Cancer Paternal Grandfather           from throat cancer. Also had melanoma.     Blood Disease Paternal Grandfather         B12 DEFICIENCY - WAS ON  B12 SHOTS     Substance Abuse Paternal Grandfather      Obesity Paternal Grandfather      Cancer Maternal Grandmother          of colon cancer     Colon Cancer Maternal Grandmother      Substance Abuse Maternal Grandmother      Cerebrovascular Disease Maternal Grandfather      Heart Disease Maternal Grandfather               Substance Abuse Maternal Grandfather      Neurologic Disorder Sister         Epilepsy diagnosed      Neurologic Disorder Sister         Epilepsy diagnosed      Mental Illness Sister      Substance Abuse Sister      Substance Abuse Paternal Grandmother      Melanoma No family hx of      Skin Cancer No family hx of      Liver Disease No family hx of        Social History     Socioeconomic History     Marital status: Single     Spouse name: Not on file     Number of children: Not on file     Years of education: Not on file     Highest education level: Not on file   Occupational History     Not on file   Tobacco Use     Smoking status: Never Smoker     Smokeless tobacco: Never Used   Substance and Sexual Activity     Alcohol use: Yes     Comment: 6 drinks weekly     Drug use: No     Sexual activity: Yes     Partners: Female, Male     Birth control/protection: Male Surgical     Comment: LAST MALE PARTNER 1 YEAR AGO, Currently dating a female.   Other Topics Concern     Parent/sibling w/ CABG, MI or angioplasty before 65F 55M? Not Asked   Social History Narrative     Not on file     Social Determinants of Health     Financial Resource Strain:      Difficulty of Paying Living Expenses:    Food Insecurity:      Worried About Running Out of Food in the Last Year:      Ran Out of Food in the Last Year:    Transportation Needs:      Lack of Transportation (Medical):      Lack of Transportation (Non-Medical):    Physical Activity:      Days of Exercise per Week:      Minutes of Exercise per Session:    Stress:      Feeling of Stress :    Social Connections:      Frequency of  "Communication with Friends and Family:      Frequency of Social Gatherings with Friends and Family:      Attends Orthodoxy Services:      Active Member of Clubs or Organizations:      Attends Club or Organization Meetings:      Marital Status:    Intimate Partner Violence:      Fear of Current or Ex-Partner:      Emotionally Abused:      Physically Abused:      Sexually Abused:        Current Outpatient Medications   Medication Sig Dispense Refill     albuterol (PROAIR HFA/PROVENTIL HFA/VENTOLIN HFA) 108 (90 Base) MCG/ACT inhaler Inhale 2 puffs into the lungs every 6 hours 8.5 g 2     medroxyPROGESTERone (PROVERA) 10 MG tablet Take 1 tablet every day for 10 days every 3-4 months for withdrawal bleed 10 tablet 3     montelukast (SINGULAIR) 10 MG tablet Take 1 tablet (10 mg) by mouth At Bedtime 90 tablet 3       Allergies   Allergen Reactions     Animal Dander      Cats      Dogs      Rabbit Protein      Seasonal Allergies        REVIEW OF SYSTEMS:  CONSTITUTIONAL:  NEGATIVE for fever, chills, change in weight  INTEGUMENTARY/SKIN:  NEGATIVE for worrisome rashes, moles or lesions  EYES:  NEGATIVE for vision changes or irritation  ENT/MOUTH:  NEGATIVE for ear, mouth and throat problems  RESP:  NEGATIVE for significant cough or SOB  BREAST:  NEGATIVE for masses, tenderness or discharge  CV:  NEGATIVE for chest pain, palpitations or peripheral edema  GI:  NEGATIVE for nausea, abdominal pain, heartburn, or change in bowel habits  :  Negative   MUSCULOSKELETAL:  See HPI above  NEURO:  NEGATIVE for weakness, dizziness or paresthesias  ENDOCRINE:  NEGATIVE for temperature intolerance, skin/hair changes  HEME/ALLERGY/IMMUNE:  NEGATIVE for bleeding problems  PSYCHIATRIC:  NEGATIVE for changes in mood or affect      PHYSICAL EXAM:  Ht 1.651 m (5' 5\")   Wt 122.9 kg (271 lb)   BMI 45.10 kg/m    Body mass index is 45.1 kg/m .   GENERAL APPEARANCE: healthy, alert and no distress   HEENT: No apparent thyroid megaly. Clear sclera " with normal ocular movement  RESPIRATORY: No labored breathing  SKIN: no suspicious lesions or rashes  NEURO: Normal strength and tone, mentation intact and speech normal  VASCULAR: Good pulses, and capillary refill   LYMPH: no lymphadenopathy   PSYCH:  mentation appears normal and affect normal/bright    MUSCULOSKELETAL:  Not in acute distress  No difficulty of getting up from sitting  Normal gait    Full range of motion of neck without any pain or radiculopathy symptoms  Full range of motion of shoulders  Full strength of shoulders bilaterally  Full range of motion of the elbow with a full strength    Full range of motion of the wrist and strength  No swelling of the wrist  No swelling of the fingers    Positive Tinel's sign along the ulnar groove, left  Negative Tinel's sign at the palm    No thenar muscle atrophy both radial and ulnar  Pinching and  strength is full  Abduction and adduction strength of the fingers full, bilateral    Intact sensation to light touch but tingling sensation reported for the little and ring fingers         ASSESSMENT:  Acute ulnar nerve neuropathy most likely sleeping position related  Morbid obesity      PLAN:  We went over the pathophysiology of ulnar neuropathy. It appears that her carrying angle is not excessive but definitely she does have localized pathology at the ulnar groove with a positive Tinel's sign. Fortunately, her motor function is intact at this point and there is no atrophy.    Given the fact that he has been only 3 weeks in terms of the duration of the symptoms, further observation with the bracing of the elbow was felt to be the most reasonable and over-the-counter medications, either Aleve or ibuprofen. R recommended.    If the problem continues or becomes much more significant it, we will recommend further evaluation with EMG and possible consideration of the surgery.    At this point, no invasive intervention was felt to be necessary.  All the questions  were answered.   Follow-up if the problem persists.      Imaging Interpretation:   None taken today      José Luis Zapata MD  Department of Orthopedic Surgery        Disclaimer: This note consists of symbols derived from keyboarding, dictation and/or voice recognition software. As a result, there may be errors in the script that have gone undetected. Please consider this when interpreting information found in this chart.

## 2021-09-03 NOTE — PATIENT INSTRUCTIONS
Recommend elbow padding to protect the ulnar nerve as well as to prevent deep flexion of the elbow and night    2 Aleve's twice a day    Follow-up if the problem does not get resolved in 6 to 8 weeks

## 2021-09-04 ENCOUNTER — HEALTH MAINTENANCE LETTER (OUTPATIENT)
Age: 41
End: 2021-09-04

## 2021-09-09 ENCOUNTER — MYC MEDICAL ADVICE (OUTPATIENT)
Dept: INTERNAL MEDICINE | Facility: CLINIC | Age: 41
End: 2021-09-09

## 2021-09-14 ENCOUNTER — VIRTUAL VISIT (OUTPATIENT)
Dept: INTERNAL MEDICINE | Facility: CLINIC | Age: 41
End: 2021-09-14
Payer: COMMERCIAL

## 2021-09-14 DIAGNOSIS — F33.1 MAJOR DEPRESSIVE DISORDER, RECURRENT EPISODE, MODERATE (H): Primary | ICD-10-CM

## 2021-09-14 PROCEDURE — 99213 OFFICE O/P EST LOW 20 MIN: CPT | Mod: GT | Performed by: NURSE PRACTITIONER

## 2021-09-14 NOTE — PROGRESS NOTES
Steffi is a 41 year old who is being evaluated via a billable video visit.      How would you like to obtain your AVS? MyChart  If the video visit is dropped, the invitation should be resent by: Send to e-mail at: [email or web address]  Will anyone else be joining your video visit? No      Video Start Time: 3:36 PM    Assessment & Plan     Major depressive disorder, recurrent episode, moderate (H)  Will re-start Sertraline 50 mg, if tolerating well over the first 2 weeks can increase to 100 mg. Reviewed potential side effects and expect to take 8-12 weeks to see full benefit. If insufficient response, will add on Wellbutrin  mg. Continue working with therapist, maintain social support, stress management/relaxation exercises, getting out in nature, gradually increasing physical activity, and regular sleep schedule. She agrees with the plan.  - sertraline (ZOLOFT) 50 MG tablet; Take 1 tablet (50 mg) by mouth daily    \plain  22 minutes spent on the date of the encounter doing chart review, history and exam, documentation and further activities per the note       Return in about 4 weeks (around 10/12/2021).    Corina Long, LM CNP  [facility]   Steffi is a 41 year old who presents for the following health issues     HPI       Depression--Randomly breaking into tears over the last month or two. Pandemic has been hard, but this time of year is always difficult for her, has been told that seasonal affective disorder could be contributing. Started a new job in May, so first time in 15 years not on a college campus, questioning what she's doing and misses going back for fall semester, which she always loved. 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 Is working completely remotely, and is an extrovert, so missing time with  other people.   Hasn't been coping in healthy ways. More recently has been trying to eat a little better and working on weight loss.   More low mood/low motivation, doesn't want to get out of bed, low motivation to get better at her new job, less anxiety. Denies SI or thoughts of self-harm. Feels she has a good support network.  Took medications from ~[id number], initially on Sertraline ( mg), then added Wellbutrin. Doesn't recall any side effects, just didn't want to be dependent on taking medication.  Has been going to counseling over the last year, about every 3 weeks.     Review of Systems   Constitutional, HEENT, cardiovascular, pulmonary, gi and gu systems are negative, except as otherwise noted.      Objective           Vitals:  No vitals were obtained today due to virtual visit.    Physical Exam   GENERAL: Healthy, alert and no distress  EYES: Eyes grossly normal to inspection.  No discharge or erythema, or obvious scleral/conjunctival abnormalities.  RESP: No audible wheeze, cough, or visible cyanosis.  No visible retractions or increased work of breathing.    SKIN: Visible skin clear. No significant rash, abnormal pigmentation or lesions.  NEURO: Cranial nerves grossly intact.  Mentation and speech appropriate for age.  PSYCH: Mentation appears normal, affect normal/bright, judgement and insight intact, normal speech and appearance well-groomed.                Video-Visit Details    Type of service:  Video Visit    Video End Time:3:48 PM    Originating Location (pt. Location): Home    Distant Location (provider location):  [facility] INTER[facility] MEDICINE [place]     Platform used for Video Visit: [employer]

## 2021-09-15 ENCOUNTER — TELEPHONE (OUTPATIENT)
Dept: INTERNAL MEDICINE | Facility: CLINIC | Age: 41
End: 2021-09-15

## 2021-09-15 NOTE — TELEPHONE ENCOUNTER
Left message to schedule 4 weeks follow up with Corina Long per provider last visit 9/14/21. [facility] number given to call back to schedule.

## 2021-10-13 ENCOUNTER — OFFICE VISIT (OUTPATIENT)
Dept: INTERNAL MEDICINE | Facility: CLINIC | Age: 41
End: 2021-10-13
Payer: COMMERCIAL

## 2021-10-13 VITALS
OXYGEN SATURATION: 97 % | SYSTOLIC BLOOD PRESSURE: 123 MMHG | WEIGHT: 269 LBS | HEIGHT: 65 IN | BODY MASS INDEX: 44.82 KG/M2 | DIASTOLIC BLOOD PRESSURE: 87 MMHG | HEART RATE: 83 BPM

## 2021-10-13 DIAGNOSIS — F33.8 SEASONAL AFFECTIVE DISORDER (H): ICD-10-CM

## 2021-10-13 DIAGNOSIS — R21 RASH AND NONSPECIFIC SKIN ERUPTION: Primary | ICD-10-CM

## 2021-10-13 DIAGNOSIS — F33.1 MAJOR DEPRESSIVE DISORDER, RECURRENT EPISODE, MODERATE (H): ICD-10-CM

## 2021-10-13 DIAGNOSIS — L98.9 SKIN LESION: ICD-10-CM

## 2021-10-13 PROCEDURE — 99214 OFFICE O/P EST MOD 30 MIN: CPT | Mod: 25 | Performed by: NURSE PRACTITIONER

## 2021-10-13 PROCEDURE — 90471 IMMUNIZATION ADMIN: CPT | Performed by: NURSE PRACTITIONER

## 2021-10-13 PROCEDURE — 90732 PPSV23 VACC 2 YRS+ SUBQ/IM: CPT | Performed by: NURSE PRACTITIONER

## 2021-10-13 RX ORDER — AMMONIUM LACTATE 12 G/100G
LOTION TOPICAL 2 TIMES DAILY
Qty: 222 ML | Refills: 1 | Status: SHIPPED | OUTPATIENT
Start: 2021-10-13 | End: 2023-07-07

## 2021-10-13 RX ORDER — TRIAMCINOLONE ACETONIDE 1 MG/G
OINTMENT TOPICAL 2 TIMES DAILY
Qty: 30 G | Refills: 0 | Status: SHIPPED | OUTPATIENT
Start: 2021-10-13 | End: 2023-07-07

## 2021-10-13 RX ORDER — SERTRALINE HYDROCHLORIDE 100 MG/1
100 TABLET, FILM COATED ORAL DAILY
Qty: 60 TABLET | Refills: 2 | Status: SHIPPED | OUTPATIENT
Start: 2021-10-13 | End: 2022-07-08

## 2021-10-13 ASSESSMENT — ANXIETY QUESTIONNAIRES
5. BEING SO RESTLESS THAT IT IS HARD TO SIT STILL: NOT AT ALL
6. BECOMING EASILY ANNOYED OR IRRITABLE: SEVERAL DAYS
IF YOU CHECKED OFF ANY PROBLEMS ON THIS QUESTIONNAIRE, HOW DIFFICULT HAVE THESE PROBLEMS MADE IT FOR YOU TO DO YOUR WORK, TAKE CARE OF THINGS AT HOME, OR GET ALONG WITH OTHER PEOPLE: SOMEWHAT DIFFICULT
1. FEELING NERVOUS, ANXIOUS, OR ON EDGE: SEVERAL DAYS
GAD7 TOTAL SCORE: 4
2. NOT BEING ABLE TO STOP OR CONTROL WORRYING: SEVERAL DAYS
3. WORRYING TOO MUCH ABOUT DIFFERENT THINGS: SEVERAL DAYS
7. FEELING AFRAID AS IF SOMETHING AWFUL MIGHT HAPPEN: NOT AT ALL

## 2021-10-13 ASSESSMENT — PAIN SCALES - GENERAL: PAINLEVEL: NO PAIN (0)

## 2021-10-13 ASSESSMENT — PATIENT HEALTH QUESTIONNAIRE - PHQ9
5. POOR APPETITE OR OVEREATING: NOT AT ALL
SUM OF ALL RESPONSES TO PHQ QUESTIONS 1-9: 6

## 2021-10-13 ASSESSMENT — MIFFLIN-ST. JEOR: SCORE: 1891.06

## 2021-10-13 NOTE — NURSING NOTE
Steffi Hernandez is a 41 year old female patient that presents today in clinic for the following:    Chief Complaint   Patient presents with     RECHECK     Discuss bumps on fingers and cuts on arms in addition to meds     The patient's allergies and medications were reviewed as noted. A set of vitals were recorded as noted without incident. The patient does not have any other questions for the provider.    Gloria Flores, EMT at 3:15 PM on 10/13/2021

## 2021-10-13 NOTE — PROGRESS NOTES
"  Assessment & Plan     Major depressive disorder, recurrent episode, moderate (H)  Continue with Sertraline 100 mg, she understands that with only 1 week at this dose we have not seen full benefit of the medication yet. She will continue working with her therapist on a regular basis. Encouraged good self-care and stress management.   - sertraline (ZOLOFT) 100 MG tablet; Take 1 tablet (100 mg) by mouth daily    Rash and nonspecific skin eruption  Apply Lac-Hydrin to bilateral upper arms BID.  - ammonium lactate (LAC-HYDRIN) 12 % external lotion; Apply topically 2 times daily To upper arms    Skin lesion  Itchy raised areas on pointer fingers, come and go over the last year. Apply triamcinolone ointment BID for 2 weeks, if no improvement, see Derm.  - Adult Dermatology Referral  - triamcinolone (KENALOG) 0.1 % external ointment; Apply topically 2 times daily To areas on both pointer fingers    Seasonal affective disorder (H)  Mood often worsens during winter months; recommend SAD light, reviewed instructions for use, she agrees with the plan.  - SAD Light, 10,000 Lux Order     Return in about 6 weeks (around 11/24/2021) for using a video visit.    LM Farr [facility] INTERNAL MEDICINE [place]    Hitesh Kapadia is a 41 year old who presents for the following health issues     HPI       Depression/Anxiety follow-up--had a rough week last week, had applied to a job at her old company and unfortunately did not get the position. Was at a conference at the time and had some bad interactions with coworkers as well. Last week had increased sertraline up to 100 mg, feeling okay with this, tolerating well. Continues working with therapist. She does note that symptoms worsen in winter months. She has a ceiling light in her bedroom that helps mimic the sun.    Scabs on upper arms--present x3 weeks, started as \"little itchy red dots,\" unsure what caused these. Applied Finapil antiseptic " "ointment, seems to be improving.   Also has areas on both pointer fingers present x1 year, come and go, itchy.  Physically feeling okay.       Review of Systems   Constitutional, HEENT, cardiovascular, pulmonary, gi and gu systems are negative, except as otherwise noted.      Objective    /87 (BP Location: Right arm, Patient Position: Sitting, Cuff Size: Adult Large)   Pulse 83   Ht 1.651 m (5' 5\")   Wt 122 kg (269 lb)   LMP 10/01/2021   SpO2 97%   BMI 44.76 kg/m    Body mass index is 44.76 kg/m .  Physical Exam   GENERAL: healthy, alert and no distress  RESP: lungs clear to auscultation - no rales, rhonchi or wheezes  CV: regular rate and rhythm, normal S1 S2, no S3 or S4, no murmur, click or rub, no peripheral edema and peripheral pulses strong  SKIN: scattered superficial scabbed lesions on bilateral upper arms with scant dried blood, no surrounding erythema or drainage, irregular hyperkeratotic flesh-colored lesions on lateral aspects of bilateral pointer fingers  PSYCH: mentation appears normal, affect normal/bright                DME (Durable Medical Equipment) Orders and Documentation  Orders Placed This Encounter   Procedures     SAD Light, 10,000 Lux Order      The patient was assessed and it was determined the patient is in need of the following listed DME Supplies/Equipment. Please complete supporting documentation below to demonstrate medical necessity.      DME All Other Item(s) Documentation    List reason for need and supporting documentation for medical necessity below for each DME item.     1. Seasonal affective disorder will benefit from use of light therapy box.          "

## 2021-10-14 ASSESSMENT — ASTHMA QUESTIONNAIRES: ACT_TOTALSCORE: 25

## 2021-10-14 ASSESSMENT — ANXIETY QUESTIONNAIRES: GAD7 TOTAL SCORE: 4

## 2021-11-19 ENCOUNTER — TELEPHONE (OUTPATIENT)
Dept: ORTHOPEDICS | Facility: CLINIC | Age: 41
End: 2021-11-19

## 2021-11-23 ENCOUNTER — TRANSFERRED RECORDS (OUTPATIENT)
Dept: HEALTH INFORMATION MANAGEMENT | Facility: CLINIC | Age: 41
End: 2021-11-23

## 2021-11-23 ENCOUNTER — VIRTUAL VISIT (OUTPATIENT)
Dept: INTERNAL MEDICINE | Facility: CLINIC | Age: 41
End: 2021-11-23
Payer: COMMERCIAL

## 2021-11-23 DIAGNOSIS — Z53.9 ERRONEOUS ENCOUNTER--DISREGARD: Primary | ICD-10-CM

## 2021-11-23 NOTE — Clinical Note
Hi, can you help Steffi reschedule? There was no answer on video platform or over the phone so I couldn't complete the visit.  Thanks! -Corina

## 2021-11-23 NOTE — PROGRESS NOTES
"Steffi is a 41 year old who is being evaluated via a billable video visit.      How would you like to obtain your AVS? MyChart  If the video visit is dropped, the invitation should be resent by: Send to e-mail at: [email or web address]  Will anyone else be joining your video visit? No      Video Start Time: N/A--no answer on video after sending multiple invites (text/email), no answer on telephone (called 2x)    Assessment & Plan     ERRONEOUS ENCOUNTER--DISREGARD  Visit not completed today, unable to reach pt. Will ask clinic coordinators to contact her to reschedule.         BMI:   Estimated body mass index is 44.76 kg/m  as calculated from the following:    Height as of 10/13/21: 1.651 m (5' 5\").    Weight as of 10/13/21: 122 kg (269 lb).          No follow-ups on file.    LM Farr [facility] INTERNAL MEDICINE [facility]   Steffi is a 41 year old who presents for the following health issues     HPI     Medication follow-up  Increased sertraline to 100 mg about 6-7 weeks ago.       Review of Systems         Objective           Vitals:  No vitals were obtained today due to virtual visit.    Physical Exam   N/A                Video-Visit Details    Type of service:  Video Visit      Platform used for Video Visit: Unable to complete video visit  "

## 2021-11-24 ENCOUNTER — TELEPHONE (OUTPATIENT)
Dept: INTERNAL MEDICINE | Facility: CLINIC | Age: 41
End: 2021-11-24
Payer: COMMERCIAL

## 2021-12-07 ENCOUNTER — TELEPHONE (OUTPATIENT)
Dept: ORTHOPEDICS | Facility: CLINIC | Age: 41
End: 2021-12-07
Payer: COMMERCIAL

## 2021-12-07 NOTE — TELEPHONE ENCOUNTER
EMG results of LEFT upper extremity was received from [facility].   Exam dated 11/23/21.     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 The pathology is not localizable on this study as the ulnar motor conduction velocities are normal across the ulnar groove segment. She has no electrophysiological evidence of a length-dependent sensory or motor neuropathy or cervical radiculopathy.  Clinical correlation is required.     Per ZigaV[employer] message dated 11/19/21 Dr. Zapata will call patient after completion of EMG to discuss EMG findings and further recommendations.     Routing encounter to Dr. Zaapta.     Marya Kapadia, ATC

## 2021-12-14 NOTE — TELEPHONE ENCOUNTER
I left a voice mail regarding options.  No immediate surgery is felt to be necessary for now.  Try bracing to keep the elbow straight at night first.

## 2022-02-22 ENCOUNTER — VIRTUAL VISIT (OUTPATIENT)
Dept: INTERNAL MEDICINE | Facility: CLINIC | Age: 42
End: 2022-02-22
Payer: COMMERCIAL

## 2022-02-22 DIAGNOSIS — J31.0 CHRONIC RHINITIS: Primary | ICD-10-CM

## 2022-02-22 PROCEDURE — 99213 OFFICE O/P EST LOW 20 MIN: CPT | Mod: GT | Performed by: NURSE PRACTITIONER

## 2022-02-22 RX ORDER — AZELASTINE 1 MG/ML
1 SPRAY, METERED NASAL 2 TIMES DAILY
Qty: 30 ML | Refills: 1 | Status: SHIPPED | OUTPATIENT
Start: 2022-02-22 | End: 2022-12-07

## 2022-02-22 NOTE — PROGRESS NOTES
Steffi is a 41 year old who is being evaluated via a billable video visit.      How would you like to obtain your AVS? MyChart  If the video visit is dropped, the invitation should be resent by: Send to e-mail at: [email or web address]  Will anyone else be joining your video visit? No      Video Start Time: 9:59 AM    Assessment & Plan     Chronic rhinitis  Had irritation and frequent epistaxis with Fluticasone. Can trial Azelastine as an alternative, and saline sinus rinses. Continue with daily Zyrtec and Singulair. Referral provided to ENT for further management if symptoms persist. She agrees with the plan.  - azelastine (ASTELIN) 0.1 % nasal spray; Spray 1 spray into both nostrils 2 times daily  - Otolaryngology Referral     Return if symptoms worsen or fail to improve.    LM Farr CNP  [facility] INTERNAL MEDICINE [facility]   Steffi is a 41 year old who presents for the following health issues     HPI     Persistent allergies--nasal drainage and coughing to the point of vomiting.  Every morning wakes up congested. Every day people ask her if she's sick on zoom calls.  Continues on Singulair.  Started Zyrtec daily about 1 week ago, seems to be helping some.   Saw ENT in 2016 and had septoplasty/turbinoplasty, had been sniffling constantly at that time.  Feels back in the same place. Doesn't get relief from blowing her nose. Nasal drainage is primarily clear, occasionally tinged with blood.   Has used flonase nasal spray in the past, but this caused nose bleeds when using regularly, hasn't used this in several months.   Worse around animals, dander is a trigger.   No sinus pain. Does get 2-3 sinus infections a year.     Review of Systems   Constitutional, HEENT, cardiovascular, pulmonary, gi and gu systems are negative, except as otherwise noted.      Objective           Vitals:  No vitals were obtained today due to virtual visit.    Physical Exam   GENERAL: Healthy,  alert and no distress  EYES: Eyes grossly normal to inspection.  No discharge or erythema, or obvious scleral/conjunctival abnormalities.  RESP: No audible wheeze, cough, or visible cyanosis.  No visible retractions or increased work of breathing.    SKIN: Visible skin clear. No significant rash, abnormal pigmentation or lesions.  NEURO: Cranial nerves grossly intact.  Mentation and speech appropriate for age.  PSYCH: Mentation appears normal, affect normal/bright, judgement and insight intact, normal speech and appearance well-groomed.                Video-Visit Details    Type of service:  Video Visit    Video End Time:10:08 AM    Originating Location (pt. Location): Home    Distant Location (provider location):  [facility] INTERNAL MEDICINE [place]     Platform used for Video Visit: [employer]

## 2022-02-22 NOTE — NURSING NOTE
Steffi Hernandez is a 41 year old female patient that presents today in clinic for the following:    Chief Complaint   Patient presents with     RECHECK     The patient's allergies and medications were reviewed as noted. A set of vitals were recorded as noted without incident. The patient does not have any other questions for the provider.    Saul Carter, EMT at 9:49 AM on 2/22/2022

## 2022-02-23 NOTE — TELEPHONE ENCOUNTER
FUTURE VISIT INFORMATION      FUTURE VISIT INFORMATION:    Date: 5/2/22    Time: 9:30AM    [place] [facility] [place] INFORMATION:    Referring provider:  Corina Long APRN CNP    Referring providers clinic:  [facility]     Reason for visit/diagnosis  Chronic rhinitis, referred by Corina Long APRN CNP per pt    RECORDS REQUESTED FROM:       Clinic name Comments Records Status Imaging Status   [facility]  2/22/22 note and referral from Corina Long APRN CNP [facility]  5/18/2016 septoplasty, bilateral inferior turbinate submucosal resection [facility] 7/5/16 note from Dr Baldev Perez Scanned in EPIC

## 2022-03-25 DIAGNOSIS — J30.89 CHRONIC NON-SEASONAL ALLERGIC RHINITIS: ICD-10-CM

## 2022-03-29 ENCOUNTER — MYC MEDICAL ADVICE (OUTPATIENT)
Dept: INTERNAL MEDICINE | Facility: CLINIC | Age: 42
End: 2022-03-29
Payer: COMMERCIAL

## 2022-03-29 RX ORDER — MONTELUKAST SODIUM 10 MG/1
10 TABLET ORAL AT BEDTIME
Qty: 90 TABLET | Refills: 3 | Status: SHIPPED | OUTPATIENT
Start: 2022-03-29 | End: 2023-05-04

## 2022-03-29 NOTE — TELEPHONE ENCOUNTER
MONTELUKAST 10MG TABLETS  Last Written Prescription Date:   3/16/2021  Last Fill Quantity: 90,   # refills: 3  Last Office Visit : 2/22/2022  Future Office visit:  None  90 Tabs, 3 Refills sent to pharm 3/29/2022      Delilah Nelson RN  Central Triage Red Flags/Med Refills

## 2022-05-02 ENCOUNTER — OFFICE VISIT (OUTPATIENT)
Dept: OTOLARYNGOLOGY | Facility: CLINIC | Age: 42
End: 2022-05-02
Payer: COMMERCIAL

## 2022-05-02 ENCOUNTER — PRE VISIT (OUTPATIENT)
Dept: OTOLARYNGOLOGY | Facility: CLINIC | Age: 42
End: 2022-05-02

## 2022-05-02 VITALS
BODY MASS INDEX: 45 KG/M2 | TEMPERATURE: 96.5 F | HEART RATE: 78 BPM | SYSTOLIC BLOOD PRESSURE: 141 MMHG | HEIGHT: 65 IN | WEIGHT: 270.1 LBS | OXYGEN SATURATION: 97 % | DIASTOLIC BLOOD PRESSURE: 80 MMHG

## 2022-05-02 DIAGNOSIS — J31.0 CHRONIC RHINITIS: Primary | ICD-10-CM

## 2022-05-02 PROCEDURE — 99203 OFFICE O/P NEW LOW 30 MIN: CPT | Performed by: OTOLARYNGOLOGY

## 2022-05-02 RX ORDER — CETIRIZINE HYDROCHLORIDE 10 MG/1
10 TABLET ORAL DAILY
COMMUNITY

## 2022-05-02 ASSESSMENT — PAIN SCALES - GENERAL: PAINLEVEL: MILD PAIN (2)

## 2022-05-02 NOTE — LETTER
5/2/2022       RE: Steffi Hernandez  [address] [facility] [facility] [place] [address]     Dear Colleague,    Thank you for referring your patient, Steffi Hernandez, to the [facility] NOSE [facility] TH[facility] [place] at [facility]. Please see a copy of my visit note below.    HISTORY OF PRESENT ILLNESS:  Steffi is here to see us today for the first time.  She is a 41-year-old woman with chronic problems with her nose.  She states that for the last couple of years, she has had issues with persistent obstruction, nasal drainage, chronic nose blowing.  She states that she underwent surgery.  She felt that having the surgery on her nose was helpful, but that the symptoms have returned.  She has been on nasal steroid sprays, but has had chronic problems with epistaxis.  She has no recent nasal trauma.  The symptoms tend to be worse at night and in the morning.    PAST MEDICAL HISTORY: Noted and reviewed in the chart.     FAMILY HISTORY: Noted and reviewed in the chart.     PAST SURGICAL HISTORY: Noted and reviewed in the chart.     SOCIAL HISTORY: Noted and reviewed in the chart.     REVIEW OF SYSTEMS:  Pertinent positives and negatives as above.  Otherwise, complete review of systems are reviewed in chart.    PHYSICAL EXAMINATION:  On examination, this is a 41-year-old woman in no acute distress.  Normal mood and affect, normal ability to communicate.  Alert and appropriate.  Head is normocephalic.  Cranial nerve VII is House-Brackmann I/VI bilaterally.  Breathing without difficulty or stridor.  Eyes are anicteric.  Skin of the head and neck appears normal.    Examination of the nose shows straight septum with no evidence of polyps or purulence.  Oral cavity shows normal tongue, normal buccal mucosa, normal oropharynx.  Examination of the ears show normal external auditory canals and tympanic membranes bilaterally.    ASSESSMENT:  A 41-year-old with history of chronic nasal  congestion and rhinorrhea.    PLAN:  At this point, we will get a CT scan and see her back.  If the CT did not show any substantial sinus problems, we would plan on revision turbinoplasty.      Again, thank you for allowing me to participate in the care of your patient.      Sincerely,    Krishan Cheung MD

## 2022-05-02 NOTE — PATIENT INSTRUCTIONS
"You were seen in the clinic today by Dr. Cheung. If you have any questions or concerns after your appointment, please call the clinic at [phone]. Press \"1\" for scheduling, press \"3\" for nurse advice.    2. Plan to return the clinic in 2 weeks with a CT scan prior.       Lizeth Davis [facility]  Department of Otolaryngology  [phone] Yaquelin Farley RN direct line   "

## 2022-05-02 NOTE — PROGRESS NOTES
HISTORY OF PRESENT ILLNESS:  Steffi is here to see us today for the first time.  She is a 41-year-old woman with chronic problems with her nose.  She states that for the last couple of years, she has had issues with persistent obstruction, nasal drainage, chronic nose blowing.  She states that she underwent surgery.  She felt that having the surgery on her nose was helpful, but that the symptoms have returned.  She has been on nasal steroid sprays, but has had chronic problems with epistaxis.  She has no recent nasal trauma.  The symptoms tend to be worse at night and in the morning.    PAST MEDICAL HISTORY: Noted and reviewed in the chart.     FAMILY HISTORY: Noted and reviewed in the chart.     PAST SURGICAL HISTORY: Noted and reviewed in the chart.     SOCIAL HISTORY: Noted and reviewed in the chart.     REVIEW OF SYSTEMS:  Pertinent positives and negatives as above.  Otherwise, complete review of systems are reviewed in chart.    PHYSICAL EXAMINATION:  On examination, this is a 41-year-old woman in no acute distress.  Normal mood and affect, normal ability to communicate.  Alert and appropriate.  Head is normocephalic.  Cranial nerve VII is House-Brackmann I/VI bilaterally.  Breathing without difficulty or stridor.  Eyes are anicteric.  Skin of the head and neck appears normal.    Examination of the nose shows straight septum with no evidence of polyps or purulence.  Oral cavity shows normal tongue, normal buccal mucosa, normal oropharynx.  Examination of the ears show normal external auditory canals and tympanic membranes bilaterally.    ASSESSMENT:  A 41-year-old with history of chronic nasal congestion and rhinorrhea.    PLAN:  At this point, we will get a CT scan and see her back.  If the CT did not show any substantial sinus problems, we would plan on revision turbinoplasty.

## 2022-05-27 ENCOUNTER — ANCILLARY PROCEDURE (OUTPATIENT)
Dept: CT IMAGING | Facility: CLINIC | Age: 42
End: 2022-05-27
Attending: OTOLARYNGOLOGY
Payer: COMMERCIAL

## 2022-05-27 DIAGNOSIS — J31.0 CHRONIC RHINITIS: ICD-10-CM

## 2022-05-27 PROCEDURE — 70486 CT MAXILLOFACIAL W/O DYE: CPT | Performed by: STUDENT IN AN ORGANIZED HEALTH CARE EDUCATION/TRAINING PROGRAM

## 2022-06-02 ENCOUNTER — MYC MEDICAL ADVICE (OUTPATIENT)
Dept: OBGYN | Facility: CLINIC | Age: 42
End: 2022-06-02
Payer: COMMERCIAL

## 2022-06-02 DIAGNOSIS — N92.6 IRREGULAR MENSES: ICD-10-CM

## 2022-06-08 ENCOUNTER — MYC MEDICAL ADVICE (OUTPATIENT)
Dept: OTOLARYNGOLOGY | Facility: CLINIC | Age: 42
End: 2022-06-08
Payer: COMMERCIAL

## 2022-06-09 NOTE — TELEPHONE ENCOUNTER
This writer called patient regarding a sooner appointment time due to patient's MyChart message wanting to discuss her CT results sooner with Dr. Cheung.    LVM advising patient that provider has some openings available next Monday that we could reschedule her to sooner due to the provider opening a clinic.    Left patient message with this writer's direct call back number if patient has interest in rescheduling to sooner.    This writer will allow patient to follow up with the clinic regarding sooner appointment at her convenience.    TAMIE GONZALEZ LPN on 6/9/2022 at 12:23 PM

## 2022-06-11 ENCOUNTER — HEALTH MAINTENANCE LETTER (OUTPATIENT)
Age: 42
End: 2022-06-11

## 2022-06-12 RX ORDER — MEDROXYPROGESTERONE ACETATE 10 MG
TABLET ORAL
Qty: 10 TABLET | Refills: 3 | Status: SHIPPED | OUTPATIENT
Start: 2022-06-12 | End: 2024-01-19

## 2022-07-05 DIAGNOSIS — F33.1 MAJOR DEPRESSIVE DISORDER, RECURRENT EPISODE, MODERATE (H): ICD-10-CM

## 2022-07-08 RX ORDER — SERTRALINE HYDROCHLORIDE 100 MG/1
100 TABLET, FILM COATED ORAL DAILY
Qty: 90 TABLET | Refills: 0 | Status: SHIPPED | OUTPATIENT
Start: 2022-07-08 | End: 2022-10-19

## 2022-07-08 NOTE — TELEPHONE ENCOUNTER
sertraline (ZOLOFT) 100 MG tablet  Last Written Prescription Date:   10/13/2021  Last Fill Quantity: 60,   # refills: 2  Last Office Visit :  2/22/2022  Future Office visit:  None    Routing refill request to provider for review/approval because:  Pt needing an updated PHQ-9 on file  Please call Pt to schedule or review       Delilah Nelson RN  Central Triage Red Flags/Med Refills    
no

## 2022-07-18 ENCOUNTER — VIRTUAL VISIT (OUTPATIENT)
Dept: FAMILY MEDICINE | Facility: CLINIC | Age: 42
End: 2022-07-18
Payer: COMMERCIAL

## 2022-07-18 ENCOUNTER — MYC MEDICAL ADVICE (OUTPATIENT)
Dept: INTERNAL MEDICINE | Facility: CLINIC | Age: 42
End: 2022-07-18

## 2022-07-18 DIAGNOSIS — U07.1 INFECTION DUE TO 2019 NOVEL CORONAVIRUS: Primary | ICD-10-CM

## 2022-07-18 PROCEDURE — 99214 OFFICE O/P EST MOD 30 MIN: CPT | Mod: GT | Performed by: INTERNAL MEDICINE

## 2022-07-18 ASSESSMENT — PATIENT HEALTH QUESTIONNAIRE - PHQ9
SUM OF ALL RESPONSES TO PHQ QUESTIONS 1-9: 5
10. IF YOU CHECKED OFF ANY PROBLEMS, HOW DIFFICULT HAVE THESE PROBLEMS MADE IT FOR YOU TO DO YOUR WORK, TAKE CARE OF THINGS AT HOME, OR GET ALONG WITH OTHER PEOPLE: SOMEWHAT DIFFICULT
SUM OF ALL RESPONSES TO PHQ QUESTIONS 1-9: 5

## 2022-07-18 NOTE — PROGRESS NOTES
"Answers for HPI/ROS submitted by the patient on 7/18/2022  If you checked off any problems, how difficult have these problems made it for you to do your work, take care of things at home, or get along with other people?: Somewhat difficult  PHQ9 TOTAL SCORE: 5    Steffi is a 41 year old who is being evaluated via a billable video visit.      How would you like to obtain your AVS? MyChart  If the video visit is dropped, the invitation should be resent by: Send to e-mail at: [email or web address]  Will anyone else be joining your video visit? No          Assessment & Plan     Infection due to 2019 novel coronavirus  Discussed approach to treatment, with underlying asthma, will start treatment with paxlovid, discussed different treatment options and warning signs to watch for. Requested [facility]- called and verified with   - nirmatrelvir and ritonavir (PAXLOVID) therapy pack; Take 3 tablets by mouth 2 times daily for 5 days Take 2 Nirmatrelvir tablets and 1 Ritonavir tablet twice daily for 5 days.    Patient Instructions   We will start treatment with paxlovid- twice per day for 5 days.       If you are having a hard time breathing with shortness of breath, chest pain, or not drinking enough fluids- you should be seen in person for further evaluation.     Watch for new symptoms after feeling better such as sinus pressure or new productive cough as these may be symptoms of secondary bacterial infection following the COVID.     You should quarantine for 5 days from symptoms/diagnosis, if feeling better- can be in public with a mask. You must quarantine for 10 days before returning to public without a mask.     Let us know if issues come up.    Edwin Cannon MD                 BMI:   Estimated body mass index is 44.95 kg/m  as calculated from the following:    Height as of 5/2/22: 1.651 m (5' 5\").    Weight as of 5/2/22: 122.5 kg (270 lb 1.6 oz).   Weight management plan: not addressed today    See Patient " Instructions    Return in about 1 week (around 7/25/2022), or if symptoms worsen or fail to improve.    Edwin Cannon MD  [facility] LA[place]ISHA Kapadia is a 41 year old, presenting for the following health issues:  Covid 19 Testing (Consult about covid treatment )      HPI       COVID-19 Symptom Review  How many days ago did these symptoms start? Saturday 3 days ago.     Are any of the following symptoms significant for you?    New or worsening difficulty breathing? Slightly, dose have asthma and allergies. Albuterol about once per day.     Worsening cough? Yes, it's a dry cough.     Fever or chills? Yes, I felt feverish or had chills.    Headache: YES    Sore throat: YES    Chest pain: No    Diarrhea: No    Body aches? YES    What treatments has patient tried? Vitamin D, vitamin C and zinc and advil.    Does patient live in a nursing home, group home, or shelter? No  Does patient have a way to get food/medications during quarantined? Yes, I have a friend or family member who can help me.                  Review of Systems   Constitutional, HEENT, cardiovascular, pulmonary, gi and gu systems are negative, except as otherwise noted.      Objective           Vitals:  No vitals were obtained today due to virtual visit.    Physical Exam   GENERAL: Healthy, alert and no distress- decreased voice with covid infection  EYES: Eyes grossly normal to inspection.  No discharge or erythema, or obvious scleral/conjunctival abnormalities.  RESP: No audible wheeze, cough, or visible cyanosis.  No visible retractions or increased work of breathing.    SKIN: Visible skin clear. No significant rash, abnormal pigmentation or lesions.  NEURO: Cranial nerves grossly intact.  Mentation and speech appropriate for age.  PSYCH: Mentation appears normal, affect normal/bright, judgement and insight intact, normal speech and appearance well-groomed.                Video-Visit Details    Video Start  Time: 530    Type of service:  Video Visit    Video End Time:6 pm    Originating Location (pt. Location): Home    Distant Location (provider location):  [facility]     Platform used for Video Visit: [employer]    Vivian Luu

## 2022-07-18 NOTE — PATIENT INSTRUCTIONS
We will start treatment with paxlovid- twice per day for 5 days.       If you are having a hard time breathing with shortness of breath, chest pain, or not drinking enough fluids- you should be seen in person for further evaluation.     Watch for new symptoms after feeling better such as sinus pressure or new productive cough as these may be symptoms of secondary bacterial infection following the COVID.     You should quarantine for 5 days from symptoms/diagnosis, if feeling better- can be in public with a mask. You must quarantine for 10 days before returning to public without a mask.     Let us know if issues come up.    Edwin Cannon MD

## 2022-07-25 ENCOUNTER — PREP FOR PROCEDURE (OUTPATIENT)
Dept: OTOLARYNGOLOGY | Facility: CLINIC | Age: 42
End: 2022-07-25

## 2022-07-25 ENCOUNTER — OFFICE VISIT (OUTPATIENT)
Dept: OTOLARYNGOLOGY | Facility: CLINIC | Age: 42
End: 2022-07-25
Payer: COMMERCIAL

## 2022-07-25 VITALS
SYSTOLIC BLOOD PRESSURE: 133 MMHG | OXYGEN SATURATION: 94 % | DIASTOLIC BLOOD PRESSURE: 88 MMHG | TEMPERATURE: 98 F | BODY MASS INDEX: 44.48 KG/M2 | HEIGHT: 65 IN | WEIGHT: 267 LBS | HEART RATE: 3 BPM

## 2022-07-25 DIAGNOSIS — J34.89 NASAL OBSTRUCTION: Primary | ICD-10-CM

## 2022-07-25 PROCEDURE — 99213 OFFICE O/P EST LOW 20 MIN: CPT | Performed by: OTOLARYNGOLOGY

## 2022-07-25 ASSESSMENT — PAIN SCALES - GENERAL: PAINLEVEL: NO PAIN (0)

## 2022-07-25 NOTE — NURSING NOTE
"Chief Complaint   Patient presents with     RECHECK     2 week follow up      Blood pressure 133/88, pulse (!) 3, temperature 98  F (36.7  C), height 1.651 m (5' 5\"), weight 121.1 kg (267 lb), SpO2 94 %, not currently breastfeeding.    Randy Lama LPN'    "

## 2022-07-25 NOTE — PROGRESS NOTES
HISTORY OF PRESENT ILLNESS:  The patient is back to see us again today.  She continues to have nasal obstruction.    IMAGING:  We reviewed her CT scan, which does demonstrate a septal deviation and turbinate hypertrophy.    ASSESSMENT/PLAN:  We discussed options for treatment of this.  She is interested in improving her nasal breathing.  She has had inadequate improvement with medical management.  Therefore, we discussed the risks and benefits of septoplasty and turbinate reduction.  She is interested in pursuing this will schedule at her convenience.    Twenty minutes was spent with the patient, on chart review and on documentation on the date of the visit.

## 2022-07-25 NOTE — PATIENT INSTRUCTIONS
"You were seen in the clinic today by Dr. Cheung. If you have any questions or concerns after your appointment, please call the clinic at [phone]. Press \"1\" for scheduling, press \"3\" for nurse advice.       Enoc Cid RN   [facility]  Department of Otolaryngology        Surgery Teaching Surgery Teaching      1.You must have a physical exam (called  history and physical ) within 30 days of surgery. You can do this at the PAC clinic or your family clinic. Bring the Pre-Op Surgery Form (found in the surgery packet that you received in the mail) with you to your primary doctor if you chose to have them complete this. If your doctor is in the [facility], they do not need this form.     2. Ask your doctor what medicines are safe before surgery.          * If you are on any blood-thinners, we will need to make a plan with your INR clinic or prescribing doctor of when you need to stop these medications before surgery    3. NO MOTRIN, IBUPROFEN, ASPIRIN, ALEVE, GARLIC SUPPLEMENTS or FISH OIL x 7 days prior to surgery ( to prevent excess bleeding and bruising at time of surgery).    4. For same-day surgery, you must arrange for an adult to take you home from the Center. An adult must stay with you for the first 24 hours after surgery. You cannot drive for 24 hours, or longer if you are still taking narcotic medications.      5. Stop drinking alcohol at least 24 hours before surgery.      6. Stop or at least cut down on smoking 24 hours before surgery.     7. Take a bath or shower the night before and the morning of surgery (refer to surgery packet for extra information). You should use the soap that we mailed to you or gave in clinic (2 small bottles). Use the entire bottle of soap for each shower, washing from the neck down, then rinsing off. Sleep in clean clothing and use clean bedding sheets. If your doctor does not give you special soap, buy Hibiclens or Mildred-Stat at the drug store or ask " the pharmacist to suggest a brand. Do not put on lotion, powder, perfume, deodorant or make-up after bathing.     8. You can eat a normal meal the night before surgery. Do not eat any solid foods or drink any milk products for 8 hours before surgery. A pre-op nurse will call you the week before surgery to confirm your eating cut-off time, but please listen to this 8-hour rule if you miss their call.     9. You may drink clear liquids until 2 hours before surgery. Clear liquids include water, Gatorade, apple juice, or other liquids you can read through.    10.  If you need FMLA paperwork filled out for your surgery, please send this to us before surgery if you are able (in-person, fax, or mail). DO NOT give this to the pre-op nurses on the day of surgery or it will get lost.    11. Generally, we recommend 1 week off of work for healing after surgery. If you have a labor-intensive job with heavy lifting, you may need a work-modification and we are able to give you a work letter for this so that you can continue to work.

## 2022-07-29 ENCOUNTER — TELEPHONE (OUTPATIENT)
Dept: OTOLARYNGOLOGY | Facility: CLINIC | Age: 42
End: 2022-07-29

## 2022-07-29 PROBLEM — J34.89 NASAL OBSTRUCTION: Status: ACTIVE | Noted: 2022-07-29

## 2022-07-29 NOTE — TELEPHONE ENCOUNTER
Called patient to schedule surgery with Dr. Cheung    Date of Surgery: 1/2/23    Location of surgery: [facility]    Pre-Op H&P: PCP    Imaging Scheduled:  Not Applicable    Discussed COVID-19 Testing: Yes    Post-Op Appt Date: 1 week    Surgery Packet Mailed: 7/29      Additional comments: RIMA Borrego on 7/29/2022 at 10:38 AM

## 2022-08-22 ENCOUNTER — HOSPITAL ENCOUNTER (OUTPATIENT)
Dept: MAMMOGRAPHY | Facility: CLINIC | Age: 42
Discharge: HOME | End: 2022-08-22
Attending: NURSE PRACTITIONER | Admitting: NURSE PRACTITIONER
Payer: COMMERCIAL

## 2022-08-22 DIAGNOSIS — Z12.31 VISIT FOR SCREENING MAMMOGRAM: ICD-10-CM

## 2022-08-22 PROCEDURE — 77067 SCR MAMMO BI INCL CAD: CPT

## 2022-10-05 ENCOUNTER — MYC MEDICAL ADVICE (OUTPATIENT)
Dept: INTERNAL MEDICINE | Facility: CLINIC | Age: 42
End: 2022-10-05

## 2022-10-16 DIAGNOSIS — F33.1 MAJOR DEPRESSIVE DISORDER, RECURRENT EPISODE, MODERATE (H): ICD-10-CM

## 2022-10-19 ENCOUNTER — MYC REFILL (OUTPATIENT)
Dept: INTERNAL MEDICINE | Facility: CLINIC | Age: 42
End: 2022-10-19

## 2022-10-19 DIAGNOSIS — F33.1 MAJOR DEPRESSIVE DISORDER, RECURRENT EPISODE, MODERATE (H): ICD-10-CM

## 2022-10-20 RX ORDER — SERTRALINE HYDROCHLORIDE 100 MG/1
100 TABLET, FILM COATED ORAL DAILY
Qty: 90 TABLET | Refills: 0 | Status: SHIPPED | OUTPATIENT
Start: 2022-10-20 | End: 2023-02-10

## 2022-10-20 RX ORDER — SERTRALINE HYDROCHLORIDE 100 MG/1
TABLET, FILM COATED ORAL
Qty: 90 TABLET | Refills: 0 | OUTPATIENT
Start: 2022-10-20

## 2022-10-20 NOTE — TELEPHONE ENCOUNTER
Last Clinic Visit: 2/22/2022  [facility] [place]  Next appointment 12-7-2022

## 2022-10-22 ENCOUNTER — HEALTH MAINTENANCE LETTER (OUTPATIENT)
Age: 42
End: 2022-10-22

## 2022-12-05 ASSESSMENT — ENCOUNTER SYMPTOMS
HEMATURIA: 0
SEIZURES: 0
FEVER: 0
ARTHRALGIAS: 0
DECREASED APPETITE: 0
POLYDIPSIA: 0
INCREASED ENERGY: 0
DYSURIA: 0
TINGLING: 1
DEPRESSION: 1
MYALGIAS: 1
STIFFNESS: 0
DIFFICULTY URINATING: 0
FATIGUE: 1
WEIGHT LOSS: 0
POLYPHAGIA: 0
PANIC: 0
WEIGHT GAIN: 0
JOINT SWELLING: 0
DIZZINESS: 0
NUMBNESS: 0
PARALYSIS: 0
NECK PAIN: 1
FLANK PAIN: 0
MEMORY LOSS: 0
WEAKNESS: 0
ALTERED TEMPERATURE REGULATION: 0
MUSCLE WEAKNESS: 0
INSOMNIA: 1
CHILLS: 0
SPEECH CHANGE: 0
HALLUCINATIONS: 0
HEADACHES: 0
NERVOUS/ANXIOUS: 1
DECREASED LIBIDO: 1
LOSS OF CONSCIOUSNESS: 0
MUSCLE CRAMPS: 0
HOT FLASHES: 1
NIGHT SWEATS: 1
TREMORS: 0
DISTURBANCES IN COORDINATION: 0
BACK PAIN: 1
DECREASED CONCENTRATION: 0

## 2022-12-07 ENCOUNTER — OFFICE VISIT (OUTPATIENT)
Dept: INTERNAL MEDICINE | Facility: CLINIC | Age: 42
End: 2022-12-07
Payer: COMMERCIAL

## 2022-12-07 ENCOUNTER — LAB (OUTPATIENT)
Dept: LAB | Facility: CLINIC | Age: 42
End: 2022-12-07
Payer: COMMERCIAL

## 2022-12-07 VITALS
WEIGHT: 262 LBS | HEIGHT: 65 IN | SYSTOLIC BLOOD PRESSURE: 138 MMHG | OXYGEN SATURATION: 98 % | HEART RATE: 81 BPM | RESPIRATION RATE: 16 BRPM | DIASTOLIC BLOOD PRESSURE: 88 MMHG | BODY MASS INDEX: 43.65 KG/M2

## 2022-12-07 DIAGNOSIS — Z01.818 PREOP GENERAL PHYSICAL EXAM: Primary | ICD-10-CM

## 2022-12-07 DIAGNOSIS — K76.0 NAFLD (NONALCOHOLIC FATTY LIVER DISEASE): ICD-10-CM

## 2022-12-07 DIAGNOSIS — F51.04 PSYCHOPHYSIOLOGICAL INSOMNIA: ICD-10-CM

## 2022-12-07 DIAGNOSIS — Z01.818 PREOP GENERAL PHYSICAL EXAM: ICD-10-CM

## 2022-12-07 LAB
ALBUMIN SERPL BCG-MCNC: 4.1 G/DL (ref 3.5–5.2)
ALP SERPL-CCNC: 100 U/L (ref 35–104)
ALT SERPL W P-5'-P-CCNC: 128 U/L (ref 10–35)
ANION GAP SERPL CALCULATED.3IONS-SCNC: 12 MMOL/L (ref 7–15)
AST SERPL W P-5'-P-CCNC: 166 U/L (ref 10–35)
BILIRUB SERPL-MCNC: 0.5 MG/DL
BUN SERPL-MCNC: 13.1 MG/DL (ref 6–20)
CALCIUM SERPL-MCNC: 9.6 MG/DL (ref 8.6–10)
CHLORIDE SERPL-SCNC: 104 MMOL/L (ref 98–107)
CREAT SERPL-MCNC: 0.8 MG/DL (ref 0.51–0.95)
DEPRECATED HCO3 PLAS-SCNC: 25 MMOL/L (ref 22–29)
GFR SERPL CREATININE-BSD FRML MDRD: >90 ML/MIN/1.73M2
GLUCOSE SERPL-MCNC: 110 MG/DL (ref 70–99)
HBA1C MFR BLD: 5.9 %
HGB BLD-MCNC: 13.7 G/DL (ref 11.7–15.7)
POTASSIUM SERPL-SCNC: 4.2 MMOL/L (ref 3.4–5.3)
PROT SERPL-MCNC: 8.3 G/DL (ref 6.4–8.3)
SODIUM SERPL-SCNC: 141 MMOL/L (ref 136–145)

## 2022-12-07 PROCEDURE — 99214 OFFICE O/P EST MOD 30 MIN: CPT | Performed by: NURSE PRACTITIONER

## 2022-12-07 PROCEDURE — 80053 COMPREHEN METABOLIC PANEL: CPT | Performed by: PATHOLOGY

## 2022-12-07 PROCEDURE — 85018 HEMOGLOBIN: CPT | Performed by: PATHOLOGY

## 2022-12-07 PROCEDURE — 36415 COLL VENOUS BLD VENIPUNCTURE: CPT | Performed by: PATHOLOGY

## 2022-12-07 PROCEDURE — 83036 HEMOGLOBIN GLYCOSYLATED A1C: CPT | Performed by: NURSE PRACTITIONER

## 2022-12-07 RX ORDER — TRAZODONE HYDROCHLORIDE 50 MG/1
50 TABLET, FILM COATED ORAL AT BEDTIME
Qty: 30 TABLET | Refills: 3 | Status: SHIPPED | OUTPATIENT
Start: 2022-12-07

## 2022-12-07 ASSESSMENT — ASTHMA QUESTIONNAIRES: ACT_TOTALSCORE: 23

## 2022-12-07 NOTE — NURSING NOTE
"Steffi Hernandez is a 42 year old female patient that presents today in clinic for the following:    Chief Complaint   Patient presents with     Pre-Op Exam     The patient's allergies and medications were reviewed as noted. A set of vitals were recorded as noted without incident: /88 (BP Location: Right arm, Patient Position: Sitting, Cuff Size: Adult Large)   Pulse 81   Resp 16   Ht 1.651 m (5' 5\")   Wt 118.8 kg (262 lb)   LMP 07/01/2022 (Approximate)   SpO2 98%   Breastfeeding No   BMI 43.60 kg/m  . The patient does not have any other questions for the provider.    Antoni Rachel, EMT  3:42 PM 12/7/2022  "

## 2022-12-07 NOTE — H&P (VIEW-ONLY)
[facility] INTERNAL MEDICINE [address] [address]  Phone: [phone]  Fax: [phone]  Primary Provider: Brody Guillen  Pre-op Performing Provider: BRODY GUILLEN      PREOPERATIVE EVALUATION:  Today's date: 12/7/2022    Steffi Hernandez is a 42 year old female who presents for a preoperative evaluation.    Surgical Information:  Surgery/Procedure: Septoplasty, nose bilateral Turbinate Reduction, bilateral  Surgery Location: [place] [address]  Surgeon: Dr. Krishan Cheung  Surgery Date: 01/02/2023  Time of Surgery: 12:00 PM  Where patient plans to recover: At home with family  Fax number for surgical facility: Note does not need to be faxed, will be available electronically in Epic.    Type of Anesthesia Anticipated: General    Assessment & Plan     The proposed surgical procedure is considered LOW risk.    Preop general physical exam  Patient is optimized for above procedure.  - Comprehensive metabolic panel; Future  - Hemoglobin; Future    Psychophysiological insomnia  May use trazodone to help with sleep.   - traZODone (DESYREL) 50 MG tablet; Take 1 tablet (50 mg) by mouth At Bedtime    NAFLD (nonalcoholic fatty liver disease)  Rise in LFTs compared to last year. Screen A1c. Recommend scheduling follow-up with Hepatology.   - Comprehensive metabolic panel; Future  - Hemoglobin A1c; Future  - Adult GI  Referral - Consult Only; Future    Risks and Recommendations:  The patient has the following additional risks and recommendations for perioperative complications:   - Morbid obesity (BMI >40)  Obstructive Sleep Apnea:   Remote history, not on CPAP, monitor respiratory status closely in recovery phase    Medication Instructions:   - SSRIs, SNRIs, TCAs, Antipsychotics: Continue without modification.     RECOMMENDATION:  APPROVAL GIVEN to proceed with proposed procedure, without further diagnostic evaluation.    37 minutes spent on the  date of the encounter doing chart review, history and exam, documentation and further activities per the note      Subjective     HPI related to upcoming procedure: Chronic rhinitis and congestion; recent CT scan demonstrated a septal deviation to the right and turbinate hypertrophy. Can't breathe through the right nostril.     Struggling with difficulty sleeping, not sure if related to sinuses. Has tried melatonin 10 mg, magnesium 250 mg, has tried eliminating alcohol, 5 mg THC (only thing that seems to help a little, relaxes a couple times per week). Hard to fall asleep. Has done meditative podcasts, doesn't feel tired, but sometimes doesn't fall asleep until 6 am. Once she goes to sleep is fine, and denies frequent wakenings.   Had seen [facility] for sleep study, had diagnosed with LANDRY ([id number]), had recommended CPAP, but is a stomach sleeper so didn't adjust well to that.   Has had some issues with nighttime incontinence, doesn't always wake to go. Had an issue in the past. No period since July. Stopped when she hit puberty, now wonders if related to pre-menopausal. Plans to talk with Gyn Dr. Diana. 3 instances of incontinence in the last month. Has had urinary urgency for her whole life, stress incontinence with laugh/cough/sneezing. No prior hx pregnancy.     Mood--good on Sertraline 100 mg. Doesn't love her job currently but not struggling like she was before restarting it.      Preop Questions 12/5/2022   1. Have you ever had a heart attack or stroke? No   2. Have you ever had surgery on your heart or blood vessels, such as a stent placement, a coronary artery bypass, or surgery on an artery in your head, neck, heart, or legs? No   3. Do you have chest pain with activity? No   4. Do you have a history of  heart failure? No   5. Do you currently have a cold, bronchitis or symptoms of other infection? No   6. Do you have a cough, shortness of breath, or wheezing? No   7. Do you or anyone in your family have  previous history of blood clots? YES - Dad with history of 2 strokes, not sure regarding clotting disorder but is on blood thinners.   8. Do you or does anyone in your family have a serious bleeding problem such as prolonged bleeding following surgeries or cuts? YES - Father is on Coumadin, not a genetic prolonged bleeding.    9. Have you ever had problems with anemia or been told to take iron pills? No   10. Have you had any abnormal blood loss such as black, tarry or bloody stools, or abnormal vaginal bleeding? No   11. Have you ever had a blood transfusion? No   12. Are you willing to have a blood transfusion if it is medically needed before, during, or after your surgery? Yes   13. Have you or any of your relatives ever had problems with anesthesia? No   14. Do you have sleep apnea, excessive snoring or daytime drowsiness? YES - Hx LANDRY, did not tolerate CPAP   14a. Do you have a CPAP machine? No   15. Do you have any artifical heart valves or other implanted medical devices like a pacemaker, defibrillator, or continuous glucose monitor? No   16. Do you have artificial joints? No   17. Are you allergic to latex? No   18. Is there any chance that you may be pregnant? No       Health Care Directive:  Patient does not have a Health Care Directive or Living Will: Discussed advance care planning with patient; information given to patient to review.    Preoperative Review of :   reviewed - no record of controlled substances prescribed.        Review of Systems  Constitutional, neuro, ENT, endocrine, pulmonary, cardiac, gastrointestinal, genitourinary, musculoskeletal, integument and psychiatric systems are negative, except as otherwise noted.    Patient Active Problem List    Diagnosis Date Noted     Nasal obstruction 07/29/2022     Priority: Medium     Added automatically from request for surgery [id number]       Obesity      Priority: Medium     Obstructive sleep apnea      Priority: Medium     Encounter for  long-term (current) use of high-risk medication 01/27/2019     Priority: Medium     Deviated nasal septum 05/19/2016     Priority: Medium     Sleep apnea, obstructive 05/18/2016     Priority: Medium     Morbid obesity due to excess calories (H) 05/16/2016     Priority: Medium     LANDRY (obstructive sleep apnea) 05/06/2016     Priority: Medium     Severe LANDRY with AHI of 64.7/hr, prescribed CPAP       Major depressive disorder, recurrent episode, moderate (HCC) 01/21/2016     Priority: Medium     Morbid obesity, unspecified obesity type (H) 01/21/2016     Priority: Medium     Chronic fatigue 01/21/2016     Priority: Medium     Menorrhagia with regular cycle 01/21/2016     Priority: Medium     Allergic rhinitis, unspecified allergic rhinitis type 01/21/2016     Priority: Medium     Family history of diabetes mellitus 05/14/2015     Priority: Medium     Dysmenorrhea 05/01/2014     Priority: Medium     Vitamin B12 deficiency without anemia 06/11/2013     Priority: Medium     Diagnosis updated by automated process. Provider to review and confirm.       Vitamin D deficiency 06/11/2013     Priority: Medium     Ascorbic acid deficiency 06/11/2013     Priority: Medium     Diagnosis updated by automated process. Provider to review and confirm.       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     Priority: Medium     Fatigue 05/13/2013     Priority: Medium     Heavy menses 05/13/2013     Priority: Medium     Memory difficulty 05/13/2013     Priority: Medium     Allergic rhinosinusitis 05/13/2013     Priority: Medium     CARDIOVASCULAR SCREENING; LDL GOAL LESS THAN 160 05/13/2013     Priority: Medium     Acne 05/13/2013     Priority: Medium     Acute maxillary sinusitis 05/13/2013     Priority: Medium      Past Medical History:   Diagnosis Date     Abnormal Pap smear     Don't  remember when it was and follow up clear     Abnormal Pap smear of cervix 2019     Allergic rhinitis, cause unspecified      Depressive disorder 2008     Migraines 1998     Obesity      Obstructive sleep apnea      Other acne      Polycystic ovary syndrome 2013     Sleep apnea     No treatment at this time.     Uncomplicated asthma     Borderline - exercise induced     Unspecified urinary incontinence      Varicella 1985     Past Surgical History:   Procedure Laterality Date     BIOPSY  2011    Lump in breast - diagnosis was fatty tumor     GENITOURINARY SURGERY  1986    Urethrotomy     SEPTOPLASTY, TURBINOPLASTY, COMBINED N/A 5/18/2016    Procedure: COMBINED SEPTOPLASTY, TURBINOPLASTY;  Surgeon: Baldev Perez MD;  Location:  OR     [facility] NONSPECIFIC PROCEDURE      Urology surgery for night time bed wetting at age 6.     Current Outpatient Medications   Medication Sig Dispense Refill     albuterol (PROAIR HFA/PROVENTIL HFA/VENTOLIN HFA) 108 (90 Base) MCG/ACT inhaler Inhale 2 puffs into the lungs every 6 hours 8.5 g 2     ammonium lactate (LAC-HYDRIN) 12 % external lotion Apply topically 2 times daily To upper arms 222 mL 1     cetirizine (ZYRTEC) 10 MG tablet Take 10 mg by mouth daily       medroxyPROGESTERone (PROVERA) 10 MG tablet Take 1 tablet every day for 10 days every 3-4 months for withdrawal bleed 10 tablet 3     montelukast (SINGULAIR) 10 MG tablet Take 1 tablet (10 mg) by mouth At Bedtime 90 tablet 3     sertraline (ZOLOFT) 100 MG tablet Take 1 tablet (100 mg) by mouth daily 90 tablet 0     traZODone (DESYREL) 50 MG tablet Take 1 tablet (50 mg) by mouth At Bedtime 30 tablet 3     triamcinolone (KENALOG) 0.1 % external ointment Apply topically 2 times daily To areas on both pointer fingers 30 g 0       Allergies   Allergen Reactions     Animal Dander      Cats      Dogs      Rabbit Protein      Seasonal Allergies         Social History     Tobacco Use     Smoking status: Never     Smokeless tobacco:  "Never   Substance Use Topics     Alcohol use: Yes     Comment: 6 drinks weekly       History   Drug Use No         Objective     /88 (BP Location: Right arm, Patient Position: Sitting, Cuff Size: Adult Large)   Pulse 81   Resp 16   Ht 1.651 m (5' 5\")   Wt 118.8 kg (262 lb)   LMP 07/01/2022 (Approximate)   SpO2 98%   Breastfeeding No   BMI 43.60 kg/m      Physical Exam    GENERAL APPEARANCE: healthy, alert and no distress     EYES: EOMI, PERRL     HENT: ear canals and TM's normal and nose and mouth without ulcers or lesions     NECK: no adenopathy, no asymmetry, masses, or scars and thyroid normal to palpation     RESP: lungs clear to auscultation - no rales, rhonchi or wheezes     CV: regular rates and rhythm, normal S1 S2, no S3 or S4 and no murmur, click or rub     ABDOMEN:  soft, nontender, no HSM or masses and bowel sounds normal     MS: extremities normal- no gross deformities noted, no evidence of inflammation in joints, FROM in all extremities.     SKIN: no suspicious lesions or rashes     NEURO: Normal strength and tone, sensory exam grossly normal, mentation intact and speech normal     PSYCH: mentation appears normal. and affect normal/bright     LYMPHATICS: No cervical adenopathy    Recent Labs   Lab Test 04/08/21  0732   HGB 12.1      INR 0.97      POTASSIUM 3.9   CR 0.80        Diagnostics:  Recent Results (from the past 168 hour(s))   Hemoglobin A1c    Collection Time: 12/07/22  4:42 PM   Result Value Ref Range    Hemoglobin A1C 5.9 (H) <5.7 %   Hemoglobin    Collection Time: 12/07/22  4:42 PM   Result Value Ref Range    Hemoglobin 13.7 11.7 - 15.7 g/dL   Comprehensive metabolic panel    Collection Time: 12/07/22  4:42 PM   Result Value Ref Range    Sodium 141 136 - 145 mmol/L    Potassium 4.2 3.4 - 5.3 mmol/L    Chloride 104 98 - 107 mmol/L    Carbon Dioxide (CO2) 25 22 - 29 mmol/L    Anion Gap 12 7 - 15 mmol/L    Urea Nitrogen 13.1 6.0 - 20.0 mg/dL    Creatinine 0.80 0.51 - " 0.95 mg/dL    Calcium 9.6 8.6 - 10.0 mg/dL    Glucose 110 (H) 70 - 99 mg/dL    Alkaline Phosphatase 100 35 - 104 U/L     (H) 10 - 35 U/L     (H) 10 - 35 U/L    Protein Total 8.3 6.4 - 8.3 g/dL    Albumin 4.1 3.5 - 5.2 g/dL    Bilirubin Total 0.5 <=1.2 mg/dL    GFR Estimate >90 >60 mL/min/1.73m2      No EKG required, no history of coronary heart disease, significant arrhythmia, peripheral arterial disease or other structural heart disease.    Revised Cardiac Risk Index (RCRI):  The patient has the following serious cardiovascular risks for perioperative complications:   - No serious cardiac risks = 0 points     RCRI Interpretation: 0 points: Class I (very low risk - 0.4% complication rate)           Signed Electronically by: LM Farr CNP  Copy of this evaluation report is provided to requesting physician.

## 2022-12-07 NOTE — PATIENT INSTRUCTIONS
"  Honoring Choices - Your Rights: Making Your Own Health Care Treatment Decisions  [place] Law:  [place] law allows you to inform others of your health care wishes. You have the right to state your wishes or appoint an agent in writing so that others will know what you want if you can't tell them because of illness or injury. The information that follows tells about health care directives and how to prepare them. It does not give every detail of the law.  What is a health care directive?  A health care directive is a written document that informs others of your wishes about health care. It allows you to name a person (\"agent\") to decide for you if you are unable to decide. It also allows you to name an agent if you want someone else to decide for you while you still have capacity. You must be at least 18 years old to make a health care directive.  Why have a health care directive?  A health care directive is important if your attending physician determines you can't communicate your health care choices (because of physical or mental incapacity). It is also important if you wish to have someone else make your health care decisions. In some circumstances, your directive may state that you want someone other than an attending physician to decide when you cannot make your own decisions.  Must I have a health care directive? What happens if I don't have one?  You don't have to have a health care directive. But, writing one helps to make sure your wishes are followed. You will still receive medical treatment if you don't have a written directive. Health care providers will listen to what people close to you say about your treatment preferences, but the best way to be sure your wishes are followed is to have a health care directive.  How do I make a health care directive?  There are forms for health care directives. You don't have to use a form, but your health care directive must meet the following requirements to be " legal:    Be in writing, dated, and state your name.    Be signed by you or someone you authorize to sign for you when you can understand and communicate your health care wishes.    Have your signature verified by a  or two witnesses (notaries and witnesses cannot also be named as agent).    Include the appointment of an agent to make health care decisions for you and/or instructions about the health care choices you wish to make.  Before you prepare or revise your directive, you should discuss your health care wishes with your doctor or other health care provider. Information about where to get health care directive forms is given at the end of this document.  What can I put in a health care directive?  You have many choices of what to put in your health care directive. For example, you may include:    The person you trust as your agent to make health care decisions for you. You can name alternate agents, in case the first agent is unavailable, or joint agents.    Your goals, values, preferences, and cultural beliefs about health care.    The types of medical treatment you would want (or not want).    How you want your agent or agents to decide.    Where you want to receive care.    Instructions about artificial nutrition and hydration.    Mental health treatments that use electroshock therapy or neuroleptic medications.    Instructions if you are pregnant.    Donation of organs, tissues and eyes.     arrangements.    Who you would like as your guardian or conservator if there is a court action.  You may be as specific or as general as you wish. You can choose which issues or treatments to deal with in your health care directive.  Are there any limits to what I can put in my health care directive?  There are some limits about what you can put in your health care directive. For instance:    Your agent must be at least 18 years of age.    Your agent cannot be your health care provider, unless the  health care provider is a family member or you give reasons for the naming of the agent in your directive.    You cannot request health care treatment that is outside of reasonable medical practice.    You cannot request assisted suicide.  How long does a health care directive last? Can I change it?  Your health care directive lasts until you change or cancel it. As long as the changes meet the health care directive requirements listed above, you may cancel your directive by any of the following:    A written statement saying you want to cancel it    Destroying it    Telling at least two other people you want to cancel it    Writing a new health care directive.  What should I do with my health care directive after I have signed it?  You should inform others of your health care directive and give people copies of it. You may wish to inform family members, your health care agent or agents, and your health care providers that you have a health care directive. You should give them a copy. It's a good idea to review and update your directive as your needs change. Keep it in a safe place where it is easily found.   We are committed to making your health care wishes known. You may give a copy of your directive to any care team member or bring or mail a copy to any of our locations, and we will keep it in your medical record.  What if I've already prepared a health care document? Is it still good?  Before August 1, 1998, [place] law provided for several other types of directives, including living blank, durable health care reyes of  and mental health declarations. The law changed so people can use one form for all their health care instructions. Forms created before August 1, 1998 are still legal if they followed the law in effect when written. They are also legal if they meet the requirements of the new law (described above). You may want to review any existing documents to make sure they say what you want and  meet all requirements.  I prepared my directive in another state. Is it still good?  Health care directives prepared in other states are legal if they meet the requirements of the other state's laws or the [place] requirements. But requests for assisted suicide will not be followed.  What if my health care provider refuses to follow my health care directive?  Your health care provider generally will follow your health care directive, or any instructions from your agent, as long as the health care follows reasonable medical practice. But, you or your agent cannot request treatment that will not help you or which the provider cannot provide. If the provider cannot follow the agent's directions about life-sustaining treatment, the provider must inform the agent. The provider must also document the notice in your medical record. The provider must allow the agency to arrange to transfer you to another provider who will follow the agent's directions.  What if I believe a health care provider has not followed health care directive requirements?  Complaints of this type can be filed with the Office of Health Facility Complaints at [phone] ([facility]) or toll free at [phone].  What if I believe a health plan has not followed health care directive requirements?  Complaints of this type can be filed with the [place] Health Information Clearinghouse at [phone] or toll free at [phone].  How to obtain more information  Ask any care team member for information, forms, or how to register for a free class on advance care planning and creating a health care directive. Or you may:   visit [email or web address],   email [email or web address] or   call [phone].  Find other health care directive forms at [place] [employer]'s Senior LinkAge Line: www.[place]aging.net or call 1-199.325.7018.   For informational purposes only. Not to replace the advice of your health care provider.  Copyright    2012 [facility]. All rights reserved. [employer] 1626 - REV .        Preparing for Your Surgery  Getting started  A nurse will call you to review your health history and instructions. They will give you an arrival time based on your scheduled surgery time. Please be ready to share:    Your doctor's clinic name and phone number    Your medical, surgical, and anesthesia history    A list of allergies and sensitivities    A list of medicines, including herbal treatments and over-the-counter drugs    Whether the patient has a legal guardian (ask how to send us the papers in advance)  Please tell us if you're pregnant--or if there's any chance you might be pregnant. Some surgeries may injure a fetus (unborn baby), so they require a pregnancy test. Surgeries that are safe for a fetus don't always need a test, and you can choose whether to have one.   If you have a child who's having surgery, please ask for a copy of Preparing for Your Child's Surgery.    Preparing for surgery    Within 10 to 30 days of surgery: Have a pre-op exam (sometimes called an H&P, or History and Physical). This can be done at a clinic or pre-operative center.  ? If you're having a , you may not need this exam. Talk to your care team.    At your pre-op exam, talk to your care team about all medicines you take. If you need to stop any medicines before surgery, ask when to start taking them again.  ? We do this for your safety. Many medicines can make you bleed too much during surgery. Some change how well surgery (anesthesia) drugs work.    Call your insurance company to let them know you're having surgery. (If you don't have insurance, call [phone].)    Call your clinic if there's any change in your health. This includes signs of a cold or flu (sore throat, runny nose, cough, rash, fever). It also includes a scrape or scratch near the surgery site.    If you have questions on the day of surgery, call your hospital  or surgery center.  COVID testing  You may need to be tested for COVID-19 before having surgery. If so, we will give you instructions (or click here).  Eating and drinking guidelines  For your safety: Unless your surgeon tells you otherwise, follow the guidelines below.    Eat and drink as usual until 8 hours before you arrive for surgery. After that, no food or milk.    Drink clear liquids until 2 hours before you arrive. These are liquids you can see through, like water, Gatorade, and Propel Water. They also include plain black coffee and tea (no cream or milk), candy, and breath mints. You can spit out gum when you arrive.    If you drink alcohol: Stop drinking it the night before surgery.    If your care team tells you to take medicine on the morning of surgery, it's okay to take it with a sip of water.  Preventing infection    Shower or bathe the night before and morning of your surgery. Follow the instructions your clinic gave you. (If no instructions, use regular soap.)    Don't shave or clip hair near your surgery site. We'll remove the hair if needed.    Don't smoke or vape the morning of surgery. You may chew nicotine gum up to 2 hours before surgery. A nicotine patch is okay.  ? Note: Some surgeries require you to completely quit smoking and nicotine. Check with your surgeon.    Your care team will make every effort to keep you safe from infection. We will:  ? Clean our hands often with soap and water (or an alcohol-based hand rub).  ? Clean the skin at your surgery site with a special soap that kills germs.  ? Give you a special gown to keep you warm. (Cold raises the risk of infection.)  ? Wear special hair covers, masks, gowns and gloves during surgery.  ? Give antibiotic medicine, if prescribed. Not all surgeries need antibiotics.  What to bring on the day of surgery    Photo ID and insurance card    Copy of your health care directive, if you have one    Glasses and hearing aids (bring cases)  ? You  can't wear contacts during surgery    Inhaler and eye drops, if you use them (tell us about these when you arrive)    CPAP machine or breathing device, if you use them    A few personal items, if spending the night    If you have . . .  ? A pacemaker, ICD (cardiac defibrillator) or other implant: Bring the ID card.  ? An implanted stimulator: Bring the remote control.  ? A legal guardian: Bring a copy of the certified (court-stamped) guardianship papers.  Please remove any jewelry, including body piercings. Leave jewelry and other valuables at home.  If you're going home the day of surgery    You must have a responsible adult drive you home. They should stay with you overnight as well.    If you don't have someone to stay with you, and you aren't safe to go home alone, we may keep you overnight. Insurance often won't pay for this.  After surgery  If it's hard to control your pain or you need more pain medicine, please call your surgeon's office.  Questions?   If you have any questions for your care team, list them here: _________________________________________________________________________________________________________________________________________________________________________ ____________________________________ ____________________________________ ____________________________________  For informational purposes only. Not to replace the advice of your health care provider. Copyright   2003, 2019 [employer]. All rights reserved. Clinically reviewed by Ashley Duarte MD. [employer] 094431 - REV 10/22.    For informational purposes only. Not to replace the advice of your health care provider. Copyright   2003, 2019 [employer]. All rights reserved. Clinically reviewed by Ashley Duarte MD. [employer] M[employer] IT 199970 - REV 12/22.  Preparing for Your Surgery  Getting started  A nurse will call you to review your health history and instructions. They will give you an arrival time based on your  scheduled surgery time. Please be ready to share:    Your doctor's clinic name and phone number    Your medical, surgical, and anesthesia history    A list of allergies and sensitivities    A list of medicines, including herbal treatments and over-the-counter drugs    Whether the patient has a legal guardian (ask how to send us the papers in advance)  Please tell us if you're pregnant--or if there's any chance you might be pregnant. Some surgeries may injure a fetus (unborn baby), so they require a pregnancy test. Surgeries that are safe for a fetus don't always need a test, and you can choose whether to have one.   If you have a child who's having surgery, please ask for a copy of Preparing for Your Child's Surgery.    Preparing for surgery    Within 10 to 30 days of surgery: Have a pre-op exam (sometimes called an H&P, or History and Physical). This can be done at a clinic or pre-operative center.  ? If you're having a , you may not need this exam. Talk to your care team.    At your pre-op exam, talk to your care team about all medicines you take. If you need to stop any medicines before surgery, ask when to start taking them again.  ? We do this for your safety. Many medicines can make you bleed too much during surgery. Some change how well surgery (anesthesia) drugs work.    Call your insurance company to let them know you're having surgery. (If you don't have insurance, call [phone].)    Call your clinic if there's any change in your health. This includes signs of a cold or flu (sore throat, runny nose, cough, rash, fever). It also includes a scrape or scratch near the surgery site.    If you have questions on the day of surgery, call your hospital or surgery center.  Eating and drinking guidelines  For your safety: Unless your surgeon tells you otherwise, follow the guidelines below.    Eat and drink as usual until 8 hours before you arrive for surgery. After that, no food or milk.    Drink clear  liquids until 2 hours before you arrive. These are liquids you can see through, like water, Gatorade, and Propel Water. They also include plain black coffee and tea (no cream or milk), candy, and breath mints. You can spit out gum when you arrive.    If you drink alcohol: Stop drinking it the night before surgery.    If your care team tells you to take medicine on the morning of surgery, it's okay to take it with a sip of water.  Preventing infection    Shower or bathe the night before and morning of your surgery. Follow the instructions your clinic gave you. (If no instructions, use regular soap.)    Don't shave or clip hair near your surgery site. We'll remove the hair if needed.    Don't smoke or vape the morning of surgery. You may chew nicotine gum up to 2 hours before surgery. A nicotine patch is okay.  ? Note: Some surgeries require you to completely quit smoking and nicotine. Check with your surgeon.    Your care team will make every effort to keep you safe from infection. We will:  ? Clean our hands often with soap and water (or an alcohol-based hand rub).  ? Clean the skin at your surgery site with a special soap that kills germs.  ? Give you a special gown to keep you warm. (Cold raises the risk of infection.)  ? Wear special hair covers, masks, gowns and gloves during surgery.  ? Give antibiotic medicine, if prescribed. Not all surgeries need antibiotics.  What to bring on the day of surgery    Photo ID and insurance card    Copy of your health care directive, if you have one    Glasses and hearing aids (bring cases)  ? You can't wear contacts during surgery    Inhaler and eye drops, if you use them (tell us about these when you arrive)    CPAP machine or breathing device, if you use them    A few personal items, if spending the night    If you have . . .  ? A pacemaker, ICD (cardiac defibrillator) or other implant: Bring the ID card.  ? An implanted stimulator: Bring the remote control.  ? A legal  guardian: Bring a copy of the certified (court-stamped) guardianship papers.  Please remove any jewelry, including body piercings. Leave jewelry and other valuables at home.  If you're going home the day of surgery    You must have a responsible adult drive you home. They should stay with you overnight as well.    If you don't have someone to stay with you, and you aren't safe to go home alone, we may keep you overnight. Insurance often won't pay for this.  After surgery  If it's hard to control your pain or you need more pain medicine, please call your surgeon's office.  Questions?   If you have any questions for your care team, list them here: _________________________________________________________________________________________________________________________________________________________________________ ____________________________________ ____________________________________ ____________________________________
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
## 2022-12-07 NOTE — LETTER
12/7/2022      RE: Steffi Hernandez  [address] [facility] [address]       Steffi Hernandez presents on 12/7/2022 for a pre-operative exam.    Patient Name: Steffi Hernandez  Location of Surgery: [facility] OR OR 04  Surgeon: Krishan Cheung MD  Type of Surgery or Procedure: SEPTOPLASTY, NOSE bilateral Turbinate Reduction, bilateral  Date and Time of Surgery or Procedure: 01/02/2023 at 12:00 PM  Have you or anyone in your family ever had problems with anesthesia or sedation? Eboni Rachel, EMT  3:37 PM 12/7/2022    [facility] INTERNAL MEDICINE [address] FLOOR  [facility] [address]  Phone: [phone]  Fax: [phone]  Primary Provider: Brody Guillen  Pre-op Performing Provider: BRODY GUILLEN      PREOPERATIVE EVALUATION:  Today's date: 12/7/2022    Steffi Hernandez is a 42 year old female who presents for a preoperative evaluation.    Surgical Information:  Surgery/Procedure: Septoplasty, nose bilateral Turbinate Reduction, bilateral  Surgery Location: [facility] OR   Surgeon: Dr. Krishan Cheung  Surgery Date: 01/02/2023  Time of Surgery: 12:00 PM  Where patient plans to recover: At home with family  Fax number for surgical facility: Note does not need to be faxed, will be available electronically in Epic.    Type of Anesthesia Anticipated: General    Assessment & Plan     The proposed surgical procedure is considered LOW risk.    Preop general physical exam  Patient is optimized for above procedure.  - Comprehensive metabolic panel; Future  - Hemoglobin; Future    Psychophysiological insomnia  May use trazodone to help with sleep.   - traZODone (DESYREL) 50 MG tablet; Take 1 tablet (50 mg) by mouth At Bedtime    NAFLD (nonalcoholic fatty liver disease)  Rise in LFTs compared to last year. Screen A1c. Recommend scheduling follow-up with Hepatology.   - Comprehensive metabolic panel; Future  - Hemoglobin A1c; Future  - Adult GI  Referral -  Consult Only; Future    Risks and Recommendations:  The patient has the following additional risks and recommendations for perioperative complications:   - Morbid obesity (BMI >40)  Obstructive Sleep Apnea:   Remote history, not on CPAP, monitor respiratory status closely in recovery phase    Medication Instructions:   - SSRIs, SNRIs, TCAs, Antipsychotics: Continue without modification.     RECOMMENDATION:  APPROVAL GIVEN to proceed with proposed procedure, without further diagnostic evaluation.    37 minutes spent on the date of the encounter doing chart review, history and exam, documentation and further activities per the note      Subjective     HPI related to upcoming procedure: Chronic rhinitis and congestion; recent CT scan demonstrated a septal deviation to the right and turbinate hypertrophy. Can't breathe through the right nostril.     Struggling with difficulty sleeping, not sure if related to sinuses. Has tried melatonin 10 mg, magnesium 250 mg, has tried eliminating alcohol, 5 mg THC (only thing that seems to help a little, relaxes a couple times per week). Hard to fall asleep. Has done meditative podcasts, doesn't feel tired, but sometimes doesn't fall asleep until 6 am. Once she goes to sleep is fine, and denies frequent wakenings.   Had seen [facility] for sleep study, had diagnosed with LANDRY ([id number]), had recommended CPAP, but is a stomach sleeper so didn't adjust well to that.   Has had some issues with nighttime incontinence, doesn't always wake to go. Had an issue in the past. No period since July. Stopped when she hit puberty, now wonders if related to pre-menopausal. Plans to talk with Gyn Dr. Diana. 3 instances of incontinence in the last month. Has had urinary urgency for her whole life, stress incontinence with laugh/cough/sneezing. No prior hx pregnancy.     Mood--good on Sertraline 100 mg. Doesn't love her job currently but not struggling like she was before restarting it.      Preop  Questions 12/5/2022   1. Have you ever had a heart attack or stroke? No   2. Have you ever had surgery on your heart or blood vessels, such as a stent placement, a coronary artery bypass, or surgery on an artery in your head, neck, heart, or legs? No   3. Do you have chest pain with activity? No   4. Do you have a history of  heart failure? No   5. Do you currently have a cold, bronchitis or symptoms of other infection? No   6. Do you have a cough, shortness of breath, or wheezing? No   7. Do you or anyone in your family have previous history of blood clots? YES - Dad with history of 2 strokes, not sure regarding clotting disorder but is on blood thinners.   8. Do you or does anyone in your family have a serious bleeding problem such as prolonged bleeding following surgeries or cuts? YES - Father is on Coumadin, not a genetic prolonged bleeding.    9. Have you ever had problems with anemia or been told to take iron pills? No   10. Have you had any abnormal blood loss such as black, tarry or bloody stools, or abnormal vaginal bleeding? No   11. Have you ever had a blood transfusion? No   12. Are you willing to have a blood transfusion if it is medically needed before, during, or after your surgery? Yes   13. Have you or any of your relatives ever had problems with anesthesia? No   14. Do you have sleep apnea, excessive snoring or daytime drowsiness? YES - Hx LANDRY, did not tolerate CPAP   14a. Do you have a CPAP machine? No   15. Do you have any artifical heart valves or other implanted medical devices like a pacemaker, defibrillator, or continuous glucose monitor? No   16. Do you have artificial joints? No   17. Are you allergic to latex? No   18. Is there any chance that you may be pregnant? No       Health Care Directive:  Patient does not have a Health Care Directive or Living Will: Discussed advance care planning with patient; information given to patient to review.    Preoperative Review of :   reviewed -  no record of controlled substances prescribed.        Review of Systems  Constitutional, neuro, ENT, endocrine, pulmonary, cardiac, gastrointestinal, genitourinary, musculoskeletal, integument and psychiatric systems are negative, except as otherwise noted.    Patient Active Problem List    Diagnosis Date Noted     Nasal obstruction 07/29/2022     Priority: Medium     Added automatically from request for surgery [id number]       Obesity      Priority: Medium     Obstructive sleep apnea      Priority: Medium     Encounter for long-term (current) use of high-risk medication 01/27/2019     Priority: Medium     Deviated nasal septum 05/19/2016     Priority: Medium     Sleep apnea, obstructive 05/18/2016     Priority: Medium     Morbid obesity due to excess calories (H) 05/16/2016     Priority: Medium     LANDRY (obstructive sleep apnea) 05/06/2016     Priority: Medium     Severe LANDRY with AHI of 64.7/hr, prescribed CPAP       Major depressive disorder, recurrent episode, moderate (HCC) 01/21/2016     Priority: Medium     Morbid obesity, unspecified obesity type (H) 01/21/2016     Priority: Medium     Chronic fatigue 01/21/2016     Priority: Medium     Menorrhagia with regular cycle 01/21/2016     Priority: Medium     Allergic rhinitis, unspecified allergic rhinitis type 01/21/2016     Priority: Medium     Family history of diabetes mellitus 05/14/2015     Priority: Medium     Dysmenorrhea 05/01/2014     Priority: Medium     Vitamin B12 deficiency without anemia 06/11/2013     Priority: Medium     Diagnosis updated by automated process. Provider to review and confirm.       Vitamin D deficiency 06/11/2013     Priority: Medium     Ascorbic acid deficiency 06/11/2013     Priority: Medium     Diagnosis updated by automated process. Provider to review and confirm.       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     Priority: Medium     Fatigue 05/13/2013     Priority: Medium     Heavy menses 05/13/2013     Priority: Medium     Memory difficulty 05/13/2013     Priority: Medium     Allergic rhinosinusitis 05/13/2013     Priority: Medium     CARDIOVASCULAR SCREENING; LDL GOAL LESS THAN 160 05/13/2013     Priority: Medium     Acne 05/13/2013     Priority: Medium     Acute maxillary sinusitis 05/13/2013     Priority: Medium      Past Medical History:   Diagnosis Date     Abnormal Pap smear     Don't remember when it was and follow up clear     Abnormal Pap smear of cervix 2019     Allergic rhinitis, cause unspecified      Depressive disorder 2008     Migraines 1998     Obesity      Obstructive sleep apnea      Other acne      Polycystic ovary syndrome 2013     Sleep apnea     No treatment at this time.     Uncomplicated asthma     Borderline - exercise induced     Unspecified urinary incontinence      Varicella 1985     Past Surgical History:   Procedure Laterality Date     BIOPSY  2011    Lump in breast - diagnosis was fatty tumor     GENITOURINARY SURGERY  1986    Urethrotomy     SEPTOPLASTY, TURBINOPLASTY, COMBINED N/A 5/18/2016    Procedure: COMBINED SEPTOPLASTY, TURBINOPLASTY;  Surgeon: Baldev Perez MD;  Location:  OR     [facility] NONSPECIFIC PROCEDURE      Urology surgery for night time bed wetting at age 6.     Current Outpatient Medications   Medication Sig Dispense Refill     albuterol (PROAIR HFA/PROVENTIL HFA/VENTOLIN HFA) 108 (90 Base) MCG/ACT inhaler Inhale 2 puffs into the lungs every 6 hours 8.5 g 2     ammonium lactate (LAC-HYDRIN) 12 % external lotion Apply topically 2 times daily To upper arms 222 mL 1     cetirizine (ZYRTEC) 10 MG tablet Take 10 mg by mouth daily       medroxyPROGESTERone (PROVERA) 10 MG tablet Take 1 tablet every day for 10 days every 3-4 months for withdrawal bleed 10 tablet 3     montelukast (SINGULAIR) 10 MG tablet Take 1 tablet (10 mg) by mouth At  "Bedtime 90 tablet 3     sertraline (ZOLOFT) 100 MG tablet Take 1 tablet (100 mg) by mouth daily 90 tablet 0     traZODone (DESYREL) 50 MG tablet Take 1 tablet (50 mg) by mouth At Bedtime 30 tablet 3     triamcinolone (KENALOG) 0.1 % external ointment Apply topically 2 times daily To areas on both pointer fingers 30 g 0       Allergies   Allergen Reactions     Animal Dander      Cats      Dogs      Rabbit Protein      Seasonal Allergies         Social History     Tobacco Use     Smoking status: Never     Smokeless tobacco: Never   Substance Use Topics     Alcohol use: Yes     Comment: 6 drinks weekly       History   Drug Use No        Objective     /88 (BP Location: Right arm, Patient Position: Sitting, Cuff Size: Adult Large)   Pulse 81   Resp 16   Ht 1.651 m (5' 5\")   Wt 118.8 kg (262 lb)   LMP 07/01/2022 (Approximate)   SpO2 98%   Breastfeeding No   BMI 43.60 kg/m      Physical Exam    GENERAL APPEARANCE: healthy, alert and no distress     EYES: EOMI, PERRL     HENT: ear canals and TM's normal and nose and mouth without ulcers or lesions     NECK: no adenopathy, no asymmetry, masses, or scars and thyroid normal to palpation     RESP: lungs clear to auscultation - no rales, rhonchi or wheezes     CV: regular rates and rhythm, normal S1 S2, no S3 or S4 and no murmur, click or rub     ABDOMEN:  soft, nontender, no HSM or masses and bowel sounds normal     MS: extremities normal- no gross deformities noted, no evidence of inflammation in joints, FROM in all extremities.     SKIN: no suspicious lesions or rashes     NEURO: Normal strength and tone, sensory exam grossly normal, mentation intact and speech normal     PSYCH: mentation appears normal. and affect normal/bright     LYMPHATICS: No cervical adenopathy    Recent Labs   Lab Test 04/08/21  0732   HGB 12.1      INR 0.97      POTASSIUM 3.9   CR 0.80        Diagnostics:  Recent Results (from the past 168 hour(s))   Hemoglobin A1c    " Collection Time: 12/07/22  4:42 PM   Result Value Ref Range    Hemoglobin A1C 5.9 (H) <5.7 %   Hemoglobin    Collection Time: 12/07/22  4:42 PM   Result Value Ref Range    Hemoglobin 13.7 11.7 - 15.7 g/dL   Comprehensive metabolic panel    Collection Time: 12/07/22  4:42 PM   Result Value Ref Range    Sodium 141 136 - 145 mmol/L    Potassium 4.2 3.4 - 5.3 mmol/L    Chloride 104 98 - 107 mmol/L    Carbon Dioxide (CO2) 25 22 - 29 mmol/L    Anion Gap 12 7 - 15 mmol/L    Urea Nitrogen 13.1 6.0 - 20.0 mg/dL    Creatinine 0.80 0.51 - 0.95 mg/dL    Calcium 9.6 8.6 - 10.0 mg/dL    Glucose 110 (H) 70 - 99 mg/dL    Alkaline Phosphatase 100 35 - 104 U/L     (H) 10 - 35 U/L     (H) 10 - 35 U/L    Protein Total 8.3 6.4 - 8.3 g/dL    Albumin 4.1 3.5 - 5.2 g/dL    Bilirubin Total 0.5 <=1.2 mg/dL    GFR Estimate >90 >60 mL/min/1.73m2      No EKG required, no history of coronary heart disease, significant arrhythmia, peripheral arterial disease or other structural heart disease.    Revised Cardiac Risk Index (RCRI):  The patient has the following serious cardiovascular risks for perioperative complications:   - No serious cardiac risks = 0 points     RCRI Interpretation: 0 points: Class I (very low risk - 0.4% complication rate)    Signed Electronically by: LM Farr CNP  Copy of this evaluation report is provided to requesting physician.      LM Farr CNP

## 2022-12-07 NOTE — PROGRESS NOTES
[facility] INTERNAL MEDICINE [address] [address]  Phone: [phone]  Fax: [phone]  Primary Provider: Brody Guillen  Pre-op Performing Provider: BRODY GUILLEN      PREOPERATIVE EVALUATION:  Today's date: 12/7/2022    Steffi Hernandez is a 42 year old female who presents for a preoperative evaluation.    Surgical Information:  Surgery/Procedure: Septoplasty, nose bilateral Turbinate Reduction, bilateral  Surgery Location: [place] [address]  Surgeon: Dr. Krishan Cheung  Surgery Date: 01/02/2023  Time of Surgery: 12:00 PM  Where patient plans to recover: At home with family  Fax number for surgical facility: Note does not need to be faxed, will be available electronically in Epic.    Type of Anesthesia Anticipated: General    Assessment & Plan     The proposed surgical procedure is considered LOW risk.    Preop general physical exam  Patient is optimized for above procedure.  - Comprehensive metabolic panel; Future  - Hemoglobin; Future    Psychophysiological insomnia  May use trazodone to help with sleep.   - traZODone (DESYREL) 50 MG tablet; Take 1 tablet (50 mg) by mouth At Bedtime    NAFLD (nonalcoholic fatty liver disease)  Rise in LFTs compared to last year. Screen A1c. Recommend scheduling follow-up with Hepatology.   - Comprehensive metabolic panel; Future  - Hemoglobin A1c; Future  - Adult GI  Referral - Consult Only; Future    Risks and Recommendations:  The patient has the following additional risks and recommendations for perioperative complications:   - Morbid obesity (BMI >40)  Obstructive Sleep Apnea:   Remote history, not on CPAP, monitor respiratory status closely in recovery phase    Medication Instructions:   - SSRIs, SNRIs, TCAs, Antipsychotics: Continue without modification.     RECOMMENDATION:  APPROVAL GIVEN to proceed with proposed procedure, without further diagnostic evaluation.    37 minutes spent on the  date of the encounter doing chart review, history and exam, documentation and further activities per the note      Subjective     HPI related to upcoming procedure: Chronic rhinitis and congestion; recent CT scan demonstrated a septal deviation to the right and turbinate hypertrophy. Can't breathe through the right nostril.     Struggling with difficulty sleeping, not sure if related to sinuses. Has tried melatonin 10 mg, magnesium 250 mg, has tried eliminating alcohol, 5 mg THC (only thing that seems to help a little, relaxes a couple times per week). Hard to fall asleep. Has done meditative podcasts, doesn't feel tired, but sometimes doesn't fall asleep until 6 am. Once she goes to sleep is fine, and denies frequent wakenings.   Had seen Sleep Med for sleep study, had diagnosed with LANDRY ([id number]), had recommended CPAP, but is a stomach sleeper so didn't adjust well to that.   Has had some issues with nighttime incontinence, doesn't always wake to go. Had an issue in the past. No period since July. Stopped when she hit puberty, now wonders if related to pre-menopausal. Plans to talk with Gyn Dr. Diana. 3 instances of incontinence in the last month. Has had urinary urgency for her whole life, stress incontinence with laugh/cough/sneezing. No prior hx pregnancy.     Mood--good on Sertraline 100 mg. Doesn't love her job currently but not struggling like she was before restarting it.      Preop Questions 12/5/2022   1. Have you ever had a heart attack or stroke? No   2. Have you ever had surgery on your heart or blood vessels, such as a stent placement, a coronary artery bypass, or surgery on an artery in your head, neck, heart, or legs? No   3. Do you have chest pain with activity? No   4. Do you have a history of  heart failure? No   5. Do you currently have a cold, bronchitis or symptoms of other infection? No   6. Do you have a cough, shortness of breath, or wheezing? No   7. Do you or anyone in your family have  previous history of blood clots? YES - Dad with history of 2 strokes, not sure regarding clotting disorder but is on blood thinners.   8. Do you or does anyone in your family have a serious bleeding problem such as prolonged bleeding following surgeries or cuts? YES - Father is on Coumadin, not a genetic prolonged bleeding.    9. Have you ever had problems with anemia or been told to take iron pills? No   10. Have you had any abnormal blood loss such as black, tarry or bloody stools, or abnormal vaginal bleeding? No   11. Have you ever had a blood transfusion? No   12. Are you willing to have a blood transfusion if it is medically needed before, during, or after your surgery? Yes   13. Have you or any of your relatives ever had problems with anesthesia? No   14. Do you have sleep apnea, excessive snoring or daytime drowsiness? YES - Hx LANDRY, did not tolerate CPAP   14a. Do you have a CPAP machine? No   15. Do you have any artifical heart valves or other implanted medical devices like a pacemaker, defibrillator, or continuous glucose monitor? No   16. Do you have artificial joints? No   17. Are you allergic to latex? No   18. Is there any chance that you may be pregnant? No       Health Care Directive:  Patient does not have a Health Care Directive or Living Will: Discussed advance care planning with patient; information given to patient to review.    Preoperative Review of :   reviewed - no record of controlled substances prescribed.        Review of Systems  Constitutional, neuro, ENT, endocrine, pulmonary, cardiac, gastrointestinal, genitourinary, musculoskeletal, integument and psychiatric systems are negative, except as otherwise noted.    Patient Active Problem List    Diagnosis Date Noted     Nasal obstruction 07/29/2022     Priority: Medium     Added automatically from request for surgery [id number]       Obesity      Priority: Medium     Obstructive sleep apnea      Priority: Medium     Encounter for  long-term (current) use of high-risk medication 01/27/2019     Priority: Medium     Deviated nasal septum 05/19/2016     Priority: Medium     Sleep apnea, obstructive 05/18/2016     Priority: Medium     Morbid obesity due to excess calories (H) 05/16/2016     Priority: Medium     LANDRY (obstructive sleep apnea) 05/06/2016     Priority: Medium     Severe LANDRY with AHI of 64.7/hr, prescribed CPAP       Major depressive disorder, recurrent episode, moderate (HCC) 01/21/2016     Priority: Medium     Morbid obesity, unspecified obesity type (H) 01/21/2016     Priority: Medium     Chronic fatigue 01/21/2016     Priority: Medium     Menorrhagia with regular cycle 01/21/2016     Priority: Medium     Allergic rhinitis, unspecified allergic rhinitis type 01/21/2016     Priority: Medium     Family history of diabetes mellitus 05/14/2015     Priority: Medium     Dysmenorrhea 05/01/2014     Priority: Medium     Vitamin B12 deficiency without anemia 06/11/2013     Priority: Medium     Diagnosis updated by automated process. Provider to review and confirm.       Vitamin D deficiency 06/11/2013     Priority: Medium     Ascorbic acid deficiency 06/11/2013     Priority: Medium     Diagnosis updated by automated process. Provider to review and confirm.       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     Priority: Medium     Fatigue 05/13/2013     Priority: Medium     Heavy menses 05/13/2013     Priority: Medium     Memory difficulty 05/13/2013     Priority: Medium     Allergic rhinosinusitis 05/13/2013     Priority: Medium     CARDIOVASCULAR SCREENING; LDL GOAL LESS THAN 160 05/13/2013     Priority: Medium     Acne 05/13/2013     Priority: Medium     Acute maxillary sinusitis 05/13/2013     Priority: Medium      Past Medical History:   Diagnosis Date     Abnormal Pap smear     Don't  remember when it was and follow up clear     Abnormal Pap smear of cervix 2019     Allergic rhinitis, cause unspecified      Depressive disorder 2008     Migraines 1998     Obesity      Obstructive sleep apnea      Other acne      Polycystic ovary syndrome 2013     Sleep apnea     No treatment at this time.     Uncomplicated asthma     Borderline - exercise induced     Unspecified urinary incontinence      Varicella 1985     Past Surgical History:   Procedure Laterality Date     BIOPSY  2011    Lump in breast - diagnosis was fatty tumor     GENITOURINARY SURGERY  1986    Urethrotomy     SEPTOPLASTY, TURBINOPLASTY, COMBINED N/A 5/18/2016    Procedure: COMBINED SEPTOPLASTY, TURBINOPLASTY;  Surgeon: Baldev Perez MD;  Location:  OR     [facility] NONSPECIFIC PROCEDURE      Urology surgery for night time bed wetting at age 6.     Current Outpatient Medications   Medication Sig Dispense Refill     albuterol (PROAIR HFA/PROVENTIL HFA/VENTOLIN HFA) 108 (90 Base) MCG/ACT inhaler Inhale 2 puffs into the lungs every 6 hours 8.5 g 2     ammonium lactate (LAC-HYDRIN) 12 % external lotion Apply topically 2 times daily To upper arms 222 mL 1     cetirizine (ZYRTEC) 10 MG tablet Take 10 mg by mouth daily       medroxyPROGESTERone (PROVERA) 10 MG tablet Take 1 tablet every day for 10 days every 3-4 months for withdrawal bleed 10 tablet 3     montelukast (SINGULAIR) 10 MG tablet Take 1 tablet (10 mg) by mouth At Bedtime 90 tablet 3     sertraline (ZOLOFT) 100 MG tablet Take 1 tablet (100 mg) by mouth daily 90 tablet 0     traZODone (DESYREL) 50 MG tablet Take 1 tablet (50 mg) by mouth At Bedtime 30 tablet 3     triamcinolone (KENALOG) 0.1 % external ointment Apply topically 2 times daily To areas on both pointer fingers 30 g 0       Allergies   Allergen Reactions     Animal Dander      Cats      Dogs      Rabbit Protein      Seasonal Allergies         Social History     Tobacco Use     Smoking status: Never     Smokeless tobacco:  "Never   Substance Use Topics     Alcohol use: Yes     Comment: 6 drinks weekly       History   Drug Use No         Objective     /88 (BP Location: Right arm, Patient Position: Sitting, Cuff Size: Adult Large)   Pulse 81   Resp 16   Ht 1.651 m (5' 5\")   Wt 118.8 kg (262 lb)   LMP 07/01/2022 (Approximate)   SpO2 98%   Breastfeeding No   BMI 43.60 kg/m      Physical Exam    GENERAL APPEARANCE: healthy, alert and no distress     EYES: EOMI, PERRL     HENT: ear canals and TM's normal and nose and mouth without ulcers or lesions     NECK: no adenopathy, no asymmetry, masses, or scars and thyroid normal to palpation     RESP: lungs clear to auscultation - no rales, rhonchi or wheezes     CV: regular rates and rhythm, normal S1 S2, no S3 or S4 and no murmur, click or rub     ABDOMEN:  soft, nontender, no HSM or masses and bowel sounds normal     MS: extremities normal- no gross deformities noted, no evidence of inflammation in joints, FROM in all extremities.     SKIN: no suspicious lesions or rashes     NEURO: Normal strength and tone, sensory exam grossly normal, mentation intact and speech normal     PSYCH: mentation appears normal. and affect normal/bright     LYMPHATICS: No cervical adenopathy    Recent Labs   Lab Test 04/08/21  0732   HGB 12.1      INR 0.97      POTASSIUM 3.9   CR 0.80        Diagnostics:  Recent Results (from the past 168 hour(s))   Hemoglobin A1c    Collection Time: 12/07/22  4:42 PM   Result Value Ref Range    Hemoglobin A1C 5.9 (H) <5.7 %   Hemoglobin    Collection Time: 12/07/22  4:42 PM   Result Value Ref Range    Hemoglobin 13.7 11.7 - 15.7 g/dL   Comprehensive metabolic panel    Collection Time: 12/07/22  4:42 PM   Result Value Ref Range    Sodium 141 136 - 145 mmol/L    Potassium 4.2 3.4 - 5.3 mmol/L    Chloride 104 98 - 107 mmol/L    Carbon Dioxide (CO2) 25 22 - 29 mmol/L    Anion Gap 12 7 - 15 mmol/L    Urea Nitrogen 13.1 6.0 - 20.0 mg/dL    Creatinine 0.80 0.51 - " 0.95 mg/dL    Calcium 9.6 8.6 - 10.0 mg/dL    Glucose 110 (H) 70 - 99 mg/dL    Alkaline Phosphatase 100 35 - 104 U/L     (H) 10 - 35 U/L     (H) 10 - 35 U/L    Protein Total 8.3 6.4 - 8.3 g/dL    Albumin 4.1 3.5 - 5.2 g/dL    Bilirubin Total 0.5 <=1.2 mg/dL    GFR Estimate >90 >60 mL/min/1.73m2      No EKG required, no history of coronary heart disease, significant arrhythmia, peripheral arterial disease or other structural heart disease.    Revised Cardiac Risk Index (RCRI):  The patient has the following serious cardiovascular risks for perioperative complications:   - No serious cardiac risks = 0 points     RCRI Interpretation: 0 points: Class I (very low risk - 0.4% complication rate)           Signed Electronically by: LM Farr CNP  Copy of this evaluation report is provided to requesting physician.

## 2022-12-29 ENCOUNTER — ANESTHESIA EVENT (OUTPATIENT)
Dept: SURGERY | Facility: AMBULATORY SURGERY CENTER | Age: 42
End: 2022-12-29
Payer: COMMERCIAL

## 2023-01-02 ENCOUNTER — HOSPITAL ENCOUNTER (OUTPATIENT)
Facility: AMBULATORY SURGERY CENTER | Age: 43
Discharge: HOME | End: 2023-01-02
Attending: OTOLARYNGOLOGY
Payer: COMMERCIAL

## 2023-01-02 ENCOUNTER — ANESTHESIA (OUTPATIENT)
Dept: SURGERY | Facility: AMBULATORY SURGERY CENTER | Age: 43
End: 2023-01-02
Payer: COMMERCIAL

## 2023-01-02 VITALS
BODY MASS INDEX: 43.32 KG/M2 | DIASTOLIC BLOOD PRESSURE: 95 MMHG | WEIGHT: 260 LBS | SYSTOLIC BLOOD PRESSURE: 138 MMHG | RESPIRATION RATE: 15 BRPM | TEMPERATURE: 97.4 F | HEART RATE: 78 BPM | HEIGHT: 65 IN | OXYGEN SATURATION: 94 %

## 2023-01-02 DIAGNOSIS — J34.89 NASAL OBSTRUCTION: ICD-10-CM

## 2023-01-02 LAB
HCG UR QL: NEGATIVE
INTERNAL QC OK POCT: NORMAL
POCT KIT EXPIRATION DATE: NORMAL
POCT KIT LOT NUMBER: NORMAL

## 2023-01-02 PROCEDURE — 30802 ABLATE INF TURBINATE SUBMUC: CPT | Mod: 50

## 2023-01-02 PROCEDURE — 30520 REPAIR OF NASAL SEPTUM: CPT

## 2023-01-02 PROCEDURE — 81025 URINE PREGNANCY TEST: CPT | Performed by: PATHOLOGY

## 2023-01-02 PROCEDURE — 30520 REPAIR OF NASAL SEPTUM: CPT | Mod: GC

## 2023-01-02 RX ORDER — MEPERIDINE HYDROCHLORIDE 25 MG/ML
12.5 INJECTION INTRAMUSCULAR; INTRAVENOUS; SUBCUTANEOUS
Status: DISCONTINUED | OUTPATIENT
Start: 2023-01-02 | End: 2023-01-03 | Stop reason: HOSPADM

## 2023-01-02 RX ORDER — OXYCODONE HYDROCHLORIDE 5 MG/1
5-10 TABLET ORAL EVERY 4 HOURS PRN
Qty: 16 TABLET | Refills: 0 | Status: SHIPPED | OUTPATIENT
Start: 2023-01-02 | End: 2023-07-07

## 2023-01-02 RX ORDER — SODIUM CHLORIDE, SODIUM LACTATE, POTASSIUM CHLORIDE, CALCIUM CHLORIDE 600; 310; 30; 20 MG/100ML; MG/100ML; MG/100ML; MG/100ML
INJECTION, SOLUTION INTRAVENOUS CONTINUOUS
Status: DISCONTINUED | OUTPATIENT
Start: 2023-01-02 | End: 2023-01-02 | Stop reason: HOSPADM

## 2023-01-02 RX ORDER — OXYCODONE HYDROCHLORIDE 5 MG/1
10 TABLET ORAL
Status: DISCONTINUED | OUTPATIENT
Start: 2023-01-02 | End: 2023-01-03 | Stop reason: HOSPADM

## 2023-01-02 RX ORDER — ESMOLOL HYDROCHLORIDE 10 MG/ML
INJECTION INTRAVENOUS PRN
Status: DISCONTINUED | OUTPATIENT
Start: 2023-01-02 | End: 2023-01-02

## 2023-01-02 RX ORDER — HYDROMORPHONE HYDROCHLORIDE 1 MG/ML
0.4 INJECTION, SOLUTION INTRAMUSCULAR; INTRAVENOUS; SUBCUTANEOUS EVERY 5 MIN PRN
Status: DISCONTINUED | OUTPATIENT
Start: 2023-01-02 | End: 2023-01-02 | Stop reason: HOSPADM

## 2023-01-02 RX ORDER — FENTANYL CITRATE 50 UG/ML
INJECTION, SOLUTION INTRAMUSCULAR; INTRAVENOUS PRN
Status: DISCONTINUED | OUTPATIENT
Start: 2023-01-02 | End: 2023-01-02

## 2023-01-02 RX ORDER — GABAPENTIN 300 MG/1
300 CAPSULE ORAL
Status: COMPLETED | OUTPATIENT
Start: 2023-01-02 | End: 2023-01-02

## 2023-01-02 RX ORDER — DEXAMETHASONE SODIUM PHOSPHATE 4 MG/ML
INJECTION, SOLUTION INTRA-ARTICULAR; INTRALESIONAL; INTRAMUSCULAR; INTRAVENOUS; SOFT TISSUE PRN
Status: DISCONTINUED | OUTPATIENT
Start: 2023-01-02 | End: 2023-01-02

## 2023-01-02 RX ORDER — ACETAMINOPHEN 325 MG/1
975 TABLET ORAL ONCE
Status: COMPLETED | OUTPATIENT
Start: 2023-01-02 | End: 2023-01-02

## 2023-01-02 RX ORDER — FENTANYL CITRATE 50 UG/ML
50 INJECTION, SOLUTION INTRAMUSCULAR; INTRAVENOUS EVERY 5 MIN PRN
Status: DISCONTINUED | OUTPATIENT
Start: 2023-01-02 | End: 2023-01-02 | Stop reason: HOSPADM

## 2023-01-02 RX ORDER — ONDANSETRON 2 MG/ML
4 INJECTION INTRAMUSCULAR; INTRAVENOUS EVERY 30 MIN PRN
Status: DISCONTINUED | OUTPATIENT
Start: 2023-01-02 | End: 2023-01-03 | Stop reason: HOSPADM

## 2023-01-02 RX ORDER — HYDROMORPHONE HYDROCHLORIDE 1 MG/ML
0.2 INJECTION, SOLUTION INTRAMUSCULAR; INTRAVENOUS; SUBCUTANEOUS EVERY 5 MIN PRN
Status: DISCONTINUED | OUTPATIENT
Start: 2023-01-02 | End: 2023-01-02 | Stop reason: HOSPADM

## 2023-01-02 RX ORDER — LIDOCAINE 40 MG/G
CREAM TOPICAL
Status: DISCONTINUED | OUTPATIENT
Start: 2023-01-02 | End: 2023-01-02 | Stop reason: HOSPADM

## 2023-01-02 RX ORDER — ONDANSETRON 2 MG/ML
INJECTION INTRAMUSCULAR; INTRAVENOUS PRN
Status: DISCONTINUED | OUTPATIENT
Start: 2023-01-02 | End: 2023-01-02

## 2023-01-02 RX ORDER — OXYMETAZOLINE HYDROCHLORIDE 0.05 G/100ML
SPRAY NASAL PRN
Status: DISCONTINUED | OUTPATIENT
Start: 2023-01-02 | End: 2023-01-02 | Stop reason: HOSPADM

## 2023-01-02 RX ORDER — PROPOFOL 10 MG/ML
INJECTION, EMULSION INTRAVENOUS CONTINUOUS PRN
Status: DISCONTINUED | OUTPATIENT
Start: 2023-01-02 | End: 2023-01-02

## 2023-01-02 RX ORDER — OXYCODONE HYDROCHLORIDE 5 MG/1
5 TABLET ORAL EVERY 4 HOURS PRN
Status: DISCONTINUED | OUTPATIENT
Start: 2023-01-02 | End: 2023-01-03 | Stop reason: HOSPADM

## 2023-01-02 RX ORDER — ONDANSETRON 4 MG/1
4 TABLET, ORALLY DISINTEGRATING ORAL EVERY 30 MIN PRN
Status: DISCONTINUED | OUTPATIENT
Start: 2023-01-02 | End: 2023-01-03 | Stop reason: HOSPADM

## 2023-01-02 RX ORDER — CEPHALEXIN 500 MG/1
500 CAPSULE ORAL 4 TIMES DAILY
Qty: 28 CAPSULE | Refills: 3 | Status: SHIPPED | OUTPATIENT
Start: 2023-01-02 | End: 2023-07-07

## 2023-01-02 RX ORDER — PROPOFOL 10 MG/ML
INJECTION, EMULSION INTRAVENOUS PRN
Status: DISCONTINUED | OUTPATIENT
Start: 2023-01-02 | End: 2023-01-02

## 2023-01-02 RX ORDER — FENTANYL CITRATE 50 UG/ML
25 INJECTION, SOLUTION INTRAMUSCULAR; INTRAVENOUS EVERY 5 MIN PRN
Status: DISCONTINUED | OUTPATIENT
Start: 2023-01-02 | End: 2023-01-02 | Stop reason: HOSPADM

## 2023-01-02 RX ORDER — ECHINACEA PURPUREA EXTRACT 125 MG
2 TABLET ORAL DAILY PRN
Qty: 100 ML | Refills: 0 | Status: SHIPPED | OUTPATIENT
Start: 2023-01-02 | End: 2023-07-07

## 2023-01-02 RX ORDER — SODIUM CHLORIDE, SODIUM LACTATE, POTASSIUM CHLORIDE, CALCIUM CHLORIDE 600; 310; 30; 20 MG/100ML; MG/100ML; MG/100ML; MG/100ML
INJECTION, SOLUTION INTRAVENOUS CONTINUOUS
Status: DISCONTINUED | OUTPATIENT
Start: 2023-01-02 | End: 2023-01-03 | Stop reason: HOSPADM

## 2023-01-02 RX ORDER — LIDOCAINE HYDROCHLORIDE AND EPINEPHRINE 10; 10 MG/ML; UG/ML
INJECTION, SOLUTION INFILTRATION; PERINEURAL PRN
Status: DISCONTINUED | OUTPATIENT
Start: 2023-01-02 | End: 2023-01-02 | Stop reason: HOSPADM

## 2023-01-02 RX ORDER — OXYCODONE HYDROCHLORIDE 5 MG/1
10 TABLET ORAL EVERY 4 HOURS PRN
Status: DISCONTINUED | OUTPATIENT
Start: 2023-01-02 | End: 2023-01-03 | Stop reason: HOSPADM

## 2023-01-02 RX ORDER — FENTANYL CITRATE 50 UG/ML
25 INJECTION, SOLUTION INTRAMUSCULAR; INTRAVENOUS
Status: DISCONTINUED | OUTPATIENT
Start: 2023-01-02 | End: 2023-01-03 | Stop reason: HOSPADM

## 2023-01-02 RX ADMIN — DEXAMETHASONE SODIUM PHOSPHATE 10 MG: 4 INJECTION, SOLUTION INTRA-ARTICULAR; INTRALESIONAL; INTRAMUSCULAR; INTRAVENOUS; SOFT TISSUE at 12:10

## 2023-01-02 RX ADMIN — ACETAMINOPHEN 975 MG: 325 TABLET ORAL at 10:24

## 2023-01-02 RX ADMIN — Medication 100 MCG: at 12:34

## 2023-01-02 RX ADMIN — Medication 100 MCG: at 12:38

## 2023-01-02 RX ADMIN — SODIUM CHLORIDE, SODIUM LACTATE, POTASSIUM CHLORIDE, CALCIUM CHLORIDE: 600; 310; 30; 20 INJECTION, SOLUTION INTRAVENOUS at 10:30

## 2023-01-02 RX ADMIN — PROPOFOL 200 MCG/KG/MIN: 10 INJECTION, EMULSION INTRAVENOUS at 12:03

## 2023-01-02 RX ADMIN — ESMOLOL HYDROCHLORIDE 10 MG: 10 INJECTION INTRAVENOUS at 13:44

## 2023-01-02 RX ADMIN — FENTANYL CITRATE 25 MCG: 50 INJECTION, SOLUTION INTRAMUSCULAR; INTRAVENOUS at 13:36

## 2023-01-02 RX ADMIN — FENTANYL CITRATE 25 MCG: 50 INJECTION, SOLUTION INTRAMUSCULAR; INTRAVENOUS at 14:14

## 2023-01-02 RX ADMIN — Medication 100 MCG: at 12:28

## 2023-01-02 RX ADMIN — PROPOFOL 100 MCG/KG/MIN: 10 INJECTION, EMULSION INTRAVENOUS at 13:03

## 2023-01-02 RX ADMIN — Medication 50 MG: at 12:00

## 2023-01-02 RX ADMIN — Medication 100 MCG: at 12:27

## 2023-01-02 RX ADMIN — Medication 100 MCG: at 12:25

## 2023-01-02 RX ADMIN — PROPOFOL 150 MCG/KG/MIN: 10 INJECTION, EMULSION INTRAVENOUS at 12:27

## 2023-01-02 RX ADMIN — SODIUM CHLORIDE, SODIUM LACTATE, POTASSIUM CHLORIDE, CALCIUM CHLORIDE: 600; 310; 30; 20 INJECTION, SOLUTION INTRAVENOUS at 12:27

## 2023-01-02 RX ADMIN — PROPOFOL 200 MG: 10 INJECTION, EMULSION INTRAVENOUS at 12:00

## 2023-01-02 RX ADMIN — FENTANYL CITRATE 50 MCG: 50 INJECTION, SOLUTION INTRAMUSCULAR; INTRAVENOUS at 12:10

## 2023-01-02 RX ADMIN — Medication 100 MCG: at 12:46

## 2023-01-02 RX ADMIN — ONDANSETRON 4 MG: 2 INJECTION INTRAMUSCULAR; INTRAVENOUS at 12:10

## 2023-01-02 RX ADMIN — FENTANYL CITRATE 25 MCG: 50 INJECTION, SOLUTION INTRAMUSCULAR; INTRAVENOUS at 13:48

## 2023-01-02 RX ADMIN — GABAPENTIN 300 MG: 300 CAPSULE ORAL at 10:24

## 2023-01-02 RX ADMIN — FENTANYL CITRATE 50 MCG: 50 INJECTION, SOLUTION INTRAMUSCULAR; INTRAVENOUS at 12:00

## 2023-01-02 RX ADMIN — OXYCODONE HYDROCHLORIDE 5 MG: 5 TABLET ORAL at 14:14

## 2023-01-02 NOTE — ANESTHESIA POSTPROCEDURE EVALUATION
Patient: Steffi Hernandez    Procedure: Procedure(s):  SEPTOPLASTY, NOSE bilateral Turbinate Reduction, bilateral       Anesthesia Type:  General    Note:  Disposition: Outpatient   Postop Pain Control: Uneventful            Sign Out: Well controlled pain   PONV: No   Neuro/Psych: Uneventful            Sign Out: Acceptable/Baseline neuro status   Airway/Respiratory: Uneventful            Sign Out: Acceptable/Baseline resp. status   CV/Hemodynamics: Uneventful            Sign Out: Acceptable CV status   Other NRE: NONE   DID A NON-ROUTINE EVENT OCCUR? No           Last vitals:  Vitals Value Taken Time   /102 01/02/23 1425   Temp 36.4  C (97.5  F) 01/02/23 1425   Pulse 81 01/02/23 1425   Resp 12 01/02/23 1425   SpO2 99 % 01/02/23 1424   Vitals shown include unvalidated device data.    Electronically Signed By: Oleg Turner MD  January 2, 2023  3:05 PM

## 2023-01-02 NOTE — BRIEF OP NOTE
[facility]    Brief Operative Note    Pre-operative diagnosis: Nasal obstruction [J34.89]  Post-operative diagnosis Same as pre-operative diagnosis    Procedure: Procedure(s):  SEPTOPLASTY, NOSE bilateral Turbinate Reduction, bilateral  Surgeon: Surgeon(s) and Role:     * Krishan Cheung MD - Primary  Anesthesia: General   Estimated Blood Loss: 200 ml    Drains: None  Specimens: * No specimens in log *  Findings:   See op note  Complications: None.  Implants: * No implants in log *

## 2023-01-02 NOTE — INTERVAL H&P NOTE
"I have reviewed the surgical (or preoperative) H&P that is linked to this encounter, and examined the patient. There are no significant changes    Clinical Conditions Present on Arrival:  Clinically Significant Risk Factors Present on Admission                    # Severe Obesity: Estimated body mass index is 43.27 kg/m  as calculated from the following:    Height as of this encounter: 1.651 m (5' 5\").    Weight as of this encounter: 117.9 kg (260 lb).       "

## 2023-01-02 NOTE — ANESTHESIA CARE TRANSFER NOTE
Patient: Steffi Hernandez    Procedure: Procedure(s):  SEPTOPLASTY, NOSE bilateral Turbinate Reduction, bilateral       Diagnosis: Nasal obstruction [J34.89]  Diagnosis Additional Information: No value filed.    Anesthesia Type:   General     Note:    Oropharynx: oropharynx clear of all foreign objects and spontaneously breathing  Level of Consciousness: awake  Oxygen Supplementation: face mask  Level of Supplemental Oxygen (L/min / FiO2): 6  Independent Airway: airway patency satisfactory and stable  Dentition: dentition unchanged  Vital Signs Stable: post-procedure vital signs reviewed and stable  Report to RN Given: handoff report given  Patient transferred to: PACU    Handoff Report: Identifed the Patient, Identified the Reponsible Provider, Reviewed the pertinent medical history, Discussed the surgical course, Reviewed Intra-OP anesthesia mangement and issues during anesthesia, Set expectations for post-procedure period and Allowed opportunity for questions and acknowledgement of understanding      Vitals:  Vitals Value Taken Time   BP     Temp     Pulse     Resp     SpO2         Electronically Signed By: LM Barron CRNA  January 2, 2023  2:07 PM

## 2023-01-02 NOTE — ANESTHESIA PREPROCEDURE EVALUATION
Anesthesia Pre-Procedure Evaluation    Patient: Steffi Hernandez   MRN: [id number] : 1980        Procedure : Procedure(s):  SEPTOPLASTY, NOSE bilateral Turbinate Reduction, bilateral          Past Medical History:   Diagnosis Date     Abnormal Pap smear     Don't remember when it was and follow up clear     Abnormal Pap smear of cervix      Allergic rhinitis, cause unspecified      Depressive disorder      Migraines      Obesity      Obstructive sleep apnea      Other acne      Polycystic ovary syndrome      Sleep apnea     No treatment at this time.     Uncomplicated asthma     Borderline - exercise induced     Unspecified urinary incontinence      Varicella       Past Surgical History:   Procedure Laterality Date     BIOPSY      Lump in breast - diagnosis was fatty tumor     GENITOURINARY SURGERY      Urethrotomy     SEPTOPLASTY, TURBINOPLASTY, COMBINED N/A 2016    Procedure: COMBINED SEPTOPLASTY, TURBINOPLASTY;  Surgeon: Baldev Perez MD;  Location:  OR     [facility] NONSPECIFIC PROCEDURE      Urology surgery for night time bed wetting at age 6.      Allergies   Allergen Reactions     Animal Dander      Cats      Dogs      Rabbit Protein      Seasonal Allergies       Social History     Tobacco Use     Smoking status: Never     Smokeless tobacco: Never   Substance Use Topics     Alcohol use: Yes     Comment: 6 drinks weekly      Wt Readings from Last 1 Encounters:   23 117.9 kg (260 lb)        Anesthesia Evaluation            ROS/MED HX  ENT/Pulmonary:     (+) sleep apnea, asthma     Neurologic:     (+) migraines,     Cardiovascular:       METS/Exercise Tolerance:     Hematologic:       Musculoskeletal:       GI/Hepatic:       Renal/Genitourinary:       Endo:     (+) Obesity,     Psychiatric/Substance Use:     (+) psychiatric history depression     Infectious Disease:       Malignancy:       Other:            Physical Exam    Airway  airway exam normal      Mallampati:  III   TM distance: > 3 FB   Neck ROM: full   Mouth opening: > 3 cm    Respiratory Devices and Support         Dental  no notable dental history         Cardiovascular          Rhythm and rate: regular and normal     Pulmonary   pulmonary exam normal        breath sounds clear to auscultation           OUTSIDE LABS:  CBC:   Lab Results   Component Value Date    WBC 7.2 04/08/2021    WBC 7.5 02/15/2019    HGB 13.7 12/07/2022    HGB 12.1 04/08/2021    HCT 37.5 04/08/2021    HCT 43.8 02/15/2019     04/08/2021     02/15/2019     BMP:   Lab Results   Component Value Date     12/07/2022     04/08/2021    POTASSIUM 4.2 12/07/2022    POTASSIUM 3.9 04/08/2021    CHLORIDE 104 12/07/2022    CHLORIDE 107 04/08/2021    CO2 25 12/07/2022    CO2 26 04/08/2021    BUN 13.1 12/07/2022    BUN 14 04/08/2021    CR 0.80 12/07/2022    CR 0.80 04/08/2021     (H) 12/07/2022     (H) 04/08/2021     COAGS:   Lab Results   Component Value Date    INR 0.97 04/08/2021     POC:   Lab Results   Component Value Date    BGM 91 05/19/2016    HCG Negative 01/02/2023     HEPATIC:   Lab Results   Component Value Date    ALBUMIN 4.1 12/07/2022    PROTTOTAL 8.3 12/07/2022     (H) 12/07/2022     (H) 12/07/2022    GGT 23 11/13/2018    ALKPHOS 100 12/07/2022    BILITOTAL 0.5 12/07/2022     OTHER:   Lab Results   Component Value Date    A1C 5.9 (H) 12/07/2022    KIRT 9.6 12/07/2022    MAG 2.0 01/21/2016    LIPASE 88 09/18/2013    TSH 1.63 01/21/2016    T4 1.09 01/21/2016       Anesthesia Plan    ASA Status:  3   NPO Status:  NPO Appropriate    Anesthesia Type: General.     - Airway: ETT   Induction: Intravenous.   Maintenance: Balanced.        Consents    Anesthesia Plan(s) and associated risks, benefits, and realistic alternatives discussed. Questions answered and patient/representative(s) expressed understanding.    - Discussed:     - Discussed with:  Patient      - Extended Intubation/Ventilatory Support  Discussed: No.      - Patient is DNR/DNI Status: No    Use of blood products discussed: No .     Postoperative Care    Pain management: IV analgesics, Multi-modal analgesia, Oral pain medications.   PONV prophylaxis: Ondansetron (or other 5HT-3), Background Propofol Infusion     Comments:                Oleg Turner MD

## 2023-01-02 NOTE — OP NOTE
Otolaryngology Operative Report    Procedure Date: January 2, 2023      SURGEON: Krishan Cheung MD  ASSISTANT:  Galileo Espana MD; Lonnie Abbasi MD     PREOPERATIVE DIAGNOSIS:  Septal deviation, bilateral inferior turbinate hypertrophy, obstructed nasal breathing  POSTOPERATIVE DIAGNOSIS:  Same     PROCEDURE:    1.  Septoplasty  2.  Inferior turbinate reduction    FINDINGS:   1. Rightward cartilaginous and bony septal deviation  2. Bilateral inferior turbinate hypertrophy     ANESTHESIA:  General.  ESTIMATED BLOOD LOSS:  200mL  SPECIMEN:  N/A.  COMPLICATIONS:  None     INDICATIONS FOR PROCEDURE: Steffi Hernandez is a 42 year old female with a past medical history of septal deviation, turbinate hypertrophy, and nasal obstruction. This obstruction failed to resolve with topical nasal steroids. She elected to proceed with the above stated procedures. After a full discussion of risks and benefits of the procedure, informed consent was obtained.     OPERATIVE COURSE:  The patient was brought to the operating room and placed on the operating table supine.  General endotracheal anesthesia was induced.  The patient was prepped and draped in standard fashion.  A timeout was performed to identify the patient and correctness of the procedure. 7cc of 1% lidocaine with 1:100,000 epinephrine was infiltrated into the nasal septum and inferior turbinates. A [place] incision was made on the left anterior septal mucosa. A plane was elevated in the sub-mucoperichondrial level using a [place] elevator. The cartilaginous septum was incised with a 15 blade to create 1.5cm caudal and dorsal struts. The quadrangular cartilage was removed. Using a double action scissors the bony septum was incised inferiorly and superiorly. This portion was removed with a pituitary forceps. The septum was deemed to be appropriately straight at this point. There was a left mucosal rent noted as well as a small right rent just over the maxillary crest.      Attention was then turned to the turbinate reduction portion of the case. The turbinates were incised at the anterior portions. A submucosal plane was dissected with a [place] elevator. A turbinate shaver blade was used to reduce the turbinate tissue submucosally along the medial and inferior aspects of the turbinate. They were then out fractured with a [place] elevator.    At this point the septum was quilted with a plain gut suture. The [place] incision was closed with a chromic suture (3-0). [place] stents were placed. They were stitched in place with a 3-0 Nylon suture using a single mattress stitch across the anterior septum. This marked the end of the procedure. She tolerated it well without any intraoperative procedure.     Dr. Cheung was present for the entire case    Galileo Espana MD  Otolaryngology - Head and Neck Surgery

## 2023-01-02 NOTE — DISCHARGE INSTRUCTIONS
[facility]  Home Care Following Anesthesia  For 24 hours after surgery:  Get plenty of rest.  A responsible adult must stay with you for at least 24 hours after you leave the surgery center.  Do not drive or use heavy equipment.  If you have weakness or tingling, don't drive or use heavy equipment until this feeling goes away.   Do not drink alcohol.   Avoid strenuous or risky activities.  Ask for help when climbing stairs.  You may feel lightheaded.  IF so, sit for a few minutes before standing.  Have someone help you get up.   If you have nausea (feel sick to your stomach): Drink only clear liquids such as apple juice, ginger ale, broth or 7-Up.  Rest may also help.  Be sure to drink enough fluids.  Move to a regular diet as you feel able.   You may have a slight fever.  Call the doctor if your fever is over 100 F (37.7 C) (taken under the tongue) or lasts longer than 24 hours.  You may have a dry mouth, a sore throat, muscle aches or trouble sleeping. These should go away after 24 hours.  Do not make important or legal decisions.   It is recommended to avoid smoking.               Tips for taking pain medications  To get the best pain relief possible, remember these points:  Take pain medications as directed, before pain becomes severe.  Pain medication can upset your stomach: taking it with food may help.  Constipation is a common side effect of pain medication. Drink plenty of  fluids.  Eat foods high in fiber. Take a stool softener if recommended by your doctor or pharmacist.  Do not drink alcohol, drive or operate machinery while taking pain medications.  Ask about other ways to control pain, such as with heat, ice or relaxation.    Tylenol/Acetaminophen Consumption  To help encourage the safe use of acetaminophen, the makers of TYLENOL  have lowered the maximum daily dose for single-ingredient Extra Strength TYLENOL  (acetaminophen) products sold in the [place] from 8 pills  per day (4,000 mg) to 6 pills per day (3,000 mg). The dosing interval has also changed from 2 pills every 4-6 hours to 2 pills every 6 hours.  If you feel your pain relief is insufficient, you may take Tylenol/Acetaminophen in addition to your narcotic pain medication.   Be careful not to exceed 3,000 mg of Tylenol/Acetaminophen in a 24 hour period from all sources.  If you are taking extra strength Tylenol/acetaminophen (500 mg), the maximum dose is 6 tablets in 24 hours.  If you are taking regular strength acetaminophen (325 mg), the maximum dose is 9 tablets in 24 hours.    Call a doctor for any of the following:  Signs of infection (fever, growing tenderness at the surgery site, a large amount of drainage or bleeding, severe pain, foul-smelling drainage, redness, swelling).  It has been over 8 to 10 hours since surgery and you are still not able to urinate (pass water).  Headache for over 24 hours.  Numbness, tingling or weakness the day after surgery (if you had spinal anesthesia).  Signs of Covid-19 infection (temperature over 100 degrees, shortness of breath, cough, loss of taste/smell, generalized body aches, persistent headache, chills, sore throat, nausea/vomiting/diarrhea)  Your doctor is:  Dr. Krishan Cheung, ENT Otolaryngology: [phone]                    Or dial [phone] and ask for the resident on call for:  ENT Otolaryngology  For emergency care, call the:  [place] [facility] Department:  [phone] (TTY for hearing impaired: [phone])                   [facility]  Post-Operative Instructions Following NASAL SURGERY    Call promptly if:  Your temperature is over 100 F  You experience an unusual amount of bleeding  Re-injury of the operative site    What to Expect After Your Surgery:  Nasal stuffiness is the most annoying problem you will encounter. It is most distressful the first week after surgery. Often you will find that the pain medication will  not alleviate the pressure. Significant improvement can be expected when the nasal dressings are removed one week after surgery, and further gradual improvement can be expected several weeks thereafter.  Mild nasal or facial pain is expected after surgery and is well managed with oral pain medication except in the most unusual circumstances.  Some bloody discharge is expected after surgery. During the first 24 hours after surgery, you may need to change the nasal absorbent dressing 10-20 times. This is typical. The nurses at the surgery center will instruct you on how to change these absorbent dressings and this can be easily accomplished at home. Some oozing is expected because we avoid packing the nose, which causes considerable discomfort.  Some bruising after surgery is to be expected. The amount of bruising that occurs varies significantly from one individual to the next. Most swelling occurs around the eyes and reaches a peak the second day following surgery and then steadily improves.  Numbness in the tip of the nose, upper front teeth, or roof of the mouth following surgery is to be expected because the intranasal surgery has caused a temporary disruption of some of the nerves in this area.    Taking Care of Your Nose: It is important to take care of your nose after surgery and following these instructions to help optimize your recovery period.    Nasal Saline  Saline mist spray is an over the counter medication  Spray each nostril 4-5 times a day or as needed.  This will make splint removal a week after surgery much easier  Dermal Wound Cleanser  If your incision or nostrils have crusted/dry blood, use a Q-tip and roll over the area two times a day to help remove the dried blood  If incisions are clean, then a moist Q-tip can be used  Moisturizer  After cleaning the external incision, use aquaphor to nostrils          Some decrease in the sense of smell and alterations of taste is typical after surgery as  the nerve fibers that are responsible for your sense of smell are present high in the nose and the nasal congestion blocks the flow of air to this area. This will improve within the first one to two weeks following surgery.  Swelling from the surgery will give some distorted appearance to the nose and should not be a cause for alarm. The nose will appear excessively broad and the tip turned up more than is desirable. This is due to the swelling and will improve in the days following surgery.  A special ice pack will be given to you by the nurse before going home and is very helpful if used as much as possible int he first 24 hours after surgery to minimize the swelling. Thereafter, its use is not essential.  Blowing the nose is prohibited after surgery because it can stimulate bleeding and compromise your final result. If you feel the urge to sneeze, sneeze with your mouth open, as this will minimize any disturbance of the nasal tissues  If you wear glasses, the nurse will need to instruct you on the use of alternate methods for suspension of the glasses. They must NOT rest on the nose for any more than a brief period of time within the first 3-4 weeks following surgery. It is also important to note that your glasses may need to be refitted because of alterations in the shape of the nose may change the resting place for the glasses.  The nose must be kept dry or the external splint may be disrupted, compromising your final result. Bathing is permitted but soaking the nasal area and dressing is to be avoided.  Typically a light tape and plastic external nasal dressing is present after surgery along with surgical rubber splints, which have been placed inside the nose. Both the internal and external dressings are removed one week following surgery and arrangements for an office visit will be made prior to your leaving the surgical area.  Airline travel should be limited in the immediate week following surgery so as to  avoid any possibility of sinus blockage.    Exercise Restrictions:  Refrain from rigorous exercise for the first 2 weeks after surgery. Do not lift anything greater than 10 pounds. Avoid activities that would cause you to hold your breath, which would increase pressure.  After 2 weeks from surgery you can slowly and gradually increase your activity.

## 2023-01-09 ENCOUNTER — OFFICE VISIT (OUTPATIENT)
Dept: OTOLARYNGOLOGY | Facility: CLINIC | Age: 43
End: 2023-01-09
Payer: COMMERCIAL

## 2023-01-09 VITALS
OXYGEN SATURATION: 97 % | TEMPERATURE: 97.9 F | SYSTOLIC BLOOD PRESSURE: 126 MMHG | BODY MASS INDEX: 44.43 KG/M2 | RESPIRATION RATE: 20 BRPM | HEART RATE: 100 BPM | DIASTOLIC BLOOD PRESSURE: 84 MMHG | WEIGHT: 267 LBS

## 2023-01-09 DIAGNOSIS — Z98.890 POSTOPERATIVE STATE: Primary | ICD-10-CM

## 2023-01-09 PROCEDURE — 99024 POSTOP FOLLOW-UP VISIT: CPT | Performed by: OTOLARYNGOLOGY

## 2023-01-09 ASSESSMENT — PAIN SCALES - GENERAL: PAINLEVEL: NO PAIN (0)

## 2023-01-09 NOTE — LETTER
1/9/2023       RE: Steffi Hernandez  [address] [facility] [address]     Dear Colleague,    Thank you for referring your patient, Steffi Hernandez, to the [facility] [place] at [facility]. Please see a copy of my visit note below.    HISTORY OF PRESENT ILLNESS:  Steffi is seen postop septoplasty and turbinate reduction.    PHYSICAL EXAMINATION:  Medrano splints are removed.  Intranasal exam looks appropriate.    PLAN:  Followup will be in one month.            Again, thank you for allowing me to participate in the care of your patient.      Sincerely,    Krishan Cheung MD

## 2023-01-09 NOTE — PROGRESS NOTES
HISTORY OF PRESENT ILLNESS:  Steffi is seen postop septoplasty and turbinate reduction.    PHYSICAL EXAMINATION:  Medrano splints are removed.  Intranasal exam looks appropriate.    PLAN:  Followup will be in one month.

## 2023-01-09 NOTE — PATIENT INSTRUCTIONS
You were seen in the ENT Clinic today by Dr. Cheung. If you have any questions or concerns after your appointment, please contact us (see below)      2.   Please return to clinic in one month.        How to Contact Us:  Send a CoCollage message to your provider. Our team will respond to you via CoCollage. Occasionally, we will need to call you to get further information.  For urgent matters (Monday-Friday), call the ENT Clinic: [phone] and speak with a call center team member - they will route your call appropriately.   If you'd like to speak directly with a nurse, please find our contact information below. We do our best to check voicemail frequently throughout the day, and will work to call you back within 1-2 days. For urgent matters, please use the general clinic phone numbers listed above.      Sangeeta RAJAN RN  ENT RN Care Coordinator  Direct: [phone]  Sonia FOREMAN LPN  Direct: [phone]

## 2023-02-07 DIAGNOSIS — F33.1 MAJOR DEPRESSIVE DISORDER, RECURRENT EPISODE, MODERATE (H): ICD-10-CM

## 2023-02-10 RX ORDER — SERTRALINE HYDROCHLORIDE 100 MG/1
100 TABLET, FILM COATED ORAL DAILY
Qty: 90 TABLET | Refills: 0 | Status: SHIPPED | OUTPATIENT
Start: 2023-02-10 | End: 2023-06-09

## 2023-02-10 NOTE — TELEPHONE ENCOUNTER
SERTRALINE 100MG TABLETS      Last Written Prescription Date:  10/20/22  Last Fill Quantity: 90,   # refills: 0  Last Office Visit : 12/7/22  Future Office visit:  none    Routing refill request to provider for review/approval because:  Overdue for PHQ    90d damaris refill sent, routed to RN for follow up        Warnings Override History for sertraline (ZOLOFT) 100 MG tablet [[id number]]    Overridden by Krishan Cheung MD on Jan 2, 2023 11:55 AM   Drug-Drug   1. SEROTONERGIC OPIOIDS (HIGH RISK) / SELECTIVE SEROTONIN REUPTAKE INHIBITORS [Level: Major] [Reason: Benefit outweighs risk]   Other Orders: meperidine (DEMEROL) injection 12.5 mg   [Status: Signed and Held] [Phase of care: PACU/Phase II]      2. SELECTIVE SEROTONIN REUPTAKE INHIBITORS / SEROTONERGIC NON-OPIOID CNS DEPRESSANTS [Level: Major] [Reason: Benefit outweighs risk]   Other Orders: traZODone (DESYREL) 50 MG tablet      3. SEROTONERGIC OPIOIDS (HIGH RISK) / SELECTIVE SEROTONIN REUPTAKE INHIBITORS [Level: Major] [Reason: Benefit outweighs risk]   Other Orders: fentaNYL (PF) (SUBLIMAZE) injection 25 mcg   [Status: Signed and Held] [Phase of care: PACU]      4. SEROTONERGIC OPIOIDS (HIGH RISK) / SELECTIVE SEROTONIN REUPTAKE INHIBITORS [Level: Major] [Reason: Benefit outweighs risk]   Other Orders: fentaNYL (PF) (SUBLIMAZE) injection 50 mcg   [Status: Signed and Held] [Phase of care: PACU]      5. SEROTONERGIC OPIOIDS (HIGH RISK) / SELECTIVE SEROTONIN REUPTAKE INHIBITORS [Level: Major] [Reason: Benefit outweighs risk]   Other Orders: fentaNYL (PF) (SUBLIMAZE) injection 25 mcg   [Status: Signed and Held] [Phase of care: Phase ll]         Overridden by Corina Long APRN CNP on Dec 7, 2022 4:20 PM   Drug-Drug   1. SELECTIVE SEROTONIN REUPTAKE INHIBITORS / SEROTONERGIC NON-OPIOID CNS DEPRESSANTS [Level: Major] [Reason: Will monitor drug levels/drug effects ]   Other Orders: traZODone (DESYREL) 50 MG tablet

## 2023-02-13 ENCOUNTER — OFFICE VISIT (OUTPATIENT)
Dept: OTOLARYNGOLOGY | Facility: CLINIC | Age: 43
End: 2023-02-13
Payer: COMMERCIAL

## 2023-02-13 VITALS
DIASTOLIC BLOOD PRESSURE: 93 MMHG | TEMPERATURE: 98.6 F | HEIGHT: 65 IN | WEIGHT: 267 LBS | OXYGEN SATURATION: 100 % | BODY MASS INDEX: 44.48 KG/M2 | SYSTOLIC BLOOD PRESSURE: 139 MMHG | HEART RATE: 93 BPM

## 2023-02-13 DIAGNOSIS — Z98.890 POSTOPERATIVE STATE: Primary | ICD-10-CM

## 2023-02-13 PROCEDURE — 99024 POSTOP FOLLOW-UP VISIT: CPT | Performed by: OTOLARYNGOLOGY

## 2023-02-13 ASSESSMENT — PAIN SCALES - GENERAL: PAINLEVEL: NO PAIN (0)

## 2023-02-13 NOTE — LETTER
2/13/2023       RE: Steffi Hernandez  [address] [facility] [address]     Dear Colleague,    Thank you for referring your patient, Steffi Hernandez, to the [facility] E[facility] NOSE AND THROAT CLINIC [place] at [facility]. Please see a copy of my visit note below.    HISTORY OF PRESENT ILLNESS: Steffi is back to see us again today.  She is status post septoplasty and turbinate reduction.     PHYSICAL EXAMINATION: Examination of the nose looks appropriate.  Her breathing is excellent.      ASSESSMENT AND PLAN: Followup will be as needed.          Again, thank you for allowing me to participate in the care of your patient.      Sincerely,    Krishan Cheung MD

## 2023-02-13 NOTE — PROGRESS NOTES
HISTORY OF PRESENT ILLNESS: Steffi is back to see us again today.  She is status post septoplasty and turbinate reduction.     PHYSICAL EXAMINATION: Examination of the nose looks appropriate.  Her breathing is excellent.      ASSESSMENT AND PLAN: Followup will be as needed.

## 2023-02-13 NOTE — PATIENT INSTRUCTIONS
You were seen in the ENT Clinic today by Dr. Cheung. If you have any questions or concerns after your appointment, please contact us (see below)      2.   Please return to clinic as needed.         How to Contact Us:  Send a Libox message to your provider. Our team will respond to you via Libox. Occasionally, we will need to call you to get further information.  For urgent matters (Monday-Friday), call the ENT Clinic: [phone] and speak with a call center team member - they will route your call appropriately.   If you'd like to speak directly with a nurse, please find our contact information below. We do our best to check voicemail frequently throughout the day, and will work to call you back within 1-2 days. For urgent matters, please use the general clinic phone numbers listed above.      Sangeeta RAJAN RN  ENT RN Care Coordinator  Direct: [phone]  Sonia FOREMAN LPN  Direct: [phone]

## 2023-04-05 ENCOUNTER — LAB (OUTPATIENT)
Dept: LAB | Facility: CLINIC | Age: 43
End: 2023-04-05
Payer: COMMERCIAL

## 2023-04-05 ENCOUNTER — OFFICE VISIT (OUTPATIENT)
Dept: INTERNAL MEDICINE | Facility: CLINIC | Age: 43
End: 2023-04-05
Payer: COMMERCIAL

## 2023-04-05 VITALS
BODY MASS INDEX: 45.94 KG/M2 | HEART RATE: 91 BPM | SYSTOLIC BLOOD PRESSURE: 126 MMHG | TEMPERATURE: 98.3 F | HEIGHT: 65 IN | OXYGEN SATURATION: 95 % | WEIGHT: 275.7 LBS | DIASTOLIC BLOOD PRESSURE: 87 MMHG

## 2023-04-05 DIAGNOSIS — E66.01 CLASS 3 SEVERE OBESITY DUE TO EXCESS CALORIES WITH BODY MASS INDEX (BMI) OF 45.0 TO 49.9 IN ADULT, UNSPECIFIED WHETHER SERIOUS COMORBIDITY PRESENT (H): ICD-10-CM

## 2023-04-05 DIAGNOSIS — F33.1 MAJOR DEPRESSIVE DISORDER, RECURRENT EPISODE, MODERATE (H): ICD-10-CM

## 2023-04-05 DIAGNOSIS — E66.01 CLASS 3 SEVERE OBESITY DUE TO EXCESS CALORIES WITH BODY MASS INDEX (BMI) OF 45.0 TO 49.9 IN ADULT, UNSPECIFIED WHETHER SERIOUS COMORBIDITY PRESENT (H): Primary | ICD-10-CM

## 2023-04-05 DIAGNOSIS — F33.8 SEASONAL AFFECTIVE DISORDER (H): ICD-10-CM

## 2023-04-05 DIAGNOSIS — E66.813 CLASS 3 SEVERE OBESITY DUE TO EXCESS CALORIES WITH BODY MASS INDEX (BMI) OF 45.0 TO 49.9 IN ADULT, UNSPECIFIED WHETHER SERIOUS COMORBIDITY PRESENT (H): ICD-10-CM

## 2023-04-05 DIAGNOSIS — E66.813 CLASS 3 SEVERE OBESITY DUE TO EXCESS CALORIES WITH BODY MASS INDEX (BMI) OF 45.0 TO 49.9 IN ADULT, UNSPECIFIED WHETHER SERIOUS COMORBIDITY PRESENT (H): Primary | ICD-10-CM

## 2023-04-05 LAB
ALBUMIN SERPL BCG-MCNC: 4.3 G/DL (ref 3.5–5.2)
ALP SERPL-CCNC: 92 U/L (ref 35–104)
ALT SERPL W P-5'-P-CCNC: 102 U/L (ref 10–35)
ANION GAP SERPL CALCULATED.3IONS-SCNC: 10 MMOL/L (ref 7–15)
AST SERPL W P-5'-P-CCNC: 119 U/L (ref 10–35)
BILIRUB SERPL-MCNC: 0.4 MG/DL
BUN SERPL-MCNC: 12.1 MG/DL (ref 6–20)
CALCIUM SERPL-MCNC: 9.7 MG/DL (ref 8.6–10)
CHLORIDE SERPL-SCNC: 101 MMOL/L (ref 98–107)
CREAT SERPL-MCNC: 0.73 MG/DL (ref 0.51–0.95)
DEPRECATED HCO3 PLAS-SCNC: 25 MMOL/L (ref 22–29)
GFR SERPL CREATININE-BSD FRML MDRD: >90 ML/MIN/1.73M2
GLUCOSE SERPL-MCNC: 96 MG/DL (ref 70–99)
POTASSIUM SERPL-SCNC: 4.2 MMOL/L (ref 3.4–5.3)
PROT SERPL-MCNC: 8.2 G/DL (ref 6.4–8.3)
SODIUM SERPL-SCNC: 136 MMOL/L (ref 136–145)

## 2023-04-05 PROCEDURE — 80053 COMPREHEN METABOLIC PANEL: CPT | Performed by: PATHOLOGY

## 2023-04-05 PROCEDURE — 36415 COLL VENOUS BLD VENIPUNCTURE: CPT | Performed by: PATHOLOGY

## 2023-04-05 PROCEDURE — 99214 OFFICE O/P EST MOD 30 MIN: CPT | Performed by: NURSE PRACTITIONER

## 2023-04-05 RX ORDER — BUPROPION HYDROCHLORIDE 150 MG/1
150 TABLET ORAL EVERY MORNING
Qty: 90 TABLET | Refills: 1 | Status: SHIPPED | OUTPATIENT
Start: 2023-04-05 | End: 2023-07-12

## 2023-04-05 ASSESSMENT — ANXIETY QUESTIONNAIRES
3. WORRYING TOO MUCH ABOUT DIFFERENT THINGS: NOT AT ALL
GAD7 TOTAL SCORE: 2
1. FEELING NERVOUS, ANXIOUS, OR ON EDGE: SEVERAL DAYS
5. BEING SO RESTLESS THAT IT IS HARD TO SIT STILL: NOT AT ALL
IF YOU CHECKED OFF ANY PROBLEMS ON THIS QUESTIONNAIRE, HOW DIFFICULT HAVE THESE PROBLEMS MADE IT FOR YOU TO DO YOUR WORK, TAKE CARE OF THINGS AT HOME, OR GET ALONG WITH OTHER PEOPLE: NOT DIFFICULT AT ALL
7. FEELING AFRAID AS IF SOMETHING AWFUL MIGHT HAPPEN: NOT AT ALL
2. NOT BEING ABLE TO STOP OR CONTROL WORRYING: NOT AT ALL
GAD7 TOTAL SCORE: 2
6. BECOMING EASILY ANNOYED OR IRRITABLE: SEVERAL DAYS

## 2023-04-05 ASSESSMENT — PATIENT HEALTH QUESTIONNAIRE - PHQ9
5. POOR APPETITE OR OVEREATING: NOT AT ALL
SUM OF ALL RESPONSES TO PHQ QUESTIONS 1-9: 8

## 2023-04-05 NOTE — PATIENT INSTRUCTIONS
Seasonal Affective Disorder--Light Therapy   - 10,000 Lux strength light has been been shown to be effective.   - Use 15-30 minutes a day. Try to use first thing in the morning.   - Set light at or just slightly above eye-level, 10-12 inches from your face.

## 2023-04-05 NOTE — NURSING NOTE
"Steffi Hernandez is a 42 year old female patient that presents today in clinic for the following:    Chief Complaint   Patient presents with     RECHECK     Discus mental health.  Discuss Nutritionist/dietitian referral.  Discuss weight loss medication. Phentermine.     Pain     Right wrist pain since fall 2 weeks ago.     The patient's allergies and medications were reviewed as noted. A set of vitals were recorded as noted without incident: BP (!) 152/95 (BP Location: Right arm, Patient Position: Sitting, Cuff Size: Adult Large)   Pulse 91   Temp 98.3  F (36.8  C) (Oral)   Ht 1.651 m (5' 5\")   Wt 125.1 kg (275 lb 11.2 oz)   SpO2 95%   BMI 45.88 kg/m  . The patient does not have any other questions for the provider.    Laura Franklin, EMT at 5:27 PM on 4/5/2023.  Primary care clinic: [phone]  "

## 2023-04-05 NOTE — PROGRESS NOTES
Assessment & Plan     Class 3 severe obesity due to excess calories with body mass index (BMI) of 45.0 to 49.9 in adult, unspecified whether serious comorbidity present (H)  Check CMP today; will start Ozempic. Reviewed potential side effects. Will start with 0.25 mg with slow titration to help improve med tolerance. Continue working on increasing physical activity and nutritious diet. Referral provided for Nutrition.   - semaglutide (OZEMPIC) 2 MG/3ML SOPN pen; Inject 0.25 mg Subcutaneous every 7 days for 28 days, THEN 0.5 mg every 7 days for 28 days, THEN 1 mg every 7 days for 28 days.  - Nutrition Referral; Future  - Comprehensive metabolic panel (BMP + Alb, Alk Phos, ALT, AST, Total. Bili, TP); Future    Major depressive disorder, recurrent episode, moderate (H)  Start Wellbutrin to help with mood, continue with Sertraline 100 mg daily. Continue working on self-care and good stress management.   - buPROPion (WELLBUTRIN XL) 150 MG 24 hr tablet; Take 1 tablet (150 mg) by mouth every morning    Seasonal affective disorder (H)  See DME documentation below.  - SAD Light, 10,000 Lux Order    R wrist pain--ice, rest, refer to Sports Med if pain worsens or persists    MDM: 2+ problem visit, prescription drug management    Return in about 3 months (around 7/5/2023).    LM Farr [facility] INTERNAL MEDICINE [place]    Hitesh Kapadia is a 42 year old, presenting for the following health issues:  RECHECK (Discus mental health./Discuss Nutritionist/dietitian referral./Discuss weight loss medication. Phentermine.) and Pain (Right wrist pain since fall 2 weeks ago.)    HPI       Enjoyed a recent cruise to [place] and [place].    Weight concerns--Trying to be better about meal planning and healthier eating, for example salad with chicken for supper, fruit/yogurt in the house for snacks, avoiding junk foods. Has 2 friends on phentermine, had started to help them lose the weight  "quicker, wondering if an option.   Recognize improving her physical health will help mental health.    Mood--lower mood over past few months. Continues on sertraline 100 mg daily. Mood worsens in the winter months. Fully remote, doesn't feel this is as good for mental health, hoping for some hybrid or some interaction with people. Considering something in student services/.    Generally prefers to be outside but has a [employer], goal to ride for 20-30 min over lunch hour. 2-3x.  Increased water intake and tyring to eat better.     R wrist pain--fell 2 weeks ago, landed on L knee and R wrist. Getting better slowly, not as tender as it was initially.    Review of Systems   Constitutional, HEENT, cardiovascular, pulmonary, gi and gu systems are negative, except as otherwise noted.      Objective    /87   Pulse 91   Temp 98.3  F (36.8  C) (Oral)   Ht 1.651 m (5' 5\")   Wt 125.1 kg (275 lb 11.2 oz)   SpO2 95%   BMI 45.88 kg/m    Body mass index is 45.88 kg/m .  Physical Exam   GENERAL: healthy, alert and no distress  RESP: lungs clear to auscultation - no rales, rhonchi or wheezes  CV: regular rate and rhythm, normal S1 S2, no S3 or S4, no murmur, click or rub, no peripheral edema and peripheral pulses strong  MS: no gross musculoskeletal defects noted, R wrist nontender to palpation, full ROM and normal strength, no edema  PSYCH: mentation appears normal, affect normal/bright                    DME (Durable Medical Equipment) Orders and Documentation  Orders Placed This Encounter   Procedures     SAD Light, 10,000 Lux Order      The patient was assessed and it was determined the patient is in need of the following listed DME Supplies/Equipment. Please complete supporting documentation below to demonstrate medical necessity.      DME All Other Item(s) Documentation    List reason for need and supporting documentation for medical necessity below for each DME item.     1. Patient would benefit from " a SAD Light, 10,000 Lux strength due to history of season affective disorder and worsening mood during fall/winter months.

## 2023-05-03 ENCOUNTER — MYC MEDICAL ADVICE (OUTPATIENT)
Dept: INTERNAL MEDICINE | Facility: CLINIC | Age: 43
End: 2023-05-03
Payer: COMMERCIAL

## 2023-05-03 DIAGNOSIS — J30.89 CHRONIC NON-SEASONAL ALLERGIC RHINITIS: ICD-10-CM

## 2023-05-04 RX ORDER — MONTELUKAST SODIUM 10 MG/1
10 TABLET ORAL AT BEDTIME
Qty: 90 TABLET | Refills: 3 | Status: SHIPPED | OUTPATIENT
Start: 2023-05-04 | End: 2024-04-02

## 2023-05-04 NOTE — TELEPHONE ENCOUNTER
montelukast (SINGULAIR) 10 MG tablet      Last Written Prescription Date:  3-29-22  Last Fill Quantity: 90,   # refills: 3  Last Office Visit : 4-5-23  Future Office visit:  7-12-23  RF 90  Day;3

## 2023-05-05 ENCOUNTER — MYC MEDICAL ADVICE (OUTPATIENT)
Dept: INTERNAL MEDICINE | Facility: CLINIC | Age: 43
End: 2023-05-05
Payer: COMMERCIAL

## 2023-05-22 ENCOUNTER — MYC MEDICAL ADVICE (OUTPATIENT)
Dept: INTERNAL MEDICINE | Facility: CLINIC | Age: 43
End: 2023-05-22
Payer: COMMERCIAL

## 2023-06-07 DIAGNOSIS — F33.1 MAJOR DEPRESSIVE DISORDER, RECURRENT EPISODE, MODERATE (H): ICD-10-CM

## 2023-06-09 ENCOUNTER — TELEPHONE (OUTPATIENT)
Dept: CARE COORDINATION | Facility: CLINIC | Age: 43
End: 2023-06-09
Payer: COMMERCIAL

## 2023-06-09 NOTE — TELEPHONE ENCOUNTER
Prior Authorization Retail Medication Request    Medication/Dose: semaglutide (OZEMPIC) 2 MG/3ML SOPN pen       Coverage information:     Subscriber: [id number] WILMER DOMINGUEZ     Rel to sub: 01 - Self     Member ID: [id number]     Payor: 3-[employer] Ph: [phone]     Benefit plan: [id number]-[employer] OUT OF STATE Ph: [phone]     Group number: [id number]     Member effective dates: from 01/01/23

## 2023-06-09 NOTE — TELEPHONE ENCOUNTER
sertraline (ZOLOFT) 100 MG tablet        Last Written Prescription Date:  02-  Last Fill Quantity: 90,   # refills: 0  Last Office Visit : 4-5-2023  Future Office visit:  7-    Routing refill request to provider for review/approval because:    SSRIs Protocol Failed 06/07/2023 02:51 AM   Protocol Details  PHQ-9 score less than 5 in past 6 months

## 2023-06-12 RX ORDER — SERTRALINE HYDROCHLORIDE 100 MG/1
100 TABLET, FILM COATED ORAL DAILY
Qty: 90 TABLET | Refills: 0 | Status: SHIPPED | OUTPATIENT
Start: 2023-06-12 | End: 2023-07-12

## 2023-06-15 NOTE — TELEPHONE ENCOUNTER
PA Initiation    Medication: OZEMPIC (0.25 OR 0.5 MG/DOSE) 2 MG/3ML SC SOPN  Insurance Company: [facility] [employer] - Phone [phone] Fax [phone]  [id number] [place], [place] - [address] [place] [address] [address]  Filling Pharmacy Phone: [phone]  Filling Pharmacy Fax: [phone]  Start Date: 6/14/2023

## 2023-06-15 NOTE — TELEPHONE ENCOUNTER
Prior Authorization Not Needed per Insurance    Medication: OZEMPIC (0.25 OR 0.5 MG/DOSE) 2 MG/3ML SC SOPN  Insurance Company: [facility] [employer] - Phone [phone] Fax [phone]  Expected CoPay:      Ph[id number] [place], [place] - [address]  Pharmacy Notified: Yes  Patient Notified: Yes **Instructed pharmacy to notify patient when script is ready to /ship.**    Per insurance plan medication is currently covered.  Starting 7/1/23 it will need a PA.  They are unable to do a PA before 7/1/23

## 2023-06-16 NOTE — TELEPHONE ENCOUNTER
Pt informed of PA not needed with [employer] message. Pt notified that will re-sent on July 1st.     JOHNY GRAHAM RN on 6/16/2023 at 12:19 PM

## 2023-06-18 ENCOUNTER — HEALTH MAINTENANCE LETTER (OUTPATIENT)
Age: 43
End: 2023-06-18

## 2023-07-05 ENCOUNTER — TELEPHONE (OUTPATIENT)
Dept: CARE COORDINATION | Facility: CLINIC | Age: 43
End: 2023-07-05
Payer: COMMERCIAL

## 2023-07-05 NOTE — TELEPHONE ENCOUNTER
Prior Authorization Retail Medication Request    Medication/Dose: semaglutide (OZEMPIC) 2 MG/3ML SOPN pen  ICD code (if different than what is on RX):    Previously Tried and Failed:    Rationale:      Insurance Name:    Insurance ID:    Coverage information:     Subscriber: [id number] WILMER DOMINGUEZ     Rel to sub: 01 - Self     Member ID: [id number]     Payor: [facility] Ph: [phone]     Benefit plan: [facility] OUT OF STATE Ph: [phone]     Group number: [id number]     Member effective dates: from 01/01/23          Pharmacy Information (if different than what is on RX)  Name:    Phone:

## 2023-07-07 ENCOUNTER — TELEPHONE (OUTPATIENT)
Dept: INTERNAL MEDICINE | Facility: CLINIC | Age: 43
End: 2023-07-07
Payer: COMMERCIAL

## 2023-07-07 DIAGNOSIS — E66.813 CLASS 3 SEVERE OBESITY DUE TO EXCESS CALORIES WITH BODY MASS INDEX (BMI) OF 45.0 TO 49.9 IN ADULT, UNSPECIFIED WHETHER SERIOUS COMORBIDITY PRESENT (H): Primary | ICD-10-CM

## 2023-07-07 DIAGNOSIS — E66.01 CLASS 3 SEVERE OBESITY DUE TO EXCESS CALORIES WITH BODY MASS INDEX (BMI) OF 45.0 TO 49.9 IN ADULT, UNSPECIFIED WHETHER SERIOUS COMORBIDITY PRESENT (H): Primary | ICD-10-CM

## 2023-07-07 NOTE — TELEPHONE ENCOUNTER
Refill provided for semaglutide; she is due to increase dose to 1.7 mg. Keep upcoming appointment as scheduled.  LM Farr CNP

## 2023-07-07 NOTE — TELEPHONE ENCOUNTER
Health Call Center    Phone Message    May a detailed message be left on voicemail: yes     Reason for Call: Medication Question or concern regarding medication   Prescription Clarification  Name of Medication: Ozempic 1 mg  Prescribing Provider: Corina Judge NP   Pharmacy:      [facility] [id number] [place] [address]     What on the order needs clarification?  Patient needs a new prescription of Ozempic to be sent over to the pharmacy today. She accidentally threw away the other pen that had some medication in it. She needs this today so that she can get her dose in today.           Action Taken: Message routed to:  [facility]    Travel Screening: Not Applicable

## 2023-07-09 NOTE — TELEPHONE ENCOUNTER
PA Initiation    Medication: OZEMPIC (0.25 OR 0.5 MG/DOSE) 2 MG/3ML SC SOPN  Insurance Company: [facility] [employer] - Phone [phone] Fax [phone]  [id number] [place], [place] - [address] [place] [address] [address]  Filling Pharmacy Phone: [phone]  Filling Pharmacy Fax: [phone]  Start Date: 7/9/2023

## 2023-07-10 NOTE — TELEPHONE ENCOUNTER
PRIOR AUTHORIZATION DENIED    Medication: OZEMPIC (0.25 OR 0.5 MG/DOSE) 2 MG/3ML SC SOPN    Insurance Company: [facility] [employer] - Phone [phone] Fax [phone]    Denial Date: 7/9/2023    Denial Rational: Patient is not using to treat type 2 diabetes.     Appeal Information:

## 2023-07-11 ENCOUNTER — TELEPHONE (OUTPATIENT)
Dept: CARE COORDINATION | Facility: CLINIC | Age: 43
End: 2023-07-11
Payer: COMMERCIAL

## 2023-07-11 NOTE — TELEPHONE ENCOUNTER
Prior Authorization Not Needed per Insurance    Medication: WEGOVY 1.7 MG/0.75ML SC SOAJ  Insurance Company: [facility] - Phone [phone] Fax [phone]  Expected CoPay:      [id number] [place] [place] - 0 [facility] [place] [address]  Pharmacy Notified: Yes  Patient Notified: Yes **Instructed pharmacy to notify patient when script is ready to /ship.**    This was approved via EPA

## 2023-07-11 NOTE — TELEPHONE ENCOUNTER
Prior Authorization Retail Medication Request    Medication/Dose: Semaglutide-Weight Management (WEGOVY) 1.7 MG/0.75ML pen  ICD code (if different than what is on RX):    Previously Tried and Failed:    Rationale:      Insurance Name:    Insurance ID:    Coverage information:     Subscriber: [id number] WILMER DOMINGUEZ     Rel to sub: 01 - Self     Member ID: [id number]     Payor: [employer] Ph: [phone]     Benefit plan: [employer] OUT OF STATE Ph: [phone]     Group number: [id number]     Member effective dates: from 01/01/23          Pharmacy Information (if different than what is on RX)  Name:    Phone:

## 2023-07-12 ENCOUNTER — OFFICE VISIT (OUTPATIENT)
Dept: INTERNAL MEDICINE | Facility: CLINIC | Age: 43
End: 2023-07-12
Payer: COMMERCIAL

## 2023-07-12 VITALS
DIASTOLIC BLOOD PRESSURE: 89 MMHG | WEIGHT: 258.2 LBS | SYSTOLIC BLOOD PRESSURE: 126 MMHG | OXYGEN SATURATION: 99 % | BODY MASS INDEX: 42.97 KG/M2 | HEART RATE: 74 BPM

## 2023-07-12 DIAGNOSIS — E66.01 CLASS 3 SEVERE OBESITY DUE TO EXCESS CALORIES WITH BODY MASS INDEX (BMI) OF 45.0 TO 49.9 IN ADULT, UNSPECIFIED WHETHER SERIOUS COMORBIDITY PRESENT (H): ICD-10-CM

## 2023-07-12 DIAGNOSIS — L73.9 FOLLICULITIS: ICD-10-CM

## 2023-07-12 DIAGNOSIS — F33.1 MAJOR DEPRESSIVE DISORDER, RECURRENT EPISODE, MODERATE (H): ICD-10-CM

## 2023-07-12 DIAGNOSIS — R73.03 PREDIABETES: Primary | ICD-10-CM

## 2023-07-12 DIAGNOSIS — E66.813 CLASS 3 SEVERE OBESITY DUE TO EXCESS CALORIES WITH BODY MASS INDEX (BMI) OF 45.0 TO 49.9 IN ADULT, UNSPECIFIED WHETHER SERIOUS COMORBIDITY PRESENT (H): ICD-10-CM

## 2023-07-12 DIAGNOSIS — J45.20 MILD INTERMITTENT ASTHMA WITHOUT COMPLICATION: ICD-10-CM

## 2023-07-12 DIAGNOSIS — E78.5 HYPERLIPIDEMIA LDL GOAL <100: ICD-10-CM

## 2023-07-12 DIAGNOSIS — K76.0 NAFLD (NONALCOHOLIC FATTY LIVER DISEASE): ICD-10-CM

## 2023-07-12 PROCEDURE — 99214 OFFICE O/P EST MOD 30 MIN: CPT | Performed by: NURSE PRACTITIONER

## 2023-07-12 RX ORDER — BUPROPION HYDROCHLORIDE 150 MG/1
150 TABLET ORAL EVERY MORNING
Qty: 90 TABLET | Refills: 1 | Status: SHIPPED | OUTPATIENT
Start: 2023-07-12 | End: 2024-01-19

## 2023-07-12 RX ORDER — ALBUTEROL SULFATE 90 UG/1
2 AEROSOL, METERED RESPIRATORY (INHALATION) EVERY 6 HOURS
Qty: 8.5 G | Refills: 2 | Status: SHIPPED | OUTPATIENT
Start: 2023-07-12

## 2023-07-12 RX ORDER — CLINDAMYCIN PHOSPHATE 10 MG/G
GEL TOPICAL 2 TIMES DAILY
Qty: 60 G | Refills: 0 | Status: SHIPPED | OUTPATIENT
Start: 2023-07-12 | End: 2024-01-19

## 2023-07-12 RX ORDER — SERTRALINE HYDROCHLORIDE 100 MG/1
100 TABLET, FILM COATED ORAL DAILY
Qty: 90 TABLET | Refills: 3 | Status: SHIPPED | OUTPATIENT
Start: 2023-07-12 | End: 2024-10-02

## 2023-07-12 NOTE — PROGRESS NOTES
Assessment & Plan     Major depressive disorder, recurrent episode, moderate (H)  Feels mood is well-controlled on current regimen, refill provided.  - buPROPion (WELLBUTRIN XL) 150 MG 24 hr tablet; Take 1 tablet (150 mg) by mouth every morning  - sertraline (ZOLOFT) 100 MG tablet; Take 1 tablet (100 mg) by mouth daily    Class 3 severe obesity due to excess calories with body mass index (BMI) of 45.0 to 49.9 in adult, unspecified whether serious comorbidity present (H)  Prior auth just approved for Wegovy. Will start at prior tolerated dose (1 mg) prior to increasing to 1.7 mg weekly x4 weeks, then increase to 2.4 mg weekly. If side effects, can step back to previously tolerated dose for additional 4 weeks before further up-titration. Encouraged continue working on healthy diet and increasing physical activity. She agrees with the plan.  - Semaglutide-Weight Management (WEGOVY) 1.7 MG/0.75ML pen; Inject 1.7 mg Subcutaneous once a week for 28 days, THEN 2.4 mg once a week for 56 days.    Mild intermittent asthma without complication  - albuterol (PROAIR HFA/PROVENTIL HFA/VENTOLIN HFA) 108 (90 Base) MCG/ACT inhaler; Inhale 2 puffs into the lungs every 6 hours    Prediabetes  Recheck labs.  - Hemoglobin A1c; Future  - Comprehensive metabolic panel (BMP + Alb, Alk Phos, ALT, AST, Total. Bili, TP); Future    NAFLD (nonalcoholic fatty liver disease)  She will return for fasting labs. Will recommend starting statin if LDL > 100.  - Lipid panel reflex to direct LDL Fasting; Future    Folliculitis  Use clindamycin gel BID over next 2 weeks. Encouraged careful skin hygiene, change out of sweaty workout clothing/undergarments promptly.  - clindamycin (CLINDAMAX) 1 % external gel; Apply topically 2 times daily To rash on breasts    MDM: 2+ problem visit, prescription drug management       Return in about 6 months (around 1/12/2024).    LM Farr [facility] INTERNAL MEDICINE  YAIMA Kapadia is a 42 year old, presenting for the following health issues:  Follow Up (Pt here for follow up and would like to discuss insurance concerns with recent layoff)    HPI       Medication follow-up. Started Semaglutide following last visit to help with weight loss. Feeling well overall, mild GI symptoms on day of injection. Down 22 lbs on her home scale. Finds this has significantly helped her with reducing portion control. Also doing 12 hr intermittent fasting.  Got laid off 2 weeks ago, has insurance through August 1st.   Last 2 weeks hasn't been exercising as much. Plans to walk more with upcoming travel to [place], [place] for [employer].   Feels mood is doing well on current regimen of Sertraline 100 mg and Wellbutrin  mg.  Breathing is fine, but requests albuterol inhaler to have available if needed as her old one is >2 years old.  Has a new skin rash/lesions on underside of bilateral breasts    Review of Systems   Constitutional, HEENT, cardiovascular, pulmonary, gi and gu systems are negative, except as otherwise noted.      Objective    /89 (BP Location: Right arm, Patient Position: Sitting, Cuff Size: Adult Large)   Pulse 74   Wt 117.1 kg (258 lb 3.2 oz)   SpO2 99%   BMI 42.97 kg/m    Body mass index is 42.97 kg/m .  Physical Exam   GENERAL: healthy, alert and no distress  HENT: ear canals and TM's normal, nose and mouth without ulcers or lesions  NECK: no adenopathy, no asymmetry, masses, or scars and thyroid normal to palpation  RESP: lungs clear to auscultation - no rales, rhonchi or wheezes  CV: regular rate and rhythm, normal S1 S2, no S3 or S4, no murmur, click or rub, no peripheral edema and peripheral pulses strong  ABDOMEN: soft, nontender, no hepatosplenomegaly, no masses and bowel sounds normal  MS: no gross musculoskeletal defects noted, no edema  NEURO: Normal strength and tone, mentation intact and speech normal  SKIN: scattered erythematous papules  on underside of bilateral breasts, no drainage  PSYCH: mentation appears normal, affect normal/bright

## 2023-07-12 NOTE — NURSING NOTE
Steffi Hernandez is a 42 year old female that presents in clinic today for the following:     Chief Complaint   Patient presents with     Follow Up     Pt here for follow up and would like to discuss insurance concerns with recent layoff       The patient's allergies and medications were reviewed. The patient's vitals were obtained without incident. The patient does not have any other questions for the provider.     Abraham Howard, EMT at 5:03 PM on 7/12/2023.  Primary Care Clinic: [phone]

## 2023-07-17 ENCOUNTER — TELEPHONE (OUTPATIENT)
Dept: INTERNAL MEDICINE | Facility: CLINIC | Age: 43
End: 2023-07-17

## 2023-07-17 ENCOUNTER — LAB (OUTPATIENT)
Dept: LAB | Facility: CLINIC | Age: 43
End: 2023-07-17
Payer: COMMERCIAL

## 2023-07-17 DIAGNOSIS — K76.0 NAFLD (NONALCOHOLIC FATTY LIVER DISEASE): ICD-10-CM

## 2023-07-17 DIAGNOSIS — R73.03 PREDIABETES: ICD-10-CM

## 2023-07-17 LAB
ALBUMIN SERPL BCG-MCNC: 4.3 G/DL (ref 3.5–5.2)
ALP SERPL-CCNC: 80 U/L (ref 35–104)
ALT SERPL W P-5'-P-CCNC: 35 U/L (ref 0–50)
ANION GAP SERPL CALCULATED.3IONS-SCNC: 10 MMOL/L (ref 7–15)
AST SERPL W P-5'-P-CCNC: 41 U/L (ref 0–45)
BILIRUB SERPL-MCNC: 0.5 MG/DL
BUN SERPL-MCNC: 6.4 MG/DL (ref 6–20)
CALCIUM SERPL-MCNC: 9.3 MG/DL (ref 8.6–10)
CHLORIDE SERPL-SCNC: 107 MMOL/L (ref 98–107)
CHOLEST SERPL-MCNC: 213 MG/DL
CREAT SERPL-MCNC: 0.78 MG/DL (ref 0.51–0.95)
DEPRECATED HCO3 PLAS-SCNC: 24 MMOL/L (ref 22–29)
GFR SERPL CREATININE-BSD FRML MDRD: >90 ML/MIN/1.73M2
GLUCOSE SERPL-MCNC: 115 MG/DL (ref 70–99)
HBA1C MFR BLD: 6.2 %
HDLC SERPL-MCNC: 64 MG/DL
LDLC SERPL CALC-MCNC: 132 MG/DL
NONHDLC SERPL-MCNC: 149 MG/DL
POTASSIUM SERPL-SCNC: 4.3 MMOL/L (ref 3.4–5.3)
PROT SERPL-MCNC: 7.7 G/DL (ref 6.4–8.3)
SODIUM SERPL-SCNC: 141 MMOL/L (ref 136–145)
TRIGL SERPL-MCNC: 83 MG/DL

## 2023-07-17 PROCEDURE — 36415 COLL VENOUS BLD VENIPUNCTURE: CPT

## 2023-07-17 PROCEDURE — 83036 HEMOGLOBIN GLYCOSYLATED A1C: CPT

## 2023-07-17 PROCEDURE — 80053 COMPREHEN METABOLIC PANEL: CPT

## 2023-07-17 PROCEDURE — 80061 LIPID PANEL: CPT

## 2023-07-17 NOTE — TELEPHONE ENCOUNTER
[facility] Call Center    Phone Message    May a detailed message be left on voicemail: no     Reason for Call: Medication Question or concern regarding medication   Prescription Clarification  Name of Medication: Semaglutide-Weight Management (WEGOVY) 1.7 MG/0.75ML pen     Prescribing Provider: Corina Long   [facility]   What on the order needs clarification? Pharmacy requesting call back to discuss a prior auth on the above medication

## 2023-07-18 RX ORDER — ATORVASTATIN CALCIUM 10 MG/1
10 TABLET, FILM COATED ORAL DAILY
Qty: 90 TABLET | Refills: 3 | Status: SHIPPED | OUTPATIENT
Start: 2023-07-18 | End: 2024-07-01

## 2023-07-19 ENCOUNTER — MYC MEDICAL ADVICE (OUTPATIENT)
Dept: INTERNAL MEDICINE | Facility: CLINIC | Age: 43
End: 2023-07-19
Payer: COMMERCIAL

## 2023-07-19 NOTE — TELEPHONE ENCOUNTER
RN returned call to pharmacy who is following up on PA request. RN informed that PA was approved, provided dates of approval and information from PA approval. Pharmacy reports they will order Wegovy, but have been having issues with supply. Encouraged them to have pt notify us to send elsewhere if they are unable to get it in. They report will need a new prescription for the 2.4 after the initial 28 days.     JOHNY GRAHAM RN on 7/19/2023 at 9:29 AM

## 2023-07-28 NOTE — TELEPHONE ENCOUNTER
Recommend cutting back Semaglutide to 1 mg for 2-3 weeks to see if this is better tolerated. If this is going well, okay to return to 1.7 mg for 4 weeks.  LM Farr CNP

## 2023-09-15 ENCOUNTER — MYC MEDICAL ADVICE (OUTPATIENT)
Dept: INTERNAL MEDICINE | Facility: CLINIC | Age: 43
End: 2023-09-15
Payer: COMMERCIAL

## 2023-10-10 ENCOUNTER — VIRTUAL VISIT (OUTPATIENT)
Dept: INTERNAL MEDICINE | Facility: CLINIC | Age: 43
End: 2023-10-10
Payer: COMMERCIAL

## 2023-10-10 ENCOUNTER — TELEPHONE (OUTPATIENT)
Dept: INTERNAL MEDICINE | Facility: CLINIC | Age: 43
End: 2023-10-10

## 2023-10-10 VITALS — WEIGHT: 244.6 LBS | BODY MASS INDEX: 40.7 KG/M2

## 2023-10-10 DIAGNOSIS — E66.813 CLASS 3 SEVERE OBESITY DUE TO EXCESS CALORIES WITH BODY MASS INDEX (BMI) OF 45.0 TO 49.9 IN ADULT, UNSPECIFIED WHETHER SERIOUS COMORBIDITY PRESENT (H): ICD-10-CM

## 2023-10-10 DIAGNOSIS — E66.01 CLASS 3 SEVERE OBESITY DUE TO EXCESS CALORIES WITH BODY MASS INDEX (BMI) OF 45.0 TO 49.9 IN ADULT, UNSPECIFIED WHETHER SERIOUS COMORBIDITY PRESENT (H): ICD-10-CM

## 2023-10-10 PROCEDURE — 99213 OFFICE O/P EST LOW 20 MIN: CPT | Mod: VID | Performed by: NURSE PRACTITIONER

## 2023-10-10 ASSESSMENT — PAIN SCALES - GENERAL: PAINLEVEL: NO PAIN (0)

## 2023-10-10 NOTE — TELEPHONE ENCOUNTER
PRIOR AUTHORIZATION DENIED    Medication: SEMAGLUTIDE-WEIGHT MANAGEMENT 1.7 MG/0.75ML SC SOAJ  Insurance Company: [employer] [place] - Phone [phone] Fax [phone]  Denial Date: 10/10/2023  Denial Rational:     Appeal Information:     Patient Notified: NO

## 2023-10-10 NOTE — LETTER
10/10/2023       RE: Steffi Hernandez  [address] [address]     Medication: SEMAGLUTIDE-WEIGHT MANAGEMENT 1.7 MG/0.75ML SC SOAJ  Insurance Company: [employer] [place] - Phone [phone] Fax [phone]  Denial Date: 10/10/2023  Denial Rational:     Appeal Information:       To whom it may concern:    This is a letter of medical necessity for Semaglutide (Brand names Ozempic or Wegovy); this patient has a history of morbid obesity, pre-diabetes, and non-alcoholic fatty liver disease (NAFLD).  In addition to reducing risk for cardiovascular disease associated with obesity, Semaglutide has been shown to reduce or delay the development of diabetes in individuals who have prediabetes.  NAFLD can progress to more severe nonalcoholic steatohepatitis (YO); early reports have also shown that GLP-1 receptor agonists such as Semaglutide have been associated with histologic resolution of YO.     Ms. Hernandez would benefit from Semaglutide for assistance in weight loss, reducing long-term risks associated with morbid obesity, as well as help prevent progression to diabetes or worsening liver disease.        Thank you for your consideration and allowing me to participate in the care of this patient.      Sincerely,    LM Farr CNP

## 2023-10-10 NOTE — PATIENT INSTRUCTIONS
**For crisis resources, please see the information at the end of this document**   Patient Education    Thank you for coming to the [facility] INTERNAL MEDICINE [place].     Lab Testing:  If you had lab testing today and your results are reassuring or normal they will be mailed to you or sent through WooMe within 7 days. If the lab tests need quick action we will call you with the results. The phone number we will call with results is # [phone]. If this is not the best number please call our clinic and change the number.     Medication Refills:  If you need any refills please call your pharmacy and they will contact us. Our fax number for refills is [phone].   Three business days of notice are needed for general medication refill requests.   Five business days of notice are needed for controlled substance refill requests.   If you need to change to a different pharmacy, please contact the new pharmacy directly. The new pharmacy will help you get your medications transferred.     Contact Us:  Please call [phone] during business hours (8-5:00 M-F).   If you have medication related questions after clinic hours, or on the weekend, please call [phone].     Financial Assistance [phone]   Medical Records [phone]     Thank you for visiting the Primary Care Center today at the [facility]! The following is some information about our clinic:     Primary Care Center Frequently-Asked Questions    (1) How do I schedule appointments at the [facility]?     Primary Care--to schedule or make changes to an existing appointment, please call our primary care line at [phone].    Labs--to schedule a lab appointment at the [facility] [place] you can use WooMe or call [phone]. If you have a [place] location that is closer to home, you can reach out to that location for scheduling options.     Imaging--if you need to schedule a CT, X-ray,  MRI, ultrasound, or other imaging study you can call [phone] to schedule at the Clinics and [facility] Center or any other [facility] imaging location.     Referrals--if a referral to another specialty was ordered you can expect a phone call from their scheduling team. If you have not heard from them in a week, please call us or send us a Yecuris message to check the status or get a scheduling number. Please note that this only applies to internal [facility] referrals. If the referral is external you would need to contact their office for scheduling.     (2) I have a question about my visit, who do I contact?     You can call us at the primary care line at [phone] to ask questions about your visit. You can also send a secure message through Yecuris, which is reviewed by clinic staff. Please note that Yecuris messages have a twenty-four to forty-eight business hour turnaround time and should not be used for urgent concerns.    (3) How will I get the results of my tests?    If you are signed up for Yecuris all tests will be released to you within twenty-four hours of resulting. Please allow three to five days for your doctor to review your results and place a note interpreting the results. If you do not have [employer] you will receive your results through mail seven to ten business days following the return of the tests. Please note that if there should be any urgent or concerning results that your doctor or their registered nurse will reach out to you the same day as the tests come back. If you have follow up questions about your results or would like to discuss the results in detail please schedule a follow up with your provider either in person or virtually.     (4) How do I get refills of my prescriptions?     You should always first contact your pharmacy for refills of your medications. If submitting a refill request on Yecuris, please be sure to submit the request only once--repeat requests  can cause delays in refill. If you are requesting a NEW medication or a medication related to new symptoms you will need to schedule an appointment with a provider prior to approval. Please note: Routine medication refills have up to one to three business day turnaround whereas controlled substances refills have up to five to seven business day turnaround.    (5) I have new symptoms, what do I do?     If you are having an immediate medical emergency, you should dial 911 for assistance.   For anything urgent that needs to be seen within a few hours to one day you should visit a local urgent care for assistance.  For non-urgent symptoms that need to be seen within a few days to a week you can schedule with an available provider in primary care by going to We [facility] It or calling [phone].   If you are not sure how serious your symptoms are or you would like to receive medical advice you can always call [phone] to speak with a triage nurse.

## 2023-10-10 NOTE — NURSING NOTE
Is the patient currently in the state of [place]? YES    Visit mode:VIDEO    If the visit is dropped, the patient can be reconnected by: VIDEO VISIT: Send to e-mail at: [email or web address]    Will anyone else be joining the visit? NO  (If patient encounters technical issues they should call [phone])    How would you like to obtain your AVS? MyChart    Are changes needed to the allergy or medication list? Pt stated no changes to allergies and Pt stated no med changes    Reason for visit: RECHECK and restart WEGOVY med with new ins    Patient will call clinic with updated insurance info.    Adrienne BEST

## 2023-10-10 NOTE — PROGRESS NOTES
"Steffi is a 43 year old who is being evaluated via a billable video visit.      How would you like to obtain your AVS? MyChart  If the video visit is dropped, the invitation should be resent by: Send to e-mail at: [email or web address]  Will anyone else be joining your video visit? No          Assessment & Plan     Class 3 severe obesity due to excess calories with body mass index (BMI) of 45.0 to 49.9 in adult, unspecified whether serious comorbidity present (H)  Will restart Semaglutide at low dose as she has been off this for 2 months. Previously tolerated well, had gotten up to 1.7 mg weekly. She plans to continue to work on healthy lifestyle--nutritious diet, portion control, and walking more on campus.   - semaglutide (OZEMPIC) 2 MG/3ML pen; Inject 0.25 mg Subcutaneous every 7 days for 28 days, THEN 0.5 mg every 7 days for 28 days, THEN 1 mg every 7 days for 28 days.  - Semaglutide-Weight Management (WEGOVY) 1.7 MG/0.75ML pen; Inject 1.7 mg Subcutaneous once a week for 28 days    MDM: 1 problem visit, prescription drug management     Follow-up as scheduled in January, sooner if any changes or concerns    LM Farr 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 Enjoying this so far.     Insurance ran out since beginning of Aug, so has been off Semaglutide since then.   Has been able to maintain and lose a little more weight over the last couple months; \"recognized what full feels like,\" and she and her partner have made some healthier choices about what foods to have in the house.   Had just increased semaglutide dose to 1.7 mg weekly before ran out.   Overall feeling well.  Gets a migraine once every 5 years, feels like one coming on. Slept " for 9 hr last night; sometimes if she rests well can stave it off, avoiding screens as much as possible.   Getting caught up with health maintenance now that she has insurance reinstated; mammo scheduled next week, due for pap.     Review of Systems   Constitutional, HEENT, cardiovascular, pulmonary, gi and gu systems are negative, except as otherwise noted.      Objective    Vitals - Patient Reported  Pain Score: No Pain (0)        Physical Exam   GENERAL: Healthy, alert and no distress  EYES: Eyes grossly normal to inspection.  No discharge or erythema, or obvious scleral/conjunctival abnormalities.  RESP: No audible wheeze, cough, or visible cyanosis.  No visible retractions or increased work of breathing.    SKIN: Visible skin clear. No significant rash, abnormal pigmentation or lesions.  NEURO: Cranial nerves grossly intact.  Mentation and speech appropriate for age.  PSYCH: Mentation appears normal, affect normal/bright, judgement and insight intact, normal speech and appearance well-groomed.                Video-Visit Details    Type of service:  Video Visit   Start time: 11:32 AM  End time: 11:41 AM    Originating Location (pt. Location): Office    Distant Location (provider location):  Off-site  Platform used for Video Visit: [email or web address]

## 2023-10-19 ENCOUNTER — HOSPITAL ENCOUNTER (OUTPATIENT)
Dept: MAMMOGRAPHY | Facility: CLINIC | Age: 43
Discharge: HOME | End: 2023-10-19
Attending: NURSE PRACTITIONER | Admitting: NURSE PRACTITIONER
Payer: COMMERCIAL

## 2023-10-19 DIAGNOSIS — Z12.31 VISIT FOR SCREENING MAMMOGRAM: ICD-10-CM

## 2023-10-19 PROCEDURE — 77067 SCR MAMMO BI INCL CAD: CPT

## 2023-10-26 NOTE — TELEPHONE ENCOUNTER
Medication Appeal Initiation    Medication: SEMAGLUTIDE-WEIGHT MANAGEMENT 1.7 MG/0.75ML SC SOAJ  Appeal Start Date:  10/26/2023  Insurance Company: [employer] [place] - Phone [phone] Fax [phone]   Insurance Phone:   Insurance Fax: [phone]  Comments:

## 2023-10-27 NOTE — TELEPHONE ENCOUNTER
MEDICATION APPEAL DENIED    Medication: SEMAGLUTIDE-WEIGHT MANAGEMENT 1.7 MG/0.75ML SC SOAJ  Insurance Company: [employer] [place] - Phone [phone] Fax [phone]   Denial Date: 10/27/2023  Denial Rational:     Second Level Appeal Information:       Patient Notified: No  Central Prior Authorization Team ONLY: Second level appeals will be managed by the clinic staff and provider. Please contact the [employer] Prior Authorization Team if additional information about the denial is needed.

## 2023-11-01 ENCOUNTER — IMMUNIZATION (OUTPATIENT)
Dept: NURSING | Facility: CLINIC | Age: 43
End: 2023-11-01
Payer: COMMERCIAL

## 2023-11-01 PROCEDURE — 90480 ADMN SARSCOV2 VAC 1/ONLY CMP: CPT

## 2023-11-01 PROCEDURE — 90471 IMMUNIZATION ADMIN: CPT

## 2023-11-01 PROCEDURE — 90686 IIV4 VACC NO PRSV 0.5 ML IM: CPT

## 2023-11-01 PROCEDURE — 91320 SARSCV2 VAC 30MCG TRS-SUC IM: CPT

## 2023-11-09 ENCOUNTER — MYC MEDICAL ADVICE (OUTPATIENT)
Dept: INTERNAL MEDICINE | Facility: CLINIC | Age: 43
End: 2023-11-09
Payer: COMMERCIAL

## 2023-11-09 ENCOUNTER — MYC MEDICAL ADVICE (OUTPATIENT)
Dept: OBGYN | Facility: CLINIC | Age: 43
End: 2023-11-09
Payer: COMMERCIAL

## 2023-11-13 ENCOUNTER — TELEPHONE (OUTPATIENT)
Dept: INTERNAL MEDICINE | Facility: CLINIC | Age: 43
End: 2023-11-13
Payer: COMMERCIAL

## 2023-11-13 NOTE — TELEPHONE ENCOUNTER
Prior Authorization Retail Medication Request    Medication/Dose: SEMAGLUTIDE-WEIGHT MANAGEMENT 1.7 MG/0.75ML SC SOAJ  Diagnosis and ICD code (if different than what is on RX):  Class 3 severe obesity due to excess calories with body mass index (BMI) of 45.0 to 49.9 in adult, unspecified whether serious comorbidity present (H) [E66.01, Z68.42]   New/renewal/insurance change PA/secondary ins. PA: Recent PA was done for old BCBS insurance - Patient has changed to [employer]  Previously Tried and Failed:    Rationale:      Insurance   Primary: [employer]  Insurance ID:  [id number] (self)      Pharmacy Information (if different than what is on RX)  Name:    Phone:    Fax:

## 2023-11-16 NOTE — TELEPHONE ENCOUNTER
Central Prior Authorization Team   Phone: [phone]    PA Initiation    Medication: SEMAGLUTIDE-WEIGHT MANAGEMENT 1.7 MG/0.75ML SC SOAJ  Insurance Company: [employer] - Phone [phone] Fax [phone]  [id number] [place] [place] [facility] [address] [facility] [address]  Filling Pharmacy Phone: [phone]  Filling Pharmacy Fax:    Start Date: 11/16/2023

## 2023-11-20 NOTE — TELEPHONE ENCOUNTER
Prior Authorization Approval    Authorization Effective Date: 11/16/2023  Authorization Expiration Date: 11/16/2024  Medication: SEMAGLUTIDE-WEIGHT MANAGEMENT 1.7 MG/0.75ML SC SOAJ-PA APPROVED   Approved Dose/Quantity:  UP TO 4 PENS PER 28 DAYS   Reference #:     Insurance Company: [employer] - Phone [phone] Fax [phone]  Expected CoPay:       CoPay Card Available:      Foundation Assistance Needed:    Which Pharmacy is filling the prescription (Not needed for infusion/clinic administered): [facility] [id number] [place] [address]  Pharmacy Notified:  Yes- **Instructed pharmacy to notify patient when script is ready to /ship.**  Patient Notified:  Yes

## 2024-01-03 NOTE — PROGRESS NOTES
Annual GYN Exam  1/4/2024    Reason for visit: Annual GYN exam    HPI: Patient is a 42 yo P0 who presents today for annual GYN exam.  Last seen in 4/2021 and at that time noted irregular cycles.  Came off OCP as did not like the way she felt on them and was started on Provera episodically if she went 3-4 months without a withdrawal bleed for endometrial protection.  Per [facility] report has now not had bleed since 6/2022, last time of Provera use, patient confirms this today.  Has been noticing what she things are night sweats and maybe some hot flashes.  Unsure if menopause related but has noticed them.  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  She and Tim still live in [place] and are doing well, stayed in town with family over holidays this year.  Has been using Wegovy for weight loss and has lost 40 pounds with this medication and happy about this.  Past OB/GYN History:  Nulligravid, no desires for future childbearing  Menses: Per HPI  No STI history  History of female and male partners, currently with male partner Tim, live in [place] together in their house  Contraception: Vasectomy  Pap smear history:   10/2018 had NILM, other HR HPV positive pap  10/2019 had NILM, other HR HPV positive pap  2/2020: Colposcopy negative for dysplasia on cervical biopsy and ECC  4/2021: NILM, Negative HPV pap  Due for cotesting 4/2024, collected today as here for annual GYN exam  No concerning discharge, No dyspareunia  Declines STI Screening today    Past Medical History:   Diagnosis Date    Abnormal Pap smear     Don't remember when it was and follow up clear    Abnormal Pap smear of cervix 2019    Allergic rhinitis, cause unspecified     Depressive disorder 2008    Migraines 1998    Obesity     Obstructive sleep apnea     Other acne     Polycystic ovary syndrome 2013    Sleep apnea     No treatment at this time.    Uncomplicated asthma      Borderline - exercise induced    Unspecified urinary incontinence     Varicella 1985      Patient Active Problem List   Diagnosis    Fatigue    Heavy menses    Memory difficulty    Allergic rhinosinusitis    CARDIOVASCULAR SCREENING; LDL GOAL LESS THAN 160    Acne    Acute maxillary sinusitis    Vitamin B12 deficiency without anemia    Vitamin D deficiency    Ascorbic acid deficiency    Iron deficiency    Moderate major depression (H)    Hirsutism    Pyridoxine deficiency    Dysmenorrhea    Family history of diabetes mellitus    Major depressive disorder, recurrent episode, moderate (HCC)    Morbid obesity, unspecified obesity type (H)    Chronic fatigue    Menorrhagia with regular cycle    Allergic rhinitis, unspecified allergic rhinitis type    LANDRY (obstructive sleep apnea)    Morbid obesity due to excess calories (H)    Sleep apnea, obstructive    Deviated nasal septum    Encounter for long-term (current) use of high-risk medication    Obesity    Obstructive sleep apnea    Nasal obstruction      Past Surgical History:   Procedure Laterality Date    BIOPSY  2011    Lump in breast - diagnosis was fatty tumor    GENITOURINARY SURGERY  1986    Urethrotomy    SEPTOPLASTY, TURBINOPLASTY, COMBINED N/A 5/18/2016    Procedure: COMBINED SEPTOPLASTY, TURBINOPLASTY;  Surgeon: Baldev Perez MD;  Location: [facility] OR    SEPTOPLASTY, TURBINOPLASTY, COMBINED N/A 1/2/2023    Procedure: SEPTOPLASTY, NOSE bilateral Turbinate Reduction, bilateral;  Surgeon: Krishan Cheung MD;  [place] [facility] [place]    [facility] NONSPECIFIC PROCEDURE      Urology surgery for night time bed wetting at age 6.      albuterol (PROAIR HFA/PROVENTIL HFA/VENTOLIN HFA) 108 (90 Base) MCG/ACT inhaler, Inhale 2 puffs into the lungs every 6 hours  atorvastatin (LIPITOR) 10 MG tablet, Take 1 tablet (10 mg) by mouth daily  buPROPion (WELLBUTRIN XL) 150 MG 24 hr tablet, Take 1 tablet (150 mg) by mouth every morning  cetirizine (ZYRTEC) 10 MG tablet, Take 10 mg by mouth  daily  clindamycin (CLINDAMAX) 1 % external gel, Apply topically 2 times daily To rash on breasts  montelukast (SINGULAIR) 10 MG tablet, Take 1 tablet (10 mg) by mouth At Bedtime  Semaglutide-Weight Management (WEGOVY) 1.7 MG/0.75ML pen, Inject 1.7 mg Subcutaneous once a week  sertraline (ZOLOFT) 100 MG tablet, Take 1 tablet (100 mg) by mouth daily  traZODone (DESYREL) 50 MG tablet, Take 1 tablet (50 mg) by mouth At Bedtime  medroxyPROGESTERone (PROVERA) 10 MG tablet, Take 1 tablet every day for 10 days every 3-4 months for withdrawal bleed (Patient not taking: Reported on 2024)  [DISCONTINUED] Ferrous Sulfate (SPATONE PUR-ABSORB IRON) 5 MG/20ML LIQD, Take 1 packet by mouth 2 times daily (before meals). INDICATION: TO CORRECT IRON DEFICIENCY - PLEASE TAKE WITH EMERGEN-C MIXED IN 8  8 OZ OF WATER    No current facility-administered medications on file prior to visit.     Allergies   Allergen Reactions    Animal Dander     Cats     Dogs     Rabbit Protein     Seasonal Allergies       Social History     Tobacco Use    Smoking status: Never    Smokeless tobacco: Never   Substance Use Topics    Alcohol use: Yes     Comment: 6 drinks weekly    Drug use: No      Family History   Problem Relation Age of Onset    Cerebrovascular Disease Father         x2    Hypertension Father     Seizure Disorder Father     Substance Abuse Father     Diabetes Paternal Grandfather     Cancer Paternal Grandfather           from throat cancer. Also had melanoma.    Blood Disease Paternal Grandfather         B12 DEFICIENCY - WAS ON B12 SHOTS    Substance Abuse Paternal Grandfather     Obesity Paternal Grandfather     Cancer Maternal Grandmother          of colon cancer    Colon Cancer Maternal Grandmother     Substance Abuse Maternal Grandmother     Cerebrovascular Disease Maternal Grandfather     Heart Disease Maternal Grandfather              Substance Abuse Maternal Grandfather     Neurologic Disorder  "Sister         Epilepsy diagnosed 1988    Neurologic Disorder Sister         Epilepsy diagnosed 1998    Mental Illness Sister     Substance Abuse Sister     Substance Abuse Paternal Grandmother     Melanoma No family hx of     Skin Cancer No family hx of     Liver Disease No family hx of       ROS: A complete 10 point ROS was conducted and was negative aside from that noted in the HPI    O:BP (!) 141/93   Pulse 81   Ht 1.651 m (5' 5\")   Wt 107.8 kg (237 lb 11.2 oz)   BMI 39.56 kg/m     General: NAD, A&Ox3  Breasts: Deferred  Abdomen: Soft, NT, ND  Genitourinary:    External Genitalia:  General appearance; normal, Hair distribution; normal, Lesions absent  Urethral Meatus:  Size normal, Location normal, Lesions absent  Urethra:  Fullness absent, Masses absent  Bladder:  Fullness absent, Masses absent, Tenderness absent  Vagina:  General appearance normal, Estrogen effect normal, Discharge absent, Lesions absent  Cervix:  General appearance normal, Lesions absent, Discharge absent, Tenderness absent, Enlargement absent  Uterus:  Size normal, Masses absent, Tenderness absent  Adenexa:  No masses appreciated  Anus/Perineum:  Lesions absent      A/P: 42 yo P0 presents for annual GYN exam  1) Normal breast and pelvic exam  2) Screening for malignant neoplasm of cervix: Due for cotesting today, collected  3) Screening for breast cancer: UTD, Mammogram 10/2023 BIRADS-1  4) Irregular menses, Vasomotor symptoms:Possible perimenopause.  Will check FSH, Estradiol, LH, TSH to see where levels are.  Depending on results can discuss options for management.  5) Mood: Doing well, happy with job change, is actually considering going down on her medication or stopping one of her depression meds as feeling really good, plans to talk with PCP about this.  6) UTD on diabetes and lipid screening with PCP, fasting glucose pre-diabetes level and patient aware, was started on Lipitor by PCP  7) RTC in 1 year for annual, earlier with any " concerns    Anisha Diana MD

## 2024-01-04 ENCOUNTER — OFFICE VISIT (OUTPATIENT)
Dept: OBGYN | Facility: CLINIC | Age: 44
End: 2024-01-04
Attending: OBSTETRICS & GYNECOLOGY
Payer: COMMERCIAL

## 2024-01-04 VITALS
HEIGHT: 65 IN | DIASTOLIC BLOOD PRESSURE: 93 MMHG | SYSTOLIC BLOOD PRESSURE: 141 MMHG | BODY MASS INDEX: 39.6 KG/M2 | HEART RATE: 81 BPM | WEIGHT: 237.7 LBS

## 2024-01-04 DIAGNOSIS — Z01.419 ENCOUNTER FOR GYNECOLOGICAL EXAMINATION WITHOUT ABNORMAL FINDING: Primary | ICD-10-CM

## 2024-01-04 DIAGNOSIS — R23.2 HOT FLASHES: ICD-10-CM

## 2024-01-04 DIAGNOSIS — Z12.4 SCREENING FOR MALIGNANT NEOPLASM OF CERVIX: ICD-10-CM

## 2024-01-04 PROCEDURE — 87624 HPV HI-RISK TYP POOLED RSLT: CPT | Performed by: OBSTETRICS & GYNECOLOGY

## 2024-01-04 PROCEDURE — G0145 SCR C/V CYTO,THINLAYER,RESCR: HCPCS | Performed by: OBSTETRICS & GYNECOLOGY

## 2024-01-04 PROCEDURE — 99396 PREV VISIT EST AGE 40-64: CPT | Performed by: OBSTETRICS & GYNECOLOGY

## 2024-01-04 PROCEDURE — 99215 OFFICE O/P EST HI 40 MIN: CPT | Mod: 25 | Performed by: OBSTETRICS & GYNECOLOGY

## 2024-01-04 ASSESSMENT — PATIENT HEALTH QUESTIONNAIRE - PHQ9
5. POOR APPETITE OR OVEREATING: NOT AT ALL
SUM OF ALL RESPONSES TO PHQ QUESTIONS 1-9: 3

## 2024-01-04 ASSESSMENT — ANXIETY QUESTIONNAIRES
7. FEELING AFRAID AS IF SOMETHING AWFUL MIGHT HAPPEN: NOT AT ALL
5. BEING SO RESTLESS THAT IT IS HARD TO SIT STILL: NOT AT ALL
6. BECOMING EASILY ANNOYED OR IRRITABLE: SEVERAL DAYS
GAD7 TOTAL SCORE: 4
GAD7 TOTAL SCORE: 4
3. WORRYING TOO MUCH ABOUT DIFFERENT THINGS: SEVERAL DAYS
1. FEELING NERVOUS, ANXIOUS, OR ON EDGE: SEVERAL DAYS
2. NOT BEING ABLE TO STOP OR CONTROL WORRYING: SEVERAL DAYS

## 2024-01-04 NOTE — LETTER
1/4/2024       RE: Steffi Hernandez  [address] [facility] [address]     Dear Colleague,    Thank you for referring your patient, Steffi Hernandez, to the [facility]. Please see a copy of my visit note below.    Annual GYN Exam  1/4/2024    Reason for visit: Annual GYN exam    HPI: Patient is a 42 yo P0 who presents today for annual GYN exam.  Last seen in 4/2021 and at that time noted irregular cycles.  Came off OCP as did not like the way she felt on them and was started on Provera episodically if she went 3-4 months without a withdrawal bleed for endometrial protection.  Per [facility] report has now not had bleed since 6/2022, last time of Provera use, patient confirms this today.  Has been noticing what she things are night sweats and maybe some hot flashes.  Unsure if menopause related but has noticed them.  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  She and Tim still live in [place] and are doing well, stayed in town with family over holidays this year.  Has been using Wegovy for weight loss and has lost 40 pounds with this medication and happy about this.  Past OB/GYN History:  Nulligravid, no desires for future childbearing  Menses: Per HPI  No STI history  History of female and male partners, currently with male partner Tim, live in [place] together in their house  Contraception: Vasectomy  Pap smear history:   10/2018 had NILM, other HR HPV positive pap  10/2019 had NILM, other HR HPV positive pap  2/2020: Colposcopy negative for dysplasia on cervical biopsy and ECC  4/2021: NILM, Negative HPV pap  Due for cotesting 4/2024, collected today as here for annual GYN exam  No concerning discharge, No dyspareunia  Declines STI Screening today    Past Medical History:   Diagnosis Date    Abnormal Pap smear     Don't remember when it was and  follow up clear    Abnormal Pap smear of cervix 2019    Allergic rhinitis, cause unspecified     Depressive disorder 2008    Migraines 1998    Obesity     Obstructive sleep apnea     Other acne     Polycystic ovary syndrome 2013    Sleep apnea     No treatment at this time.    Uncomplicated asthma     Borderline - exercise induced    Unspecified urinary incontinence     Varicella 1985      Patient Active Problem List   Diagnosis    Fatigue    Heavy menses    Memory difficulty    Allergic rhinosinusitis    CARDIOVASCULAR SCREENING; LDL GOAL LESS THAN 160    Acne    Acute maxillary sinusitis    Vitamin B12 deficiency without anemia    Vitamin D deficiency    Ascorbic acid deficiency    Iron deficiency    Moderate major depression (H)    Hirsutism    Pyridoxine deficiency    Dysmenorrhea    Family history of diabetes mellitus    Major depressive disorder, recurrent episode, moderate (HCC)    Morbid obesity, unspecified obesity type (H)    Chronic fatigue    Menorrhagia with regular cycle    Allergic rhinitis, unspecified allergic rhinitis type    LANDRY (obstructive sleep apnea)    Morbid obesity due to excess calories (H)    Sleep apnea, obstructive    Deviated nasal septum    Encounter for long-term (current) use of high-risk medication    Obesity    Obstructive sleep apnea    Nasal obstruction      Past Surgical History:   Procedure Laterality Date    BIOPSY  2011    Lump in breast - diagnosis was fatty tumor    GENITOURINARY SURGERY  1986    Urethrotomy    SEPTOPLASTY, TURBINOPLASTY, COMBINED N/A 5/18/2016    Procedure: COMBINED SEPTOPLASTY, TURBINOPLASTY;  Surgeon: Baldev Perez MD;  Location:  OR    SEPTOPLASTY, TURBINOPLASTY, COMBINED N/A 1/2/2023    Procedure: SEPTOPLASTY, NOSE bilateral Turbinate Reduction, bilateral;  Surgeon: Krishan Cheung MD;  Location: [facility] [place]    [facility] NONSPECIFIC PROCEDURE      Urology surgery for night time bed wetting at age 6.      albuterol (PROAIR HFA/PROVENTIL HFA/VENTOLIN HFA) 108  (90 Base) MCG/ACT inhaler, Inhale 2 puffs into the lungs every 6 hours  atorvastatin (LIPITOR) 10 MG tablet, Take 1 tablet (10 mg) by mouth daily  buPROPion (WELLBUTRIN XL) 150 MG 24 hr tablet, Take 1 tablet (150 mg) by mouth every morning  cetirizine (ZYRTEC) 10 MG tablet, Take 10 mg by mouth daily  clindamycin (CLINDAMAX) 1 % external gel, Apply topically 2 times daily To rash on breasts  montelukast (SINGULAIR) 10 MG tablet, Take 1 tablet (10 mg) by mouth At Bedtime  Semaglutide-Weight Management (WEGOVY) 1.7 MG/0.75ML pen, Inject 1.7 mg Subcutaneous once a week  sertraline (ZOLOFT) 100 MG tablet, Take 1 tablet (100 mg) by mouth daily  traZODone (DESYREL) 50 MG tablet, Take 1 tablet (50 mg) by mouth At Bedtime  medroxyPROGESTERone (PROVERA) 10 MG tablet, Take 1 tablet every day for 10 days every 3-4 months for withdrawal bleed (Patient not taking: Reported on 2024)  [DISCONTINUED] Ferrous Sulfate (SPATONE PUR-ABSORB IRON) 5 MG/20ML LIQD, Take 1 packet by mouth 2 times daily (before meals). INDICATION: TO CORRECT IRON DEFICIENCY - PLEASE TAKE WITH EMERGEN-C MIXED IN 8  8 OZ OF WATER    No current facility-administered medications on file prior to visit.     Allergies   Allergen Reactions    Animal Dander     Cats     Dogs     Rabbit Protein     Seasonal Allergies       Social History     Tobacco Use    Smoking status: Never    Smokeless tobacco: Never   Substance Use Topics    Alcohol use: Yes     Comment: 6 drinks weekly    Drug use: No      Family History   Problem Relation Age of Onset    Cerebrovascular Disease Father         x2    Hypertension Father     Seizure Disorder Father     Substance Abuse Father     Diabetes Paternal Grandfather     Cancer Paternal Grandfather           from throat cancer. Also had melanoma.    Blood Disease Paternal Grandfather         B12 DEFICIENCY - WAS ON B12 SHOTS    Substance Abuse Paternal Grandfather     Obesity Paternal Grandfather     Cancer Maternal  "Grandmother          of colon cancer    Colon Cancer Maternal Grandmother     Substance Abuse Maternal Grandmother     Cerebrovascular Disease Maternal Grandfather     Heart Disease Maternal Grandfather              Substance Abuse Maternal Grandfather     Neurologic Disorder Sister         Epilepsy diagnosed     Neurologic Disorder Sister         Epilepsy diagnosed     Mental Illness Sister     Substance Abuse Sister     Substance Abuse Paternal Grandmother     Melanoma No family hx of     Skin Cancer No family hx of     Liver Disease No family hx of       ROS: A complete 10 point ROS was conducted and was negative aside from that noted in the HPI    O:BP (!) 141/93   Pulse 81   Ht 1.651 m (5' 5\")   Wt 107.8 kg (237 lb 11.2 oz)   BMI 39.56 kg/m     General: NAD, A&Ox3  Breasts: Deferred  Abdomen: Soft, NT, ND  Genitourinary:    External Genitalia:  General appearance; normal, Hair distribution; normal, Lesions absent  Urethral Meatus:  Size normal, Location normal, Lesions absent  Urethra:  Fullness absent, Masses absent  Bladder:  Fullness absent, Masses absent, Tenderness absent  Vagina:  General appearance normal, Estrogen effect normal, Discharge absent, Lesions absent  Cervix:  General appearance normal, Lesions absent, Discharge absent, Tenderness absent, Enlargement absent  Uterus:  Size normal, Masses absent, Tenderness absent  Adenexa:  No masses appreciated  Anus/Perineum:  Lesions absent      A/P: 42 yo P0 presents for annual GYN exam  1) Normal breast and pelvic exam  2) Screening for malignant neoplasm of cervix: Due for cotesting today, collected  3) Screening for breast cancer: UTD, Mammogram 10/2023 BIRADS-1  4) Irregular menses, Vasomotor symptoms:Possible perimenopause.  Will check FSH, Estradiol, LH, TSH to see where levels are.  Depending on results can discuss options for management.  5) Mood: Doing well, happy with job change, is actually considering going " down on her medication or stopping one of her depression meds as feeling really good, plans to talk with PCP about this.  6) UTD on diabetes and lipid screening with PCP, fasting glucose pre-diabetes level and patient aware, was started on Lipitor by PCP  7) RTC in 1 year for annual, earlier with any concerns    Anisha Diana MD

## 2024-01-04 NOTE — PATIENT INSTRUCTIONS
Thank you for trusting us with your care!     If you need to contact us for questions about:  Symptoms, Scheduling & Medical Questions; Non-urgent (2-3 day response) Robbin message, Urgent (needing response today) [phone] (if after 3:30pm next day response)   Prescriptions: Please call your Pharmacy   Billing: [facility] [phone] or [facility]:[phone]

## 2024-01-08 LAB
BKR LAB AP GYN ADEQUACY: NORMAL
BKR LAB AP GYN INTERPRETATION: NORMAL
BKR LAB AP HPV REFLEX: NORMAL
BKR LAB AP PREVIOUS ABNORMAL: NORMAL
PATH REPORT.COMMENTS IMP SPEC: NORMAL
PATH REPORT.COMMENTS IMP SPEC: NORMAL
PATH REPORT.RELEVANT HX SPEC: NORMAL

## 2024-01-09 LAB
HUMAN PAPILLOMA VIRUS 16 DNA: NEGATIVE
HUMAN PAPILLOMA VIRUS 18 DNA: NEGATIVE
HUMAN PAPILLOMA VIRUS FINAL DIAGNOSIS: NORMAL
HUMAN PAPILLOMA VIRUS OTHER HR: NEGATIVE

## 2024-01-13 ENCOUNTER — MYC REFILL (OUTPATIENT)
Dept: INTERNAL MEDICINE | Facility: CLINIC | Age: 44
End: 2024-01-13

## 2024-01-13 ENCOUNTER — LAB (OUTPATIENT)
Dept: LAB | Facility: CLINIC | Age: 44
End: 2024-01-13
Payer: COMMERCIAL

## 2024-01-13 DIAGNOSIS — R23.2 HOT FLASHES: ICD-10-CM

## 2024-01-13 DIAGNOSIS — E66.01 CLASS 3 SEVERE OBESITY DUE TO EXCESS CALORIES WITH BODY MASS INDEX (BMI) OF 45.0 TO 49.9 IN ADULT, UNSPECIFIED WHETHER SERIOUS COMORBIDITY PRESENT (H): ICD-10-CM

## 2024-01-13 DIAGNOSIS — E66.813 CLASS 3 SEVERE OBESITY DUE TO EXCESS CALORIES WITH BODY MASS INDEX (BMI) OF 45.0 TO 49.9 IN ADULT, UNSPECIFIED WHETHER SERIOUS COMORBIDITY PRESENT (H): ICD-10-CM

## 2024-01-13 LAB
ESTRADIOL SERPL-MCNC: 221 PG/ML
FSH SERPL IRP2-ACNC: 18.3 MIU/ML
LH SERPL-ACNC: 23.3 MIU/ML
TSH SERPL DL<=0.005 MIU/L-ACNC: 1.37 UIU/ML (ref 0.3–4.2)

## 2024-01-13 PROCEDURE — 83002 ASSAY OF GONADOTROPIN (LH): CPT

## 2024-01-13 PROCEDURE — 82670 ASSAY OF TOTAL ESTRADIOL: CPT

## 2024-01-13 PROCEDURE — 84443 ASSAY THYROID STIM HORMONE: CPT

## 2024-01-13 PROCEDURE — 36415 COLL VENOUS BLD VENIPUNCTURE: CPT

## 2024-01-13 PROCEDURE — 83001 ASSAY OF GONADOTROPIN (FSH): CPT

## 2024-01-19 ENCOUNTER — OFFICE VISIT (OUTPATIENT)
Dept: INTERNAL MEDICINE | Facility: CLINIC | Age: 44
End: 2024-01-19
Payer: COMMERCIAL

## 2024-01-19 VITALS
HEART RATE: 86 BPM | HEIGHT: 65 IN | WEIGHT: 234.6 LBS | BODY MASS INDEX: 39.09 KG/M2 | SYSTOLIC BLOOD PRESSURE: 123 MMHG | OXYGEN SATURATION: 98 % | DIASTOLIC BLOOD PRESSURE: 88 MMHG

## 2024-01-19 DIAGNOSIS — R73.03 PREDIABETES: Primary | ICD-10-CM

## 2024-01-19 DIAGNOSIS — F33.1 MAJOR DEPRESSIVE DISORDER, RECURRENT EPISODE, MODERATE (H): ICD-10-CM

## 2024-01-19 DIAGNOSIS — E78.5 HYPERLIPIDEMIA LDL GOAL <100: ICD-10-CM

## 2024-01-19 DIAGNOSIS — E66.01 CLASS 3 SEVERE OBESITY DUE TO EXCESS CALORIES WITH BODY MASS INDEX (BMI) OF 45.0 TO 49.9 IN ADULT, UNSPECIFIED WHETHER SERIOUS COMORBIDITY PRESENT (H): ICD-10-CM

## 2024-01-19 DIAGNOSIS — K76.0 NAFLD (NONALCOHOLIC FATTY LIVER DISEASE): ICD-10-CM

## 2024-01-19 DIAGNOSIS — E66.813 CLASS 3 SEVERE OBESITY DUE TO EXCESS CALORIES WITH BODY MASS INDEX (BMI) OF 45.0 TO 49.9 IN ADULT, UNSPECIFIED WHETHER SERIOUS COMORBIDITY PRESENT (H): ICD-10-CM

## 2024-01-19 PROCEDURE — 99214 OFFICE O/P EST MOD 30 MIN: CPT | Performed by: NURSE PRACTITIONER

## 2024-01-19 RX ORDER — BUPROPION HYDROCHLORIDE 150 MG/1
150 TABLET ORAL EVERY MORNING
Qty: 90 TABLET | Refills: 1 | Status: SHIPPED | OUTPATIENT
Start: 2024-01-19 | End: 2024-07-19

## 2024-01-19 ASSESSMENT — ASTHMA QUESTIONNAIRES
ACT_TOTALSCORE: 25
QUESTION_2 LAST FOUR WEEKS HOW OFTEN HAVE YOU HAD SHORTNESS OF BREATH: NOT AT ALL
QUESTION_1 LAST FOUR WEEKS HOW MUCH OF THE TIME DID YOUR ASTHMA KEEP YOU FROM GETTING AS MUCH DONE AT WORK, SCHOOL OR AT HOME: NONE OF THE TIME
QUESTION_3 LAST FOUR WEEKS HOW OFTEN DID YOUR ASTHMA SYMPTOMS (WHEEZING, COUGHING, SHORTNESS OF BREATH, CHEST TIGHTNESS OR PAIN) WAKE YOU UP AT NIGHT OR EARLIER THAN USUAL IN THE MORNING: NOT AT ALL
ACT_TOTALSCORE: 25
QUESTION_5 LAST FOUR WEEKS HOW WOULD YOU RATE YOUR ASTHMA CONTROL: COMPLETELY CONTROLLED
QUESTION_4 LAST FOUR WEEKS HOW OFTEN HAVE YOU USED YOUR RESCUE INHALER OR NEBULIZER MEDICATION (SUCH AS ALBUTEROL): NOT AT ALL

## 2024-01-19 NOTE — PROGRESS NOTES
"Steffi is a 43 year old that presents in clinic today for the following:     Chief Complaint   Patient presents with    Follow Up    Heartburn     Pt says that she was having an issue with heartburn since November 2023. For the past three weeks the heartburn has improved, however, she is wondering if the heartburn is associated with the Wegovy.          1/19/2024     2:19 PM   Additional Questions   Roomed by Doni Montalvo     Screenings as of today     ACT TOTAL SCORE (Goal Greater than or Equal to 20) 25        Doni Montalvo at 2:26 PM on 1/19/2024    Assessment & Plan   Problem List Items Addressed This Visit       Major depressive disorder, recurrent episode, moderate (HCC)    Relevant Medications    buPROPion (WELLBUTRIN XL) 150 MG 24 hr tablet    Obesity    Relevant Medications    Semaglutide-Weight Management (WEGOVY) 1.7 MG/0.75ML pen     Other Visit Diagnoses       Prediabetes    -  Primary    Relevant Orders    Hemoglobin A1c    Hyperlipidemia LDL goal <100        Relevant Orders    Lipid panel reflex to direct LDL Fasting    Folliculitis        NAFLD (nonalcoholic fatty liver disease)        Relevant Orders    Comprehensive metabolic panel (BMP + Alb, Alk Phos, ALT, AST, Total. Bili, TP)           BMI  Estimated body mass index is 39.04 kg/m  as calculated from the following:    Height as of this encounter: 1.651 m (5' 5\").    Weight as of this encounter: 106.4 kg (234 lb 9.6 oz).   Weight management plan: Discussed healthy diet and exercise guidelines    Encouraged continuing of healthy diet and exercise. Reviewed AHA exercise guidelines. Patient to follow-up by MyChart or E-visit if desires increased dose in Wegovy or noticing a plateau in weight loss.  If experiences worsening side effects at increased dose, discussed we can always resume previously tolerated dose.  She agrees with the plan.   Feels mood is well controlled on current regimen, no changes in plan today.   She will complete fasting " "labs next week.     Subjective   Steffi is a 43 year old, presenting for the following health issues:  Follow Up and Heartburn (Pt says that she was having an issue with heartburn since November 2023. For the past three weeks the heartburn has improved, however, she is wondering if the heartburn is associated with the Wegovy. )        1/19/2024     2:19 PM   Additional Questions   Roomed by Doni DIAMOND   Steffi is a 43 year old presenting to clinic today for heartburn related to Wegovy. Patient started Wegovy back in July 2023 but was off medication for 3-4 months due to lack of insurance coverage. Patient restarted Wegovy on 11/22/23 at past dose of 1.7 mg weekly. Reports nausea, vomiting, diarrhea, heartburn and lack of appetite. Reports heartburn could be related to Wegovy and frequent vomiting. Symptoms have now resolved. Therapeutic treatment included OTC Tums with moderate relief. Reports she has not needed to take Tums the past few weeks due to no symptoms. Reports she is down 40lbs from original weight before Wegovy. Exercise includes walking. She is eating healthy and using food tracking [employer]. Reports 2-3 servings of fruits/veggies daily. Cutting back on alcohol.       Objective    /88 (BP Location: Left arm, Patient Position: Sitting, Cuff Size: Adult Large)   Pulse 86   Ht 1.651 m (5' 5\")   Wt 106.4 kg (234 lb 9.6 oz)   SpO2 98%   BMI 39.04 kg/m    Body mass index is 39.04 kg/m .    Physical Exam   GENERAL: alert and no distress  RESP: lungs clear to auscultation - no rales, rhonchi or wheezes  CV: regular rate and rhythm, normal S1 S2, no S3 or S4, no murmur, click or rub, no peripheral edema  ABDOMEN: soft, nontender, no hepatosplenomegaly, no masses and bowel sounds normal  PSYCH: mentation appears normal, affect normal/bright    Aba Escalante, MICHAEL student      I saw and examined this patient with the NP student and agree with the student's findings and plan of care as " documented in the student's note with the following modifications: none    Signed Electronically by: LM Farr CNP

## 2024-01-24 ENCOUNTER — LAB (OUTPATIENT)
Dept: LAB | Facility: CLINIC | Age: 44
End: 2024-01-24
Payer: COMMERCIAL

## 2024-01-24 DIAGNOSIS — E78.5 HYPERLIPIDEMIA LDL GOAL <100: ICD-10-CM

## 2024-01-24 DIAGNOSIS — R73.03 PREDIABETES: ICD-10-CM

## 2024-01-24 DIAGNOSIS — K76.0 NAFLD (NONALCOHOLIC FATTY LIVER DISEASE): ICD-10-CM

## 2024-01-24 LAB
ALBUMIN SERPL BCG-MCNC: 4.3 G/DL (ref 3.5–5.2)
ALP SERPL-CCNC: 78 U/L (ref 40–150)
ALT SERPL W P-5'-P-CCNC: 16 U/L (ref 0–50)
ANION GAP SERPL CALCULATED.3IONS-SCNC: 10 MMOL/L (ref 7–15)
AST SERPL W P-5'-P-CCNC: 24 U/L (ref 0–45)
BILIRUB SERPL-MCNC: 0.4 MG/DL
BUN SERPL-MCNC: 9.3 MG/DL (ref 6–20)
CALCIUM SERPL-MCNC: 9.2 MG/DL (ref 8.6–10)
CHLORIDE SERPL-SCNC: 103 MMOL/L (ref 98–107)
CHOLEST SERPL-MCNC: 143 MG/DL
CREAT SERPL-MCNC: 0.78 MG/DL (ref 0.51–0.95)
DEPRECATED HCO3 PLAS-SCNC: 22 MMOL/L (ref 22–29)
EGFRCR SERPLBLD CKD-EPI 2021: >90 ML/MIN/1.73M2
FASTING STATUS PATIENT QL REPORTED: YES
GLUCOSE SERPL-MCNC: 104 MG/DL (ref 70–99)
HBA1C MFR BLD: 5 %
HDLC SERPL-MCNC: 71 MG/DL
LDLC SERPL CALC-MCNC: 59 MG/DL
NONHDLC SERPL-MCNC: 72 MG/DL
POTASSIUM SERPL-SCNC: 3.9 MMOL/L (ref 3.4–5.3)
PROT SERPL-MCNC: 7.5 G/DL (ref 6.4–8.3)
SODIUM SERPL-SCNC: 135 MMOL/L (ref 135–145)
TRIGL SERPL-MCNC: 66 MG/DL

## 2024-01-24 PROCEDURE — 82465 ASSAY BLD/SERUM CHOLESTEROL: CPT

## 2024-01-24 PROCEDURE — 36415 COLL VENOUS BLD VENIPUNCTURE: CPT

## 2024-01-24 PROCEDURE — 80053 COMPREHEN METABOLIC PANEL: CPT

## 2024-01-24 PROCEDURE — 83718 ASSAY OF LIPOPROTEIN: CPT

## 2024-01-24 PROCEDURE — 83036 HEMOGLOBIN GLYCOSYLATED A1C: CPT

## 2024-03-28 DIAGNOSIS — J30.89 CHRONIC NON-SEASONAL ALLERGIC RHINITIS: ICD-10-CM

## 2024-04-01 ENCOUNTER — MYC MEDICAL ADVICE (OUTPATIENT)
Dept: INTERNAL MEDICINE | Facility: CLINIC | Age: 44
End: 2024-04-01
Payer: COMMERCIAL

## 2024-04-01 DIAGNOSIS — E66.813 CLASS 3 SEVERE OBESITY DUE TO EXCESS CALORIES WITH BODY MASS INDEX (BMI) OF 45.0 TO 49.9 IN ADULT, UNSPECIFIED WHETHER SERIOUS COMORBIDITY PRESENT (H): Primary | ICD-10-CM

## 2024-04-01 DIAGNOSIS — E66.01 CLASS 3 SEVERE OBESITY DUE TO EXCESS CALORIES WITH BODY MASS INDEX (BMI) OF 45.0 TO 49.9 IN ADULT, UNSPECIFIED WHETHER SERIOUS COMORBIDITY PRESENT (H): Primary | ICD-10-CM

## 2024-04-02 RX ORDER — MONTELUKAST SODIUM 10 MG/1
1 TABLET ORAL AT BEDTIME
Qty: 90 TABLET | Refills: 2 | Status: SHIPPED | OUTPATIENT
Start: 2024-04-02

## 2024-04-02 NOTE — TELEPHONE ENCOUNTER
montelukast (SINGULAIR) 10 MG tablet 90 tablet 3 5/4/2023  ----------------------  Last Office Visit : 1/19/2024  [facility] Internal Medicine Corina Maddox APRN CNP     Future Office visit:  0  ----------------------    Refill decision: #90 + 2 refills          Pass/Fail Protocol Criteria:    Leukotriene Inhibitors Protocol [id number]/2024 01:51 PM   Protocol Details Patient is age 12 or older    Medication is active on med list

## 2024-04-22 ENCOUNTER — MYC REFILL (OUTPATIENT)
Dept: INTERNAL MEDICINE | Facility: CLINIC | Age: 44
End: 2024-04-22
Payer: COMMERCIAL

## 2024-04-22 DIAGNOSIS — J30.89 CHRONIC NON-SEASONAL ALLERGIC RHINITIS: ICD-10-CM

## 2024-04-22 RX ORDER — MONTELUKAST SODIUM 10 MG/1
1 TABLET ORAL AT BEDTIME
Qty: 90 TABLET | Refills: 2 | Status: CANCELLED | OUTPATIENT
Start: 2024-04-22

## 2024-04-22 NOTE — TELEPHONE ENCOUNTER
montelukast (SINGULAIR) 10 MG tablet: refills on file  - I called [facility] and confirmed refills are available  - message sent to patient

## 2024-06-26 DIAGNOSIS — K76.0 NAFLD (NONALCOHOLIC FATTY LIVER DISEASE): ICD-10-CM

## 2024-06-26 DIAGNOSIS — E78.5 HYPERLIPIDEMIA LDL GOAL <100: ICD-10-CM

## 2024-06-28 ENCOUNTER — MYC REFILL (OUTPATIENT)
Dept: INTERNAL MEDICINE | Facility: CLINIC | Age: 44
End: 2024-06-28
Payer: COMMERCIAL

## 2024-06-28 DIAGNOSIS — K76.0 NAFLD (NONALCOHOLIC FATTY LIVER DISEASE): ICD-10-CM

## 2024-06-28 DIAGNOSIS — E78.5 HYPERLIPIDEMIA LDL GOAL <100: ICD-10-CM

## 2024-06-28 RX ORDER — ATORVASTATIN CALCIUM 10 MG/1
10 TABLET, FILM COATED ORAL DAILY
Qty: 90 TABLET | Refills: 3 | Status: CANCELLED | OUTPATIENT
Start: 2024-06-28

## 2024-07-01 RX ORDER — ATORVASTATIN CALCIUM 10 MG/1
10 TABLET, FILM COATED ORAL DAILY
Qty: 90 TABLET | Refills: 1 | Status: SHIPPED | OUTPATIENT
Start: 2024-07-01

## 2024-07-01 ASSESSMENT — PATIENT HEALTH QUESTIONNAIRE - PHQ9
SUM OF ALL RESPONSES TO PHQ QUESTIONS 1-9: 2
10. IF YOU CHECKED OFF ANY PROBLEMS, HOW DIFFICULT HAVE THESE PROBLEMS MADE IT FOR YOU TO DO YOUR WORK, TAKE CARE OF THINGS AT HOME, OR GET ALONG WITH OTHER PEOPLE: NOT DIFFICULT AT ALL
SUM OF ALL RESPONSES TO PHQ QUESTIONS 1-9: 2

## 2024-07-01 NOTE — TELEPHONE ENCOUNTER
Last Clinic Visit: 1/19/2024 [facility] Internal Medicine [place]     atorvastatin (LIPITOR) 10 MG tablet: passed medication protocol  - refills sent       LDL Cholesterol Calculated   Date Value Ref Range Status   01/24/2024 59 <=100 mg/dL Final   04/08/2021 110 (H) <100 mg/dL Final     Comment:     Above desirable:  100-129 mg/dl  Borderline High:  130-159 mg/dL  High:             160-189 mg/dL  Very high:       >189 mg/dl

## 2024-07-01 NOTE — TELEPHONE ENCOUNTER
Last Clinic Visit: 1/19/2024 [facility] Internal Medicine [place]     atorvastatin (LIPITOR) 10 MG tablet: addressed in other encounter  - refills sent  - message to patient

## 2024-07-02 ENCOUNTER — OFFICE VISIT (OUTPATIENT)
Dept: INTERNAL MEDICINE | Facility: CLINIC | Age: 44
End: 2024-07-02
Payer: COMMERCIAL

## 2024-07-02 VITALS
OXYGEN SATURATION: 95 % | HEIGHT: 65 IN | DIASTOLIC BLOOD PRESSURE: 88 MMHG | HEART RATE: 68 BPM | WEIGHT: 230.5 LBS | BODY MASS INDEX: 38.4 KG/M2 | SYSTOLIC BLOOD PRESSURE: 125 MMHG

## 2024-07-02 DIAGNOSIS — Z11.4 SCREENING FOR HIV (HUMAN IMMUNODEFICIENCY VIRUS): Primary | ICD-10-CM

## 2024-07-02 PROCEDURE — 99396 PREV VISIT EST AGE 40-64: CPT | Performed by: NURSE PRACTITIONER

## 2024-07-02 RX ORDER — AZITHROMYCIN 500 MG/1
TABLET, FILM COATED ORAL
Qty: 3 TABLET | Refills: 0 | Status: SHIPPED | OUTPATIENT
Start: 2024-07-02

## 2024-07-02 RX ORDER — ATOVAQUONE AND PROGUANIL HYDROCHLORIDE 250; 100 MG/1; MG/1
1 TABLET, FILM COATED ORAL DAILY
Qty: 14 TABLET | Refills: 0 | Status: SHIPPED | OUTPATIENT
Start: 2024-07-02

## 2024-07-02 NOTE — PATIENT INSTRUCTIONS
Creating a Medical Travel Kit    Every busy traveler should have a kit containing personal care and medical items that can be easily tossed into a suitcase.  Quantities can be adjusted for the length of travel, for destinations, and availability of replacement at your destination.    Plan to see your Travel medicine Clinic 4-6 weeks prior to departure as some vaccines require time to become effective.     Items to Pack:    -Basic antibiotics  -Mild laxatives: Metamucil or Senokot, available without a prescription.  -Anti-diarrheal medication: Imodium AD or Pepto Bismol, available in tablet form without a prescription.  -Medical thermometer  -Antacid tablets  -Band aides, guaze bandages, ace wrap, adhesive tape, antibiotic ointment  -Topical ointment or creams for rash or bites (over the counter hydrocortisone cream).    -Motion sickness: Dramamine or Antivert (Meclazine)tablets  -Aspirin, acetaminophen(tylenol) ibuprofen  -Cough medicine or drops  -Anti-fungal foot powder  -Nasal spray or decongestants  -Eyeglasses.  Take an extra pair or copy of your prescription.  _Insect repellent with 30% DEET time release, such as Ultrathon or Pederson products. Available at [employer]  -Permethrin clothing insecticide treatment.Available at [employer]  -Ear Plugs  -Sunscreen  -Sunglasses, Hat  -Safety pins, toilet paper, money belt, padlock, duct tape    Regular Medications should be left in original containers and carried in your carry on bag. Take enough with you for the entire trip. You might need a letter to give to your insurance to obtain a sufficient amount for your trip.      Carry with you the following info: Known allergies, medical conditions, medications, name of your insurance company and policy number, blood type if known, and phone numbers for emergency contact.            [employer] resources  Medical Alert [facility]  [phone]  Provides Medical  Alert Tags    Passport Emergency Services [phone] (9 am TO 5 pm EST)  [phone] (after hours)  Deals with emergencies relating to passport problems.    [facility] Department [facility]  [phone]( Mon-Fri 8:30-10pm EST)  [phone] Emergencies only, 24 hour facilitator)  [phone] ( travel advisories recording) Provides information on current health conditions around the world.

## 2024-07-02 NOTE — PROGRESS NOTES
Assessment & Plan     Screening for HIV (human immunodeficiency virus)  - azithromycin (ZITHROMAX) 500 MG tablet; 1 tab daily until diarrhea resolves.  - atovaquone-proguanil (MALARONE) 250-100 MG tablet; Take 1 tablet by mouth daily Start 2 days before travel and continue 7 days after return.  - typhoid (VIVOTIF) CR capsule; Take 1 capsule by mouth every other day for 4 doses One capsule on alternate days (day 1, 3, 5, and 7) for a total of 4 doses; all doses should be completed at least 1 week prior to potential exposure.  She was given written information regarding travel to [place] from the [employer] website as well as a list of items to consider packing.  We discussed malaria and daytime mosquito prevention, rabies risks, sun exposure and traveler's diarrhea.        I spent a total of  40   minutes in the care of this pt during today's office visit. This time includes reviewing the patient's chart and prior history, obtaining a history, performing an examination and evaluation and counseling the patient. This time also includes ordering medications or tests necessary in addition to communication to other member's of the patient's health care team. Time spent in documentation and care coordination is included.     Hitesh Kapadia is a 43 year old, presenting for the following health issues:  Consult (Traveling to [place] October 9th, layover in [facility], going to be in [place] [facility])      7/2/2024     9:16 AM   Additional Questions   Roomed by [facility]     Patient Active Problem List   Diagnosis    Fatigue    Heavy menses    Memory difficulty    Allergic rhinosinusitis    CARDIOVASCULAR SCREENING; LDL GOAL LESS THAN 160    Acne    Acute maxillary sinusitis    Vitamin B12 deficiency without anemia    Vitamin D deficiency    Ascorbic acid deficiency    Iron deficiency    Moderate major depression (H)    Hirsutism    Pyridoxine deficiency    Dysmenorrhea    Family history of diabetes mellitus  "   Major depressive disorder, recurrent episode, moderate (HCC)    Morbid obesity, unspecified obesity type (H)    Chronic fatigue    Menorrhagia with regular cycle    Allergic rhinitis, unspecified allergic rhinitis type    LANDRY (obstructive sleep apnea)    Morbid obesity due to excess calories (H)    Sleep apnea, obstructive    Deviated nasal septum    Encounter for long-term (current) use of high-risk medication    Obesity    Obstructive sleep apnea    Nasal obstruction    Cervical high risk HPV (human papillomavirus) test positive         Objective    /88 (BP Location: Right arm, Patient Position: Sitting, Cuff Size: Adult Large)   Pulse 68   Ht 1.651 m (5' 5\")   Wt 104.6 kg (230 lb 8 oz)   SpO2 95%   BMI 38.36 kg/m    Body mass index is 38.36 kg/m .  Physical Exam   GENERAL: alert and no distress  MS: no gross musculoskeletal defects noted, no edema  PSYCH: mentation appears normal, affect normal/bright            Signed Electronically by: LM Scruggs CNP    Answers submitted by the patient for this visit:  Patient Health Questionnaire (Submitted on 7/1/2024)  If you checked off any problems, how difficult have these problems made it for you to do your work, take care of things at home, or get along with other people?: Not difficult at all  PHQ9 TOTAL SCORE: 2  General Questionnaire (Submitted on 6/28/2024)  Chief Complaint: Chronic problems general questions HPI Form  What is the reason for your visit today? : Preparing to travel to [place] in October  How many servings of fruits and vegetables do you eat daily?: 0-1  On average, how many sweetened beverages do you drink each day (Examples: soda, juice, sweet tea, etc.  Do NOT count diet or artificially sweetened beverages)?: 0  How many minutes a day do you exercise enough to make your heart beat faster?: 20 to 29  How many days a week do you exercise enough to make your heart beat faster?: 5  How many days per week do you miss " taking your medication?: 0

## 2024-07-17 DIAGNOSIS — F33.1 MAJOR DEPRESSIVE DISORDER, RECURRENT EPISODE, MODERATE (H): Primary | ICD-10-CM

## 2024-07-19 RX ORDER — BUPROPION HYDROCHLORIDE 150 MG/1
150 TABLET ORAL EVERY MORNING
Qty: 90 TABLET | Refills: 1 | Status: SHIPPED | OUTPATIENT
Start: 2024-07-19

## 2024-07-19 NOTE — TELEPHONE ENCOUNTER
Last Clinic Visit: [facility] Internal Medicine  7/2/24  Bp reviewed at 7/2/24 clinic visit

## 2024-09-30 DIAGNOSIS — F33.1 MAJOR DEPRESSIVE DISORDER, RECURRENT EPISODE, MODERATE (H): ICD-10-CM

## 2024-10-02 ENCOUNTER — MYC REFILL (OUTPATIENT)
Dept: INTERNAL MEDICINE | Facility: CLINIC | Age: 44
End: 2024-10-02
Payer: COMMERCIAL

## 2024-10-02 DIAGNOSIS — F33.1 MAJOR DEPRESSIVE DISORDER, RECURRENT EPISODE, MODERATE (H): ICD-10-CM

## 2024-10-03 RX ORDER — SERTRALINE HYDROCHLORIDE 100 MG/1
100 TABLET, FILM COATED ORAL DAILY
Qty: 90 TABLET | Refills: 0 | Status: SHIPPED | OUTPATIENT
Start: 2024-10-03

## 2024-10-03 NOTE — TELEPHONE ENCOUNTER
sertraline (ZOLOFT) 100 MG tablet   90 tablet 3 7/12/2023       Last Office Visit : 7-2-2024  Future Office visit:  none  SSRIs Protocol Passed

## 2024-10-04 RX ORDER — SERTRALINE HYDROCHLORIDE 100 MG/1
100 TABLET, FILM COATED ORAL DAILY
Qty: 90 TABLET | Refills: 3 | OUTPATIENT
Start: 2024-10-04

## 2024-10-04 NOTE — TELEPHONE ENCOUNTER
Disp Refills Start End BRET   sertraline (ZOLOFT) 100 MG tablet 90 tablet 0 10/3/2024 -- No   Sig - Route: Take 1 tablet (100 mg) by mouth daily. - Oral     [id number] - [place], [place] - [address] [place] [address] [address]

## 2024-10-29 ENCOUNTER — HOSPITAL ENCOUNTER (OUTPATIENT)
Dept: MAMMOGRAPHY | Facility: CLINIC | Age: 44
Discharge: HOME | End: 2024-10-29
Attending: NURSE PRACTITIONER | Admitting: NURSE PRACTITIONER
Payer: COMMERCIAL

## 2024-10-29 DIAGNOSIS — Z12.31 VISIT FOR SCREENING MAMMOGRAM: ICD-10-CM

## 2024-10-29 PROCEDURE — 77063 BREAST TOMOSYNTHESIS BI: CPT

## 2024-10-30 ASSESSMENT — ANXIETY QUESTIONNAIRES: GAD7 TOTAL SCORE: 3

## 2024-10-31 ENCOUNTER — MYC REFILL (OUTPATIENT)
Dept: INTERNAL MEDICINE | Facility: CLINIC | Age: 44
End: 2024-10-31
Payer: COMMERCIAL

## 2024-10-31 DIAGNOSIS — E66.01 CLASS 3 SEVERE OBESITY DUE TO EXCESS CALORIES WITH BODY MASS INDEX (BMI) OF 45.0 TO 49.9 IN ADULT, UNSPECIFIED WHETHER SERIOUS COMORBIDITY PRESENT (H): ICD-10-CM

## 2024-10-31 DIAGNOSIS — E66.813 CLASS 3 SEVERE OBESITY DUE TO EXCESS CALORIES WITH BODY MASS INDEX (BMI) OF 45.0 TO 49.9 IN ADULT, UNSPECIFIED WHETHER SERIOUS COMORBIDITY PRESENT (H): ICD-10-CM

## 2024-12-28 DIAGNOSIS — E78.5 HYPERLIPIDEMIA LDL GOAL <100: ICD-10-CM

## 2024-12-28 DIAGNOSIS — F33.1 MAJOR DEPRESSIVE DISORDER, RECURRENT EPISODE, MODERATE (H): ICD-10-CM

## 2024-12-28 DIAGNOSIS — K76.0 NAFLD (NONALCOHOLIC FATTY LIVER DISEASE): ICD-10-CM

## 2025-01-02 RX ORDER — ATORVASTATIN CALCIUM 10 MG/1
10 TABLET, FILM COATED ORAL DAILY
Qty: 90 TABLET | Refills: 1 | Status: SHIPPED | OUTPATIENT
Start: 2025-01-02

## 2025-01-02 RX ORDER — SERTRALINE HYDROCHLORIDE 100 MG/1
100 TABLET, FILM COATED ORAL DAILY
Qty: 90 TABLET | Refills: 1 | Status: SHIPPED | OUTPATIENT
Start: 2025-01-02

## 2025-01-02 NOTE — TELEPHONE ENCOUNTER
LVD:  7/2/2024  [facility] Internal Medicine [place]     Rae Salinas, APRN [facility]  Internal Medicine   PHQ9 on 7/2/24=2  Refilled per protocol.  LDL Cholesterol Calculated   Date Value Ref Range Status   01/24/2024 59 <=100 mg/dL Final   04/08/2021 110 (H) <100 mg/dL Final     Comment:     Above desirable:  100-129 mg/dl  Borderline High:  130-159 mg/dL  High:             160-189 mg/dL  Very high:       >189 mg/dl          Discharged

## 2025-01-23 DIAGNOSIS — J30.89 CHRONIC NON-SEASONAL ALLERGIC RHINITIS: ICD-10-CM

## 2025-01-23 DIAGNOSIS — F33.1 MAJOR DEPRESSIVE DISORDER, RECURRENT EPISODE, MODERATE (H): ICD-10-CM

## 2025-01-29 RX ORDER — MONTELUKAST SODIUM 10 MG/1
1 TABLET ORAL AT BEDTIME
Qty: 90 TABLET | Refills: 0 | Status: SHIPPED | OUTPATIENT
Start: 2025-01-29

## 2025-01-29 RX ORDER — BUPROPION HYDROCHLORIDE 150 MG/1
150 TABLET ORAL EVERY MORNING
Qty: 90 TABLET | Refills: 0 | Status: SHIPPED | OUTPATIENT
Start: 2025-01-29

## 2025-01-29 NOTE — TELEPHONE ENCOUNTER
buPROPion (WELLBUTRIN XL) 150 MG 24 hr tablet  150 mg, EVERY MORNING           Summary: Take 1 tablet (150 mg) by mouth every morning, Disp-90 tablet, R-1, E-Prescribe  Dose, Route, Frequency: 150 mg, Oral, EVERY MORNINGStart: 07/19/2024Ord/Sold: 07/19/2024 (O)Ordered On: 07/19/2024Pharmacy: [facility] [id number] [place] [address] WReportDx Associated: Taking: Long-term: Med Note:              Patient Sig: Take 1 tablet (150 mg) by mouth every morning  [facility]ing Department[facility] [place] [facility]  Authorized By: Corina Long APRN CNP  Dispense: 90 tablet  Refills: 1 ordered     Last Office Visit : 7/2/24  Future Office visit:  2/6/25      montelukast (SINGULAIR) 10 MG tablet  1 tablet, AT BEDTIME           Summary: Take 1 tablet (10 mg) by mouth at bedtime, Disp-90 tablet, R-2, E-Prescribe To keep on file until until next fill needed.   Dose, Route, Frequency: 1 tablet, Oral, AT BEDTIMEStart: 04/02/2024Ord/Sold: 04/02/2024 (O)Ordered On: 04/02/2024Pharmacy: [facility] [id number] [place] [id number] WReportDx Associated: Taking: Long-term: Med Note:              Patient Sig: Take 1 tablet (10 mg) by mouth at bedtime  Ordering Department: [facility] [place] [facility]  Authorized By: Corina Long APRN CNP  Dispense: 90 tablet  Refills: 2 ordered       Refilled per protocol    Adrienne RAJAN RN  P Central Nursing/Red Flag Triage & Med Refill Team

## 2025-02-02 NOTE — PROGRESS NOTES
Gynecology Visit Note  2/6/25    Reason for visit: Annual GYN exam    HPI: Patient is a 45 yo P0 who presents today for annual GYN exam.  Last seen in 1/2024 and at that time noted significant time of amenorrhea since 6/2022, unsure if related to menopause. FSH at that time 18.3, Estradiol 221.      Today, doing well.  Has lost 40 pounds with Wegovy and happy about this change.  Continues to enjoy her job with [employer], recently got a promotion so that was exciting.  Tim is doing well, still at [employer] and same hour in [place].  Really overall things have been very good.  She does note she has continue to not have any bleeding.  Has not taken any progesterone.  Occasional night sweat.  Maybe some hot flashes/temperature intolerance but nothing concerning.  No sleep disturbances or dyspareunia.  Denies any pelvic pain or concerning discharge.  No breast changes or worry.  Occasional stress urinary incontinence.  Open to fasting labs while here today.    Past OB/GYN History:  Nulligravid, no desires for future childbearing  Menses: As per HPI today  No STI history  History of female and male partners, currently with male partner Tim, live in [place] together in their house  Contraception: Vasectomy  Pap smear history:   10/2018 had NILM, other HR HPV positive pap  10/2019 had NILM, other HR HPV positive pap  2/2020: Colposcopy negative for dysplasia on cervical biopsy and ECC  4/2021: NILM, Negative HPV pap  1/2024: NILM, HPV Negative pap, plan next cotesting 1/2027  No concerning discharge, No dyspareunia  STI Screening offered today and patient declines    Past Medical History:   Diagnosis Date    Abnormal Pap smear     Don't remember when it was and follow up clear    Abnormal Pap smear of cervix 2019    Allergic rhinitis, cause unspecified     Depressive disorder 2008    Migraines 1998    Obesity     Obstructive sleep apnea     Other acne     Polycystic ovary syndrome 2013    Sleep apnea      No treatment at this time.    Uncomplicated asthma     Borderline - exercise induced    Unspecified urinary incontinence     Varicella 1985      Past Surgical History:   Procedure Laterality Date    BIOPSY  2011    Lump in breast - diagnosis was fatty tumor    GENITOURINARY SURGERY  1986    Urethrotomy    SEPTOPLASTY, TURBINOPLASTY, COMBINED N/A 5/18/2016    Procedure: COMBINED SEPTOPLASTY, TURBINOPLASTY;  Surgeon: Baldev Perez MD;  Location: [facility] OR    SEPTOPLASTY, TURBINOPLASTY, COMBINED N/A 1/2/2023    Procedure: SEPTOPLASTY, NOSE bilateral Turbinate Reduction, bilateral;  Surgeon: Krishan Cheung MD;  [place] [facility] [place]    [facility] NONSPECIFIC PROCEDURE      Urology surgery for night time bed wetting at age 6.      Current Outpatient Medications   Medication Sig Dispense Refill    albuterol (PROAIR HFA/PROVENTIL HFA/VENTOLIN HFA) 108 (90 Base) MCG/ACT inhaler Inhale 2 puffs into the lungs every 6 hours 8.5 g 2    atorvastatin (LIPITOR) 10 MG tablet TAKE 1 TABLET (10 MG) BY MOUTH DAILY. 90 tablet 1    buPROPion (WELLBUTRIN XL) 150 MG 24 hr tablet TAKE 1 TABLET BY MOUTH EVERY MORNING 90 tablet 0    cetirizine (ZYRTEC) 10 MG tablet Take 10 mg by mouth daily      montelukast (SINGULAIR) 10 MG tablet TAKE 1 TABLET BY MOUTH EVERYDAY AT BEDTIME 90 tablet 0    Semaglutide-Weight Management (WEGOVY) 2.4 MG/0.75ML pen Inject 2.4 mg subcutaneously once a week. Patient must have follow-up appointment before further refills can be given. 6 mL 2    sertraline (ZOLOFT) 100 MG tablet TAKE 1 TABLET BY MOUTH EVERY DAY 90 tablet 1    traZODone (DESYREL) 50 MG tablet Take 1 tablet (50 mg) by mouth At Bedtime (Patient taking differently: Take 50 mg by mouth nightly as needed.) 30 tablet 3    VITAMIN D PO Take by mouth.       No current facility-administered medications for this visit.      Allergies   Allergen Reactions    Animal Dander     Cats     Dogs     Rabbit Protein     Seasonal Allergies       Social History     Tobacco Use     "Smoking status: Never    Smokeless tobacco: Never   Substance Use Topics    Alcohol use: Yes     Comment: 6 drinks weekly    Drug use: No      Family History   Problem Relation Age of Onset    Cerebrovascular Disease Father         x2    Hypertension Father     Seizure Disorder Father     Substance Abuse Father     Diabetes Paternal Grandfather     Cancer Paternal Grandfather           from throat cancer. Also had melanoma.    Blood Disease Paternal Grandfather         B12 DEFICIENCY - WAS ON B12 SHOTS    Substance Abuse Paternal Grandfather     Obesity Paternal Grandfather     Cancer Maternal Grandmother          of colon cancer    Colon Cancer Maternal Grandmother     Substance Abuse Maternal Grandmother     Cerebrovascular Disease Maternal Grandfather     Heart Disease Maternal Grandfather              Substance Abuse Maternal Grandfather     Neurologic Disorder Sister         Epilepsy diagnosed     Neurologic Disorder Sister         Epilepsy diagnosed     Mental Illness Sister     Substance Abuse Sister     Substance Abuse Paternal Grandmother     Melanoma No family hx of     Skin Cancer No family hx of     Liver Disease No family hx of       ROS: A complete 10 point ROS was conducted and was negative aside from that noted in the HPI    O:  /88   Pulse 79   Ht 1.651 m (5' 5\")   Wt 109.2 kg (240 lb 11.2 oz)   LMP  (LMP Unknown)   BMI 40.05 kg/m     General: NAD, A&Ox3  Breasts: Symmetrical, No nipple discharge, nodules or skin changes bilaterally.  No axillary lymphadenopathy.    Abdomen: Soft, NT, ND  Genitourinary:    External Genitalia:  General appearance; normal, Hair distribution; normal, Lesions absent  Urethral Meatus:  Size normal, Location normal, Lesions absent  Urethra:  Fullness absent, Masses absent  Bladder:  Fullness absent, Masses absent, Tenderness absent  Vagina:  General appearance normal, Estrogen effect normal, Discharge absent, Lesions " absent  Cervix:  General appearance normal, Lesions absent, Discharge absent, Tenderness absent, Enlargement absent  Uterus:  Size normal, Masses absent, Tenderness absent  Adenexa:  No masses appreciated  Anus/Perineum:  Lesions absent     A/P: 45 yo P0 presents for annual GYN exam  1) Normal breast and pelvic exam  2) Screening for malignant neoplasm of cervix: Next cotesting 1/2027  3) Screening for breast cancer: UTD, Mammogram 10/2024 BIRADS-1  4) Amenorrhea: Has had no bleeding since last GYN exam, has not taken progesterone.  Will get FSH/Estradiol to see if any changes compared to last year.  5) Mood: On Zoloft 100 mg daily, feel this is ok, anything else is situational and related to current state of affairs.  6) Diabetes and Lipid screening: Hgba1c and Lipid panel today.  On Lipitor and will check LFTs today as well.  7) RTC in 1 year for annual, earlier with any concerns    Anisha Diana MD

## 2025-02-06 ENCOUNTER — OFFICE VISIT (OUTPATIENT)
Dept: OBGYN | Facility: CLINIC | Age: 45
End: 2025-02-06
Attending: OBSTETRICS & GYNECOLOGY
Payer: COMMERCIAL

## 2025-02-06 ENCOUNTER — LAB (OUTPATIENT)
Dept: LAB | Facility: CLINIC | Age: 45
End: 2025-02-06
Attending: OBSTETRICS & GYNECOLOGY
Payer: COMMERCIAL

## 2025-02-06 VITALS
SYSTOLIC BLOOD PRESSURE: 131 MMHG | WEIGHT: 240.7 LBS | BODY MASS INDEX: 40.1 KG/M2 | DIASTOLIC BLOOD PRESSURE: 88 MMHG | HEIGHT: 65 IN | HEART RATE: 79 BPM

## 2025-02-06 DIAGNOSIS — F33.1 MAJOR DEPRESSIVE DISORDER, RECURRENT EPISODE, MODERATE (H): ICD-10-CM

## 2025-02-06 DIAGNOSIS — Z13.1 SCREENING FOR DIABETES MELLITUS: ICD-10-CM

## 2025-02-06 DIAGNOSIS — N91.2 AMENORRHEA: ICD-10-CM

## 2025-02-06 DIAGNOSIS — Z01.419 ENCOUNTER FOR GYNECOLOGICAL EXAMINATION WITHOUT ABNORMAL FINDING: Primary | ICD-10-CM

## 2025-02-06 DIAGNOSIS — E78.00 HIGH CHOLESTEROL: ICD-10-CM

## 2025-02-06 LAB
ALBUMIN SERPL BCG-MCNC: 4.4 G/DL (ref 3.5–5.2)
ALP SERPL-CCNC: 72 U/L (ref 40–150)
ALT SERPL W P-5'-P-CCNC: 17 U/L (ref 0–50)
ANION GAP SERPL CALCULATED.3IONS-SCNC: 11 MMOL/L (ref 7–15)
AST SERPL W P-5'-P-CCNC: 22 U/L (ref 0–45)
BILIRUB SERPL-MCNC: 0.5 MG/DL
BUN SERPL-MCNC: 17.4 MG/DL (ref 6–20)
CALCIUM SERPL-MCNC: 9.1 MG/DL (ref 8.8–10.4)
CHLORIDE SERPL-SCNC: 103 MMOL/L (ref 98–107)
CHOLEST SERPL-MCNC: 158 MG/DL
CREAT SERPL-MCNC: 0.85 MG/DL (ref 0.51–0.95)
EGFRCR SERPLBLD CKD-EPI 2021: 87 ML/MIN/1.73M2
EST. AVERAGE GLUCOSE BLD GHB EST-MCNC: 108 MG/DL
ESTRADIOL SERPL-MCNC: 69 PG/ML
FASTING STATUS PATIENT QL REPORTED: YES
FASTING STATUS PATIENT QL REPORTED: YES
FSH SERPL IRP2-ACNC: 16.2 MIU/ML
GLUCOSE SERPL-MCNC: 92 MG/DL (ref 70–99)
HBA1C MFR BLD: 5.4 %
HCO3 SERPL-SCNC: 23 MMOL/L (ref 22–29)
HDLC SERPL-MCNC: 81 MG/DL
LDLC SERPL CALC-MCNC: 63 MG/DL
NONHDLC SERPL-MCNC: 77 MG/DL
POTASSIUM SERPL-SCNC: 4.4 MMOL/L (ref 3.4–5.3)
PROT SERPL-MCNC: 8 G/DL (ref 6.4–8.3)
SODIUM SERPL-SCNC: 137 MMOL/L (ref 135–145)
TRIGL SERPL-MCNC: 70 MG/DL

## 2025-02-06 PROCEDURE — 84520 ASSAY OF UREA NITROGEN: CPT

## 2025-02-06 PROCEDURE — 83036 HEMOGLOBIN GLYCOSYLATED A1C: CPT

## 2025-02-06 PROCEDURE — 36415 COLL VENOUS BLD VENIPUNCTURE: CPT

## 2025-02-06 PROCEDURE — 99213 OFFICE O/P EST LOW 20 MIN: CPT | Performed by: OBSTETRICS & GYNECOLOGY

## 2025-02-06 PROCEDURE — 82670 ASSAY OF TOTAL ESTRADIOL: CPT

## 2025-02-06 PROCEDURE — 84075 ASSAY ALKALINE PHOSPHATASE: CPT

## 2025-02-06 PROCEDURE — 83001 ASSAY OF GONADOTROPIN (FSH): CPT

## 2025-02-06 PROCEDURE — 80061 LIPID PANEL: CPT

## 2025-02-06 PROCEDURE — 82465 ASSAY BLD/SERUM CHOLESTEROL: CPT

## 2025-02-06 ASSESSMENT — ANXIETY QUESTIONNAIRES
3. WORRYING TOO MUCH ABOUT DIFFERENT THINGS: NOT AT ALL
6. BECOMING EASILY ANNOYED OR IRRITABLE: SEVERAL DAYS
GAD7 TOTAL SCORE: 2
8. IF YOU CHECKED OFF ANY PROBLEMS, HOW DIFFICULT HAVE THESE MADE IT FOR YOU TO DO YOUR WORK, TAKE CARE OF THINGS AT HOME, OR GET ALONG WITH OTHER PEOPLE?: NOT DIFFICULT AT ALL
2. NOT BEING ABLE TO STOP OR CONTROL WORRYING: NOT AT ALL
4. TROUBLE RELAXING: NOT AT ALL
1. FEELING NERVOUS, ANXIOUS, OR ON EDGE: NOT AT ALL
GAD7 TOTAL SCORE: 2
5. BEING SO RESTLESS THAT IT IS HARD TO SIT STILL: NOT AT ALL
7. FEELING AFRAID AS IF SOMETHING AWFUL MIGHT HAPPEN: SEVERAL DAYS
GAD7 TOTAL SCORE: 2
7. FEELING AFRAID AS IF SOMETHING AWFUL MIGHT HAPPEN: SEVERAL DAYS
IF YOU CHECKED OFF ANY PROBLEMS ON THIS QUESTIONNAIRE, HOW DIFFICULT HAVE THESE PROBLEMS MADE IT FOR YOU TO DO YOUR WORK, TAKE CARE OF THINGS AT HOME, OR GET ALONG WITH OTHER PEOPLE: NOT DIFFICULT AT ALL

## 2025-02-06 NOTE — LETTER
2/6/2025       RE: Steffi Hernandez  [address] [facility] [address]     Dear Colleague,    Thank you for referring your patient, Steffi Hernandez, to the [facility] [place] [facility]. Please see a copy of my visit note below.    Gynecology Visit Note  2/6/25    Reason for visit: Annual GYN exam    HPI: Patient is a 43 yo P0 who presents today for annual GYN exam.  Last seen in 1/2024 and at that time noted significant time of amenorrhea since 6/2022, unsure if related to menopause. FSH at that time 18.3, Estradiol 221.      Today, doing well.  Has lost 40 pounds with Wegovy and happy about this change.  Continues to enjoy her job with [employer], recently got a promotion so that was exciting.  Tim is doing well, still at [employer] and same hour in [place].  Really overall things have been very good.  She does note she has continue to not have any bleeding.  Has not taken any progesterone.  Occasional night sweat.  Maybe some hot flashes/temperature intolerance but nothing concerning.  No sleep disturbances or dyspareunia.  Denies any pelvic pain or concerning discharge.  No breast changes or worry.  Occasional stress urinary incontinence.  Open to fasting labs while here today.    Past OB/GYN History:  Nulligravid, no desires for future childbearing  Menses: As per HPI today  No STI history  History of female and male partners, currently with male partner Tim, live in [place] together in their house  Contraception: Vasectomy  Pap smear history:   10/2018 had NILM, other HR HPV positive pap  10/2019 had NILM, other HR HPV positive pap  2/2020: Colposcopy negative for dysplasia on cervical biopsy and ECC  4/2021: NILM, Negative HPV pap  1/2024: NILM, HPV Negative pap, plan next cotesting 1/2027  No concerning discharge, No dyspareunia  STI Screening offered today and patient declines    Past Medical History:    Diagnosis Date     Abnormal Pap smear     Don't remember when it was and follow up clear     Abnormal Pap smear of cervix 2019     Allergic rhinitis, cause unspecified      Depressive disorder 2008     Migraines 1998     Obesity      Obstructive sleep apnea      Other acne      Polycystic ovary syndrome 2013     Sleep apnea     No treatment at this time.     Uncomplicated asthma     Borderline - exercise induced     Unspecified urinary incontinence      Varicella 1985      Past Surgical History:   Procedure Laterality Date     BIOPSY  2011    Lump in breast - diagnosis was fatty tumor     GENITOURINARY SURGERY  1986    Urethrotomy     SEPTOPLASTY, TURBINOPLASTY, COMBINED N/A 5/18/2016    Procedure: COMBINED SEPTOPLASTY, TURBINOPLASTY;  Surgeon: Baldev Perez MD;  Location:  OR     SEPTOPLASTY, TURBINOPLASTY, COMBINED N/A 1/2/2023    Procedure: SEPTOPLASTY, NOSE bilateral Turbinate Reduction, bilateral;  Surgeon: Krishan Cheung MD;  Location: [facility] [place]     [facility] NONSPECIFIC PROCEDURE      Urology surgery for night time bed wetting at age 6.      Current Outpatient Medications   Medication Sig Dispense Refill     albuterol (PROAIR HFA/PROVENTIL HFA/VENTOLIN HFA) 108 (90 Base) MCG/ACT inhaler Inhale 2 puffs into the lungs every 6 hours 8.5 g 2     atorvastatin (LIPITOR) 10 MG tablet TAKE 1 TABLET (10 MG) BY MOUTH DAILY. 90 tablet 1     buPROPion (WELLBUTRIN XL) 150 MG 24 hr tablet TAKE 1 TABLET BY MOUTH EVERY MORNING 90 tablet 0     cetirizine (ZYRTEC) 10 MG tablet Take 10 mg by mouth daily       montelukast (SINGULAIR) 10 MG tablet TAKE 1 TABLET BY MOUTH EVERYDAY AT BEDTIME 90 tablet 0     Semaglutide-Weight Management (WEGOVY) 2.4 MG/0.75ML pen Inject 2.4 mg subcutaneously once a week. Patient must have follow-up appointment before further refills can be given. 6 mL 2     sertraline (ZOLOFT) 100 MG tablet TAKE 1 TABLET BY MOUTH EVERY DAY 90 tablet 1     traZODone (DESYREL) 50 MG tablet Take 1 tablet (50 mg) by  "mouth At Bedtime (Patient taking differently: Take 50 mg by mouth nightly as needed.) 30 tablet 3     VITAMIN D PO Take by mouth.       No current facility-administered medications for this visit.      Allergies   Allergen Reactions     Animal Dander      Cats      Dogs      Rabbit Protein      Seasonal Allergies       Social History     Tobacco Use     Smoking status: Never     Smokeless tobacco: Never   Substance Use Topics     Alcohol use: Yes     Comment: 6 drinks weekly     Drug use: No      Family History   Problem Relation Age of Onset     Cerebrovascular Disease Father         x2     Hypertension Father      Seizure Disorder Father      Substance Abuse Father      Diabetes Paternal Grandfather      Cancer Paternal Grandfather           from throat cancer. Also had melanoma.     Blood Disease Paternal Grandfather         B12 DEFICIENCY - WAS ON B12 SHOTS     Substance Abuse Paternal Grandfather      Obesity Paternal Grandfather      Cancer Maternal Grandmother          of colon cancer     Colon Cancer Maternal Grandmother      Substance Abuse Maternal Grandmother      Cerebrovascular Disease Maternal Grandfather      Heart Disease Maternal Grandfather               Substance Abuse Maternal Grandfather      Neurologic Disorder Sister         Epilepsy diagnosed      Neurologic Disorder Sister         Epilepsy diagnosed      Mental Illness Sister      Substance Abuse Sister      Substance Abuse Paternal Grandmother      Melanoma No family hx of      Skin Cancer No family hx of      Liver Disease No family hx of       ROS: A complete 10 point ROS was conducted and was negative aside from that noted in the HPI    O:  /88   Pulse 79   Ht 1.651 m (5' 5\")   Wt 109.2 kg (240 lb 11.2 oz)   LMP  (LMP Unknown)   BMI 40.05 kg/m     General: NAD, A&Ox3  Breasts: Symmetrical, No nipple discharge, nodules or skin changes bilaterally.  No axillary lymphadenopathy.  "   Abdomen: Soft, NT, ND  Genitourinary:    External Genitalia:  General appearance; normal, Hair distribution; normal, Lesions absent  Urethral Meatus:  Size normal, Location normal, Lesions absent  Urethra:  Fullness absent, Masses absent  Bladder:  Fullness absent, Masses absent, Tenderness absent  Vagina:  General appearance normal, Estrogen effect normal, Discharge absent, Lesions absent  Cervix:  General appearance normal, Lesions absent, Discharge absent, Tenderness absent, Enlargement absent  Uterus:  Size normal, Masses absent, Tenderness absent  Adenexa:  No masses appreciated  Anus/Perineum:  Lesions absent     A/P: 45 yo P0 presents for annual GYN exam  1) Normal breast and pelvic exam  2) Screening for malignant neoplasm of cervix: Next cotesting 1/2027  3) Screening for breast cancer: UTD, Mammogram 10/2024 BIRADS-1  4) Amenorrhea: Has had no bleeding since last GYN exam, has not taken progesterone.  Will get FSH/Estradiol to see if any changes compared to last year.  5) Mood: On Zoloft 100 mg daily, feel this is ok, anything else is situational and related to current state of affairs.  6) Diabetes and Lipid screening: Hgba1c and Lipid panel today.  On Lipitor and will check LFTs today as well.  7) RTC in 1 year for annual, earlier with any concerns    Anisha Diana MD      Again, thank you for allowing me to participate in the care of your patient.      Sincerely,    Anisha Diana MD

## 2025-04-26 DIAGNOSIS — J30.89 CHRONIC NON-SEASONAL ALLERGIC RHINITIS: ICD-10-CM

## 2025-04-26 DIAGNOSIS — F33.1 MAJOR DEPRESSIVE DISORDER, RECURRENT EPISODE, MODERATE (H): ICD-10-CM

## 2025-04-28 ENCOUNTER — MYC MEDICAL ADVICE (OUTPATIENT)
Dept: INTERNAL MEDICINE | Facility: CLINIC | Age: 45
End: 2025-04-28
Payer: COMMERCIAL

## 2025-04-28 RX ORDER — MONTELUKAST SODIUM 10 MG/1
1 TABLET ORAL AT BEDTIME
Qty: 90 TABLET | Refills: 0 | Status: SHIPPED | OUTPATIENT
Start: 2025-04-28

## 2025-04-28 RX ORDER — BUPROPION HYDROCHLORIDE 150 MG/1
150 TABLET ORAL EVERY MORNING
Qty: 90 TABLET | Refills: 0 | Status: SHIPPED | OUTPATIENT
Start: 2025-04-28

## 2025-04-28 NOTE — TELEPHONE ENCOUNTER
Disp Refills Start End BRET   montelukast (SINGULAIR) 10 MG tablet 90 tablet 0 4/28/2025 -- No   Sig - Route: Take 1 tablet (10 mg) by mouth at bedtime. - Oral   Sent to pharmacy as: Montelukast Sodium 10 MG Oral Tablet (SINGULAIR)   Class: E-Prescribe   Notes to Pharmacy: Needs annual checkup for additional refills.   Order: [id number]   E-Prescribing Status: Receipt confirmed by pharmacy (4/28/2025 11:11 AM CDT)     Addressed in a different encounter.

## 2025-04-28 NOTE — TELEPHONE ENCOUNTER
Last Written Prescription:  montelukast (SINGULAIR) 10 MG tablet 90 tablet 0 1/29/2025 -- No   Sig - Route: TAKE 1 TABLET BY MOUTH EVERYDAY AT BEDTIME - Oral   Sent to pharmacy as: Montelukast Sodium 10 MG Oral Tablet (SINGULAIR)   Class: E-Prescribe     ----------------------  Last Visit Date: 7/2/24  Future Visit Date: 0  ----------------------      Refill decision: Medication refilled per  Medication Refill in Ambulatory Care  policy.      Request from pharmacy:  Requested Prescriptions   Pending Prescriptions Disp Refills    montelukast (SINGULAIR) 10 MG tablet [Pharmacy Med Name: MONTELUKAST SOD 10 MG TABLET] 90 tablet 0     Sig: TAKE 1 TABLET BY MOUTH EVERYDAY AT BEDTIME       Leukotriene Inhibitors Protocol Passed - 4/28/2025 11:07 AM        Passed - Patient is age 12 or older     If patient is under 16, ok to refill using age based dosing.           Passed - Medication is active on med list and the sig matches. RN to manually verify dose and sig if red X/fail.     If the protocol passes (green check), you do not need to verify med dose and sig.    A prescription matches if they are the same clinical intention.    For Example: once daily and every morning are the same.    The protocol can not identify upper and lower case letters as matching and will fail.     For Example: Take 1 tablet (50 mg) by mouth daily     TAKE 1 TABLET (50 MG) BY MOUTH DAILY    For all fails (red x), verify dose and sig.    If the refill does match what is on file, the RN can still proceed to approve the refill request.       If they do not match, route to the appropriate provider.             Passed - Recent (12 mo) or future (90 days) visit within the authorizing provider's specialty     The patient must have completed an in-person or virtual visit within the past 12 months or has a future visit scheduled within the next 90 days with the authorizing provider s specialty.  Urgent care and e-visits do not qualify as an office visit  for this protocol.          Passed - Medication indicated for associated diagnosis     Medication is associated with one or more of the following diagnoses:   Allergies   Asthma   Atopic Dermatitis   Nasal Congestion   Nasal discharge   Rhinitis   Urticaria, chronic   Cystic Fibrosis  Bronchiectasis                           Last Written Prescription:  buPROPion (WELLBUTRIN XL) 150 MG 24 hr tablet 90 tablet 0 1/29/2025 -- No   Sig - Route: TAKE 1 TABLET BY MOUTH EVERY MORNING - Oral   Sent to pharmacy as: buPROPion HCl ER (XL) 150 MG Oral Tablet Extended Release 24 Hour (WELLBUTRIN XL)   Class: E-Prescribe     ----------------------  Last Visit Date: 7/2/24  Future Visit Date: 0  ----------------------    Refill decision: Medication unable to be refilled by RN due to: Overdue labs/test:           buPROPion (WELLBUTRIN XL) 150 MG 24 hr tablet [Pharmacy Med Name: BUPROPION HCL  MG TABLET] 90 tablet 0     Sig: TAKE 1 TABLET BY MOUTH EVERY MORNING       Rx Protocol Bupropion Failed - 4/28/2025 11:07 AM        Failed - PHQ-9 score of less than 5 in past 6 months     Please review last PHQ-9 score.       4/5/2023     6:22 PM 1/4/2024    11:02 AM 7/1/2024     9:20 AM   PHQ-9 SCORE   PHQ-9 Total Score MyChart   2 (Minimal depression)   PHQ-9 Total Score 8 3 2             Passed - Medication is active on med list and the sig matches. RN to manually verify dose and sig if red X/fail.     If the protocol passes (green check), you do not need to verify med dose and sig.    A prescription matches if they are the same clinical intention.    For Example: once daily and every morning are the same.    The protocol can not identify upper and lower case letters as matching and will fail.     For Example: Take 1 tablet (50 mg) by mouth daily     TAKE 1 TABLET (50 MG) BY MOUTH DAILY    For all fails (red x), verify dose and sig.    If the refill does match what is on file, the RN can still proceed to approve the refill  request.       If they do not match, route to the appropriate provider.             Passed - Recent (12 mo) or future (90 days) visit within the authorizing provider's specialty     The patient must have completed an in-person or virtual visit within the past 12 months or has a future visit scheduled within the next 90 days with the authorizing provider s specialty.  Urgent care and e-visits do not qualify as an office visit for this protocol.          Passed - Medication is indicated for associated diagnosis     Medication is associated with one or more of the following diagnoses:  Anxiety  Bipolar Disorder  Depression  Smoking Cessation  Adjustment disorder with mixed anxiety and depressed mood  Adjustment disorder with anxiety  Tobacco user  Adjustment disorder with anxious mood  Attention deficit hyperactivity disorder          Passed - Blood pressure on file in the past 12 months        Passed - Patient is age 18 or older        Passed - No active pregnancy on record        Passed - No positive pregnancy test in past 12 months

## 2025-05-19 ENCOUNTER — TRANSFERRED RECORDS (OUTPATIENT)
Dept: HEALTH INFORMATION MANAGEMENT | Facility: CLINIC | Age: 45
End: 2025-05-19
Payer: COMMERCIAL

## 2025-06-12 ENCOUNTER — OFFICE VISIT (OUTPATIENT)
Dept: INTERNAL MEDICINE | Facility: CLINIC | Age: 45
End: 2025-06-12
Payer: COMMERCIAL

## 2025-06-12 VITALS
BODY MASS INDEX: 39.92 KG/M2 | WEIGHT: 239.6 LBS | OXYGEN SATURATION: 97 % | DIASTOLIC BLOOD PRESSURE: 83 MMHG | HEART RATE: 70 BPM | TEMPERATURE: 96.8 F | HEIGHT: 65 IN | SYSTOLIC BLOOD PRESSURE: 118 MMHG | RESPIRATION RATE: 16 BRPM

## 2025-06-12 DIAGNOSIS — Z11.4 SCREENING FOR HIV (HUMAN IMMUNODEFICIENCY VIRUS): Primary | ICD-10-CM

## 2025-06-12 RX ORDER — TRIAMCINOLONE ACETONIDE 1 MG/G
OINTMENT TOPICAL 2 TIMES DAILY
COMMUNITY
Start: 2025-06-04

## 2025-06-12 ASSESSMENT — ASTHMA QUESTIONNAIRES
ACT_TOTALSCORE: 25
QUESTION_4 LAST FOUR WEEKS HOW OFTEN HAVE YOU USED YOUR RESCUE INHALER OR NEBULIZER MEDICATION (SUCH AS ALBUTEROL): NOT AT ALL
QUESTION_1 LAST FOUR WEEKS HOW MUCH OF THE TIME DID YOUR ASTHMA KEEP YOU FROM GETTING AS MUCH DONE AT WORK, SCHOOL OR AT HOME: NONE OF THE TIME
QUESTION_2 LAST FOUR WEEKS HOW OFTEN HAVE YOU HAD SHORTNESS OF BREATH: NOT AT ALL
QUESTION_3 LAST FOUR WEEKS HOW OFTEN DID YOUR ASTHMA SYMPTOMS (WHEEZING, COUGHING, SHORTNESS OF BREATH, CHEST TIGHTNESS OR PAIN) WAKE YOU UP AT NIGHT OR EARLIER THAN USUAL IN THE MORNING: NOT AT ALL
QUESTION_5 LAST FOUR WEEKS HOW WOULD YOU RATE YOUR ASTHMA CONTROL: COMPLETELY CONTROLLED

## 2025-06-12 NOTE — PROGRESS NOTES
"  Assessment & Plan     Steffi is a 44yoF with PMH of seasonal allergies who presents for evaluation of skin rash.     # Urticarial rash, diffuse, improving  Has urticarial appearing rash on bilateral arms and knees that started about 10 days ago. Improved after getting steroid cream and taking antihistamines (was evaluated at walk-in clinic). No clear new exposures. Discussed that this is likely a manifestation of existing allergies possibly exacerbated by recent poor air quality. No fevers/chills or recent viral infections.   - Continue topical steroids and cetirizine    BMI  Estimated body mass index is 39.87 kg/m  as calculated from the following:    Height as of this encounter: 1.651 m (5' 5\").    Weight as of this encounter: 108.7 kg (239 lb 9.6 oz).   BMI not discussed at this visit.     Patient seen and discussed with Dr. Pandey.     Subjective   Steffi is a 44 year old, presenting for the following health issues:  Derm Problem (Rash on arm and knees)      6/12/2025     9:11 AM   Additional Questions   Roomed by KTR     History of Present Illness       Reason for visit:  Persistent rash on arms and knees    She eats 0-1 servings of fruits and vegetables daily.She consumes 0 sweetened beverage(s) daily.She exercises with enough effort to increase her heart rate 20 to 29 minutes per day.  She exercises with enough effort to increase her heart rate 4 days per week.   She is taking medications regularly.    Here to discuss rash that started a bit more than a week ago. First noticed on right arm, then saw on left arm. More recently on front and back of both knees. Very itchy. Mostly red spots appearance-wise. No crusted or open lesions. No recent fevers or chills. No known recent viral infections. Feels well overall. Has known history of seasonal allergies but usually just gets watery eyes, has not had rash like this. Has been working out in her garden more.               Objective    /83 (BP Location: " "Right arm, Patient Position: Sitting, Cuff Size: Adult Large)   Pulse 70   Temp 96.8  F (36  C) (Temporal)   Resp 16   Ht 1.651 m (5' 5\")   Wt 108.7 kg (239 lb 9.6 oz)   SpO2 97%   BMI 39.87 kg/m    Body mass index is 39.87 kg/m .  Physical Exam   Gen: No acute distress  HEENT: Normocephalic, atraumatic  CV: Regular rate  Pulm: Non-labored breathing  Abd: Non-distended  Skin: Scattered maculopapular rash on bilateral upper arms (lateral aspects) and anterior & posterior surfaces of bilateral knees, no warmth or wounds  Neuro: Alert and conversant            Signed Electronically by: Hugo Cordoba MD    "

## 2025-06-12 NOTE — PATIENT INSTRUCTIONS
Summary of today's plan:  - Continue topical steroids and cetirizine (Zyrtec)  - Avoid outdoor air exposure as able for the time being

## 2025-07-03 DIAGNOSIS — E78.5 HYPERLIPIDEMIA LDL GOAL <100: ICD-10-CM

## 2025-07-03 DIAGNOSIS — K76.0 NAFLD (NONALCOHOLIC FATTY LIVER DISEASE): ICD-10-CM

## 2025-07-03 DIAGNOSIS — F33.1 MAJOR DEPRESSIVE DISORDER, RECURRENT EPISODE, MODERATE (H): Primary | ICD-10-CM

## 2025-07-05 DIAGNOSIS — E66.813 CLASS 3 SEVERE OBESITY DUE TO EXCESS CALORIES WITH BODY MASS INDEX (BMI) OF 45.0 TO 49.9 IN ADULT, UNSPECIFIED WHETHER SERIOUS COMORBIDITY PRESENT (H): Primary | ICD-10-CM

## 2025-07-08 RX ORDER — ATORVASTATIN CALCIUM 10 MG/1
10 TABLET, FILM COATED ORAL DAILY
Qty: 90 TABLET | Refills: 3 | Status: SHIPPED | OUTPATIENT
Start: 2025-07-08

## 2025-07-08 RX ORDER — SERTRALINE HYDROCHLORIDE 100 MG/1
100 TABLET, FILM COATED ORAL DAILY
Qty: 90 TABLET | Refills: 1 | Status: SHIPPED | OUTPATIENT
Start: 2025-07-08

## 2025-07-09 RX ORDER — SEMAGLUTIDE 2.4 MG/.75ML
2.4 INJECTION, SOLUTION SUBCUTANEOUS
Qty: 6 ML | Refills: 0 | Status: SHIPPED | OUTPATIENT
Start: 2025-07-09

## 2025-07-26 DIAGNOSIS — J30.89 CHRONIC NON-SEASONAL ALLERGIC RHINITIS: ICD-10-CM

## 2025-07-26 DIAGNOSIS — F33.1 MAJOR DEPRESSIVE DISORDER, RECURRENT EPISODE, MODERATE (H): ICD-10-CM

## 2025-07-29 RX ORDER — MONTELUKAST SODIUM 10 MG/1
1 TABLET ORAL AT BEDTIME
Qty: 90 TABLET | Refills: 0 | Status: SHIPPED | OUTPATIENT
Start: 2025-07-29

## 2025-07-29 RX ORDER — BUPROPION HYDROCHLORIDE 150 MG/1
150 TABLET ORAL EVERY MORNING
Qty: 90 TABLET | Refills: 0 | Status: SHIPPED | OUTPATIENT
Start: 2025-07-29

## 2025-07-29 NOTE — TELEPHONE ENCOUNTER
Last Written Prescription:  montelukast (SINGULAIR) 10 MG tablet 90 tablet 0 4/28/2025 -- No   Sig - Route: Take 1 tablet (10 mg) by mouth at bedtime. - Oral     buPROPion (WELLBUTRIN XL) 150 MG 24 hr tablet 90 tablet 0 4/28/2025 -- No   Sig - Route: TAKE 1 TABLET BY MOUTH EVERY MORNING - Oral     ----------------------  Last Visit Date:   6/12/2025  [facility] [place]    Future Visit Date: 0  ----------------------      Refill decision:   [x] Medication refilled per  Medication Refill in Ambulatory Care  policy.      Request from pharmacy:  Requested Prescriptions   Pending Prescriptions Disp Refills    montelukast (SINGULAIR) 10 MG tablet [Pharmacy Med Name: MONTELUKAST SOD 10 MG TABLET] 90 tablet 0     Sig: TAKE 1 TABLET BY MOUTH EVERYDAY AT BEDTIME       Leukotriene Inhibitors Protocol Passed - 7/29/2025 11:11 AM        Passed - Patient is age 12 or older     If patient is under 16, ok to refill using age based dosing.           Passed - Medication is active on med list and the sig matches. RN to manually verify dose and sig if red X/fail.     If the protocol passes (green check), you do not need to verify med dose and sig.    A prescription matches if they are the same clinical intention.    For Example: once daily and every morning are the same.    The protocol can not identify upper and lower case letters as matching and will fail.     For Example: Take 1 tablet (50 mg) by mouth daily     TAKE 1 TABLET (50 MG) BY MOUTH DAILY    For all fails (red x), verify dose and sig.    If the refill does match what is on file, the RN can still proceed to approve the refill request.       If they do not match, route to the appropriate provider.             Passed - Recent (12 month) or future (90 days) visit with authorizing provider's specialty (provided they have been seen in the past 15 months)     The patient must have completed an in-person or virtual visit within the past 12  months or has a future visit scheduled within the next 90 days with the authorizing provider s specialty.  Urgent care and e-visits do not qualify as an office visit for this protocol.          Passed - Medication indicated for associated diagnosis     Medication is associated with one or more of the following diagnoses:   Allergies   Asthma   Atopic Dermatitis   Nasal Congestion   Nasal discharge   Rhinitis   Urticaria, chronic   Cystic Fibrosis  Bronchiectasis              buPROPion (WELLBUTRIN XL) 150 MG 24 hr tablet [Pharmacy Med Name: BUPROPION HCL  MG TABLET] 90 tablet 0     Sig: TAKE 1 TABLET BY MOUTH EVERY MORNING       Rx Protocol Bupropion Passed - 7/29/2025 11:11 AM        Passed - PHQ-9 score of less than 5 in past 6 months     Please review last PHQ-9 score.       1/4/2024    11:02 AM 7/1/2024     9:20 AM 4/28/2025     3:10 PM   PHQ-9 SCORE   PHQ-9 Total Score MyChart  2 (Minimal depression) 4 (Minimal depression)   PHQ-9 Total Score 3 2 4        Patient-reported             Passed - Medication is active on med list and the sig matches. RN to manually verify dose and sig if red X/fail.     If the protocol passes (green check), you do not need to verify med dose and sig.    A prescription matches if they are the same clinical intention.    For Example: once daily and every morning are the same.    The protocol can not identify upper and lower case letters as matching and will fail.     For Example: Take 1 tablet (50 mg) by mouth daily     TAKE 1 TABLET (50 MG) BY MOUTH DAILY    For all fails (red x), verify dose and sig.    If the refill does match what is on file, the RN can still proceed to approve the refill request.       If they do not match, route to the appropriate provider.             Passed - Recent (12 month) or future (90 days) visit with authorizing provider's specialty (provided they have been seen in the past 15 months)     The patient must have completed an in-person or virtual  visit within the past 12 months or has a future visit scheduled within the next 90 days with the authorizing provider s specialty.  Urgent care and e-visits do not qualify as an office visit for this protocol.          Passed - Medication is indicated for associated diagnosis     Medication is associated with one or more of the following diagnoses:  Anxiety  Bipolar Disorder  Depression  Smoking Cessation  Adjustment disorder with mixed anxiety and depressed mood  Adjustment disorder with anxiety  Tobacco user  Adjustment disorder with anxious mood  Attention deficit hyperactivity disorder          Passed - Blood pressure on file in the past 12 months        Passed - Patient is age 18 or older        Passed - No active pregnancy on record        Passed - No positive pregnancy test in past 12 months

## (undated) DEVICE — BLADE KNIFE SURG 15 371115

## (undated) DEVICE — DRSG STERI STRIP 1/2X4" R1547

## (undated) DEVICE — GLOVE PROTEXIS POWDER FREE SMT 7.5  2D72PT75X

## (undated) DEVICE — SUCTION MANIFOLD NEPTUNE 2 SYS 4 PORT 0702-020-000

## (undated) DEVICE — SYR 03ML LL W/O NDL 309657

## (undated) DEVICE — PACK ENT ENDOSCOPY CUSTOM ASC

## (undated) DEVICE — LINEN TOWEL PACK X5 5464

## (undated) DEVICE — ESU GROUND PAD ADULT W/CORD E7507

## (undated) DEVICE — SU PLAIN 4-0 SC-1 18" 1824H

## (undated) DEVICE — SU CHROMIC 4-0 J-1 18" 724G

## (undated) DEVICE — SOL NACL 0.9% IRRIG 500ML BOTTLE 2F7123

## (undated) DEVICE — SOL BENZOIN 0.5OZ

## (undated) DEVICE — NDL 25GA 2"  8881200441

## (undated) DEVICE — SPONGE COTTONOID 2X1/2" 80-1406

## (undated) RX ORDER — LIDOCAINE HYDROCHLORIDE AND EPINEPHRINE 10; 10 MG/ML; UG/ML
INJECTION, SOLUTION INFILTRATION; PERINEURAL
Status: DISPENSED
Start: 2023-01-02

## (undated) RX ORDER — OXYCODONE HYDROCHLORIDE 5 MG/1
TABLET ORAL
Status: DISPENSED
Start: 2023-01-02

## (undated) RX ORDER — DEXAMETHASONE SODIUM PHOSPHATE 10 MG/ML
INJECTION, SOLUTION INTRAMUSCULAR; INTRAVENOUS
Status: DISPENSED
Start: 2023-01-02

## (undated) RX ORDER — PROPOFOL 10 MG/ML
INJECTION, EMULSION INTRAVENOUS
Status: DISPENSED
Start: 2023-01-02

## (undated) RX ORDER — OXYMETAZOLINE HYDROCHLORIDE 0.05 G/100ML
SPRAY NASAL
Status: DISPENSED
Start: 2023-01-02

## (undated) RX ORDER — FENTANYL CITRATE 50 UG/ML
INJECTION, SOLUTION INTRAMUSCULAR; INTRAVENOUS
Status: DISPENSED
Start: 2023-01-02

## (undated) RX ORDER — DEXAMETHASONE SODIUM PHOSPHATE 4 MG/ML
INJECTION, SOLUTION INTRA-ARTICULAR; INTRALESIONAL; INTRAMUSCULAR; INTRAVENOUS; SOFT TISSUE
Status: DISPENSED
Start: 2023-01-02

## (undated) RX ORDER — ACETAMINOPHEN 325 MG/1
TABLET ORAL
Status: DISPENSED
Start: 2023-01-02

## (undated) RX ORDER — ESMOLOL HYDROCHLORIDE 10 MG/ML
INJECTION INTRAVENOUS
Status: DISPENSED
Start: 2023-01-02

## (undated) RX ORDER — GABAPENTIN 300 MG/1
CAPSULE ORAL
Status: DISPENSED
Start: 2023-01-02